# Patient Record
Sex: MALE | Race: WHITE | NOT HISPANIC OR LATINO | Employment: FULL TIME | URBAN - NONMETROPOLITAN AREA
[De-identification: names, ages, dates, MRNs, and addresses within clinical notes are randomized per-mention and may not be internally consistent; named-entity substitution may affect disease eponyms.]

---

## 2017-06-10 ENCOUNTER — HISTORY (OUTPATIENT)
Dept: EMERGENCY MEDICINE | Facility: OTHER | Age: 22
End: 2017-06-10

## 2017-06-24 ENCOUNTER — HISTORY (OUTPATIENT)
Dept: EMERGENCY MEDICINE | Facility: OTHER | Age: 22
End: 2017-06-24

## 2017-10-07 ENCOUNTER — OFFICE VISIT - GICH (OUTPATIENT)
Dept: FAMILY MEDICINE | Facility: OTHER | Age: 22
End: 2017-10-07

## 2017-10-07 ENCOUNTER — HISTORY (OUTPATIENT)
Dept: FAMILY MEDICINE | Facility: OTHER | Age: 22
End: 2017-10-07

## 2017-10-07 DIAGNOSIS — R31.9 HEMATURIA: ICD-10-CM

## 2017-10-07 DIAGNOSIS — R10.9 ABDOMINAL PAIN: ICD-10-CM

## 2017-10-07 LAB
BACTERIA URINE: ABNORMAL BACTERIA/HPF
BILIRUB UR QL: NEGATIVE
CLARITY, URINE: ABNORMAL CLARITY
COLOR UR: ABNORMAL COLOR
EPITHELIAL CELLS: ABNORMAL EPI/HPF
GLUCOSE URINE: NEGATIVE MG/DL
KETONES UR QL: NEGATIVE MG/DL
LEUKOCYTE ESTERASE URINE: NEGATIVE
NITRITE UR QL STRIP: NEGATIVE
OCCULT BLOOD,URINE - HISTORICAL: ABNORMAL
PH UR: 7.5 [PH]
PROTEIN QUALITATIVE,URINE - HISTORICAL: NEGATIVE MG/DL
RBC - HISTORICAL: ABNORMAL /HPF
SP GR UR STRIP: 1.02
UROBILINOGEN,QUALITATIVE - HISTORICAL: NORMAL EU/DL
WBC - HISTORICAL: ABNORMAL /HPF

## 2017-12-27 NOTE — PROGRESS NOTES
Patient Information     Patient Name MRN Sex Hi Davis [id number] Male 1995      Progress Notes by Gabriella Jacobo PA-C at 10/7/2017  4:45 PM     Author:  Gabriella Jacobo PA-C Service:  (none) Author Type:  PHYS- Physician Assistant     Filed:  10/7/2017  6:33 PM Encounter Date:  10/7/2017 Status:  Signed     :  Gabriella Jacobo PA-C (PHYS- Physician Assistant)            SUBJECTIVE:    Hi Kinney is a 22 y.o. male who presents for flank pain.    HPI Comments: Hi is here today for complaints of flank pain that started today.  It started on his right side, low back, then moved to the right side of the low abdomen and testicles ache intermittently.  The pain is colickly, throbs for a while, then resolved.  He has had many kidney stones in the past, at least 8-10, he states.  He has not seen a urologist but planned to in the future.  He has never sent a stone for composition.  He states this pain is not as bad as it has been in the past with prior kidney stones.    The pain is a 4-5 out of 10 now.   He took some ibuprofen and that helped. He has not had any noted hematuria or burning with urination. He is able to urinate normally.  He is not currently taking Flomax. This was prescribed to him in the past and he felt like it actually increased his pain so he would prefer not to take it.   He states he really has not been drinking much water in the last 2 weeks so he started drinking more when the pain came on today.    Patient Active Problem List    Diagnosis Code   (none) - all problems resolved or deleted    H/o recurrent renal lithiasis    Medications:  Ibuprofen    Allergies     Allergen  Reactions     Augmentin [Amoxicillin-Pot Clavulanate] *Unknown - Childhood Rxn     Cefzil [Cefprozil] *Unknown - Childhood Rxn       REVIEW OF SYSTEMS:  Review of Systems   Constitutional: Negative for chills, fever and malaise/fatigue.   Respiratory: Negative for cough and shortness of breath.   "  Cardiovascular: Negative for chest pain.   Gastrointestinal: Positive for diarrhea. Negative for constipation, nausea and vomiting.        One episode of loose stools this morning, resolved now.    Genitourinary: Positive for flank pain. Negative for dysuria, frequency, hematuria and urgency.   Musculoskeletal: Negative for myalgias.   Neurological: Negative for dizziness and headaches.     Social history:   Denies risk for STI (reviewed in detail).  Non smoker.     OBJECTIVE:  /84  Pulse 88  Temp 98.2  F (36.8  C) (Tympanic)  Ht 1.727 m (5' 8\")  Wt 107.2 kg (236 lb 6.4 oz)  BMI 35.94 kg/m2    EXAM:   Physical Exam   Constitutional: He is well-developed, well-nourished, and in no distress.   HENT:   Mouth/Throat: Oropharynx is clear and moist.   Neck: Neck supple.   Cardiovascular: Normal rate, regular rhythm and normal heart sounds.    Pulmonary/Chest: Effort normal and breath sounds normal. No respiratory distress.   Abdominal: Soft. Bowel sounds are normal. He exhibits no distension and no mass. There is no tenderness. There is no rebound and no guarding.   No CVA tenderness.   Genitourinary: Penis normal. No discharge found.   Genitourinary Comments: Testicles are descended and are smooth and nontender.    Lymphadenopathy:     He has no cervical adenopathy.   Skin: Skin is warm and dry.     Results for orders placed or performed in visit on 10/07/17      URINALYSIS W REFLEX MICROSCOPIC IF POSITIVE      Result  Value Ref Range    COLOR                     Becca (A) Yellow Color    CLARITY                   Slightly Cloudy (A) Clear Clarity    SPECIFIC GRAVITY,URINE    1.020 1.010, 1.015, 1.020, 1.025                    PH,URINE                  7.5 6.0, 7.0, 8.0, 5.5, 6.5, 7.5, 8.5                    UROBILINOGEN,QUALITATIVE  Normal Normal EU/dl    PROTEIN, URINE Negative Negative mg/dL    GLUCOSE, URINE Negative Negative mg/dL    KETONES,URINE             Negative Negative mg/dL    BILIRUBIN,URINE   "         Negative Negative                    OCCULT BLOOD,URINE        Large (A) Negative                    NITRITE                   Negative Negative                    LEUKOCYTE ESTERASE        Negative Negative                   URINALYSIS MICROSCOPIC      Result  Value Ref Range    RBC  (A) 0-2, None Seen /HPF    WBC None Seen 0-2, 3-5, None Seen /HPF    BACTERIA                  Few None Seen, Rare, Occasional, Few Bacteria/HPF    EPITHELIAL CELLS          None Seen None Seen, Few Epi/HPF     - Patient reports that he had a small stone pass into the urine cup with collection of this sample. He states it was about the size of a pin head and he felt immediate relief from his pain with this.   The lab was called to see if they could retrieve the stone but it was not found (possibly poured off with initial aliquot).        ASSESSMENT/PLAN:    ICD-10-CM    1. Flank pain R10.9 URINALYSIS W REFLEX MICROSCOPIC IF POSITIVE      URINALYSIS W REFLEX MICROSCOPIC IF POSITIVE      URINALYSIS MICROSCOPIC      URINALYSIS MICROSCOPIC   2. Hematuria, unspecified type R31.9         Plan: Flank pain and hematuria with probable kidney stone.  Patient was offered a referral to urology and he declines, citing a busy work schedule. If he has return of symptoms, he will make appointment with Dr. Peralta.  If he has severe symptoms, he will return for urgent re-evaluation.   He will be sure to increase oral fluids.    Gabriella Jacobo PA-C ....................  10/7/2017   6:29 PM

## 2017-12-29 NOTE — PATIENT INSTRUCTIONS
Patient Information     Patient Name MRN Hi Tovar [id number] Male 1995      Patient Instructions by Gabriella Jacobo PA-C at 10/7/2017  4:45 PM     Author:  Gabriella Jacobo PA-C Service:  (none) Author Type:  PHYS- Physician Assistant     Filed:  10/7/2017  6:13 PM Encounter Date:  10/7/2017 Status:  Signed     :  Gabriella Jacobo PA-C (PHYS- Physician Assistant)               Index Serbian Related topics   Kidney Stone   ________________________________________________________________________  KEY POINTS    A kidney stone is a solid piece of material that forms in your urinary system from substances in the urine. Stones may be small or large, and you may have 1 or many stones.    You may pass a kidney stone in your urine, or your healthcare provider will use shock waves to break the stones into pieces so they can pass, or you may have surgery to remove the stones.    Follow your healthcare provider's recommended treatment for any health problems that may be causing kidney stones.  ________________________________________________________________________  What is a kidney stone?  A kidney stone is a solid piece of material that forms in your urinary system. The stones are formed from substances in the urine. You can get a stone in any part of the urinary tract, including the kidneys, ureters, bladder, and urethra. Stones may be small or large. You may have just 1 stone or many stones.  The kidneys are inside your belly, on either side of your spine just above your waist. They make urine by taking waste products and extra salt and water from the blood. The ureters are tubes that carry urine from the kidneys to the bladder. The bladder holds your urine until you urinate, and the urethra is the tube that drains urine from the bladder.  Kidney stones are most common in young and middle-aged people. They are more common in men than in women. You may get them more than once.  What is the  cause?  There are different types of kidney stones.    Calcium stones: Most kidney stones are calcium stones. Many people have calcium stones from too much of a chemical called oxalate in their diet. Oxalate is in many foods, such as spinach, rhubarb, leafy vegetables, coffee, chocolate, and tomatoes. You may also get calcium stones when there is too much calcium in your urine. This may happen when your kidneys don't work properly or your stomach and intestines absorb too much calcium. The risk of having calcium stones is higher if you have certain medical conditions, such as an overactive parathyroid gland (a gland in the neck that controls calcium levels in the body) or inflammatory bowel disease, like Crohn s disease or ulcerative colitis.    Uric acid stones: Uric acid stones happen when you have too much uric acid in your urine. This might happen when your body does not have enough fluid--for example, when you are exercising on a hot day or during an illness and you don t drink enough fluids. Uric acid stones are common in people who have gout, a type of arthritis caused by too much uric acid in the blood.    Struvite stones: This type of stone is also called an infection stone because it forms in urine that is infected with bacteria.    Cystine stones. This type of kidney stone is rare. It happens if you have a genetic disease called cystinuria. This disease results from a birth defect that causes the kidney to let too much cystine into the urine. This type of stone is almost always diagnosed during childhood.  What are the symptoms?  Some kidney stones don t cause any symptoms. When they do, the symptoms may include:    Severe, crampy pain in your back or belly (the most common symptom)    Nausea and vomiting    Pink, red, or brown urine  Usually kidney stones cause pain off and on until the stone passes out of the body. The pain may move from the upper to the lower belly over a few hours. As the stone moves  lower, the pain may be felt in the genital area.  Sometimes kidney stones cause a blockage and a urinary tract infection. If you have an infection, you may have fever, chills, sweats, and pain when you urinate.   Some people don t have any symptoms until they pass the gravel-like stones in their urine. Others never have any symptoms, and their stones are found during testing for other problems.   How is it diagnosed?  Your healthcare provider will ask about your symptoms and medical history and examine you. You will have urine and blood tests. Other tests you may have are:    X-ray of your belly    Ultrasound scan, which passes sound waves and their echoes through your body from a small device that is held against your skin to create pictures of organs inside your belly or pelvis    CT scan, which is a series of X-rays taken from different angles and arranged by a computer to show thin cross sections of parts of the body  How is it treated?  Treatment depends on:    The size, type, and location of the stone(s)    If stones are blocking urine flow out of the kidney    If you have signs of infection  You may be able to treat yourself at home by drinking lots of liquids and taking pain medicine. Kidney stones that are 3/16 of an inch (0.5 centimeters) or less in diameter usually pass on their own. Your healthcare provider may ask you to strain all urine until the stone is passed. When the stone is caught, it can be tested in the lab to see what kind of stone it is. Your provider may prescribe medicine that will help you pass stones or help keep you from getting more stones.  A stone may take days or even weeks to pass. If you have pain from a stone and it has not passed after a month or so, your healthcare provider may need to remove it.  You may need to be in the hospital if:    Oral medicines are not relieving pain from the stones    You are vomiting too much to drink liquids    You have signs of infection in your  bloodstream, such as fever, chills, and a high white blood cell count    You need surgery to remove a stone that is not passing and is causing severe problems  Shock waves through the skin may be used to break up the stones into smaller pieces. The pieces of stone may then be able to pass out of your body when you urinate. This is a procedure called extra-corporeal (outside of the body) shock wave lithotripsy (ESWL).  If you have a stone in the bladder that needs to be removed with surgery, it may be broken up first with lithotripsy.  Two types of surgery may be done to treat kidney stones.    Ureteroscopy uses a thin, lighted flexible tube inserted into the urinary tract through the urethra. Tiny tools and laser fiber can be passed through the scope to break up and remove stones.    The approach through the skin requires a small cut in your back. A lighted tube, laser fiber, and tools used to break and remove the stones are inserted through the cut in your skin.  Sometimes a small tube called a stent may be placed in one of the ureters to help a stone pass. A stent may be used if:    A stone is large    There is a risk of blockage by stones    You have an infection caused by blockage  How can I take care of myself?  Follow the full course of treatment prescribed by your healthcare provider. In addition:    Make sure you drink enough liquids.    Take pain medicine as prescribed by your healthcare provider.  Ask your provider:    How and when you will get your test results    How long it will take to recover    If there are activities you should avoid and when you can return to your normal activities    How to take care of yourself at home    What symptoms or problems you should watch for and what to do if you have them  Make sure you know when you should come back for a checkup. Keep all appointments for provider visits or tests.  How can I help prevent kidney stones?    Follow your healthcare provider's recommended  treatment for any health problems that may be causing kidney stones.    Drink plenty of fluids every day.    Follow any changes in your diet recommended by your provider.    Take any medicines your provider may prescribe to help prevent more stones.  Developed by [employer].  Adult Advisor 2016.3 published by [employer].  Last modified: 2016-03-23  Last reviewed: 2015-08-10  This content is reviewed periodically and is subject to change as new health information becomes available. The information is intended to inform and educate and is not a replacement for medical evaluation, advice, diagnosis or treatment by a healthcare professional.  References   Adult Advisor 2016.3 Index    Copyright   2016 [employer], a division of [employer] All rights reserved.

## 2017-12-30 NOTE — NURSING NOTE
Patient Information     Patient Name MRN Hi Tovar [id number] Male 1995      Nursing Note by Franklin García at 10/7/2017  4:45 PM     Author:  Franklin García Service:  (none) Author Type:  (none)     Filed:  10/7/2017  5:30 PM Encounter Date:  10/7/2017 Status:  Signed     :  Franklin García            Patient presents with concerns of a bladder infection. He states that he first started having symptoms today around noon. Started with a pain in his right side and back and now the pain has moved to his bladder. Patient states that he has had kidney stones in the past, but denies any bladder infections. Patient states that he took 3 Advil around 4 pm today.     Franklin García ....................  10/7/2017   5:13 PM

## 2018-01-14 ENCOUNTER — OFFICE VISIT - GICH (OUTPATIENT)
Dept: FAMILY MEDICINE | Facility: OTHER | Age: 23
End: 2018-01-14

## 2018-01-14 ENCOUNTER — HISTORY (OUTPATIENT)
Dept: FAMILY MEDICINE | Facility: OTHER | Age: 23
End: 2018-01-14

## 2018-01-14 DIAGNOSIS — J01.80 OTHER ACUTE SINUSITIS: ICD-10-CM

## 2018-01-14 DIAGNOSIS — H65.01 ACUTE SEROUS OTITIS MEDIA OF RIGHT EAR: ICD-10-CM

## 2018-01-14 DIAGNOSIS — J40 BRONCHITIS: ICD-10-CM

## 2018-01-26 VITALS
HEART RATE: 88 BPM | DIASTOLIC BLOOD PRESSURE: 84 MMHG | WEIGHT: 236.4 LBS | SYSTOLIC BLOOD PRESSURE: 124 MMHG | BODY MASS INDEX: 35.83 KG/M2 | HEIGHT: 68 IN | TEMPERATURE: 98.2 F

## 2018-01-31 ENCOUNTER — DOCUMENTATION ONLY (OUTPATIENT)
Dept: FAMILY MEDICINE | Facility: OTHER | Age: 23
End: 2018-01-31

## 2018-02-09 VITALS
WEIGHT: 230.4 LBS | DIASTOLIC BLOOD PRESSURE: 84 MMHG | TEMPERATURE: 97.3 F | SYSTOLIC BLOOD PRESSURE: 150 MMHG | BODY MASS INDEX: 35.03 KG/M2

## 2018-02-12 NOTE — NURSING NOTE
Patient Information     Patient Name MRN Hi Tovar [id number] Male 1995      Nursing Note by Rosalinda Read at 2018  3:30 PM     Author:  Rosalinda Read Service:  (none) Author Type:  (none)     Filed:  2018  4:01 PM Encounter Date:  2018 Status:  Signed     :  Rosalinda Read            Patient states that he has had a cold for the last two weeks.  Ears have been hurting the last couple of days  Has taken Nyquil and Tylenol Cold and Flu  Rosalinda Read LPN 2018 3:42 PM

## 2018-02-12 NOTE — PROGRESS NOTES
Patient Information     Patient Name MRN Sex Hi Davis [id number] Male 1995      Progress Notes by Virgie Schwartz MD at 2018  3:30 PM     Author:  Virgie Schwartz MD Service:  (none) Author Type:  Physician     Filed:  2018  4:16 PM Encounter Date:  2018 Status:  Signed     :  Virgie Schwartz MD (Physician)            SUBJECTIVE:  Hi Kinney is a 22 y.o. male who complains of ear pain since this morning.  Has had congestion and URI for almost 3 weeks. Now can hardly taste or smell foods.   Had lots of otitis media in childhood and had tonsils out but none recently.  Cough worse at night. Has used some cold and flu over the counter medications and Nyquil as needed.            No current outpatient prescriptions on file.     No current facility-administered medications for this visit.      Medications have been reviewed by me and are current to the best of my knowledge and ability.    Allergies as of 2018 - Michael as Reviewed 2018      Allergen  Reaction Noted     Augmentin [amoxicillin-pot clavulanate] *Unknown - Childhood Rxn 2012     Cefzil [cefprozil] *Unknown - Childhood Rxn 2012       OBJECTIVE:  /84 (Cuff Site: Left Arm, Position: Sitting, Cuff Size: Adult Large)  Temp 97.3  F (36.3  C) (Tympanic)   Wt 104.5 kg (230 lb 6.4 oz)  BMI 35.03 kg/m2  General appearance: healthy, alert and cooperative.   Left Ear: normal; external ear canal and TM clear, nontender.  Right Ear: abnormal TM exam - serous fluid noted  Nose: clear rhinorrhea and mucosal edema and congestion  Oropharynx: normal pharynx and buccal mucosa. Dental hygeine adequate.  Neck: normal; supple with no masses or nodes.  Lungs:normal chest wall and respirations; clear to auscultation.      ASSESSMENT/PLAN:  1. Acute non-recurrent sinusitis of other sinus    2. Bronchitis    3. Right acute serous otitis media, recurrence not specified          Medications as per orders  and prescription for zithromax given due to allergy profile.   Symptomatic therapy suggested: use increased fluids and rest, acetaminophen, cough suppressant of choice and saline nasal spray OTC liberally prn.   Call or return to clinic prn if these symptoms worsen or fail to improve as anticipated.  Virgie Schwartz MD  4:16 PM 1/14/2018

## 2018-02-13 NOTE — PATIENT INSTRUCTIONS
Patient Information     Patient Name MRN Hi Tovar [id number] Male 1995      Patient Instructions by Virgie Schwartz MD at 2018  4:06 PM     Author:  Virgie Schwartz MD  Service:  (none) Author Type:  Physician     Filed:  2018  4:06 PM  Encounter Date:  2018 Status:  Addendum     :  Virgie Schwartz MD (Physician)        Related Notes: Original Note by Virgie Schwartz MD (Physician) filed at 2018  4:06 PM               Index Vietnamese Related topics   Sinusitis   ________________________________________________________________________  KEY POINTS    Sinusitis is swelling and irritation of the linings of the sinuses, the hollow spaces in the bones of your face and front of your skull.    You may need to take medicine for your stuffy nose, pain, infection, or swelling.    Use saline nasal sprays or rinses to help wash out nasal passages if you have a sinus infection. A humidifier can add moisture to the air also.  ________________________________________________________________________  What is sinusitis?  Sinusitis is swelling and irritation of the linings of the sinuses. The sinuses are hollow spaces in the bones of your face and front of your skull. They connect with the nose through small openings. Like the nose, they are lined with tissue (membranes) that make mucus. Mucus drains through the small openings to the nose.  What is the cause?  The passageways from the sinuses to the nose are very narrow. When drainage of mucus from the sinuses is blocked, the sinuses get swollen and irritated. They may also become infected with bacteria, a virus, or even fungus. Allergies or irritation from pollen, mold, dust, or smoke can also cause swelling of the sinuses. Sometimes a tooth infection spreads to the sinuses.  You may be more likely to get sinus congestion and infections if you have:    Severe or untreated seasonal or year-round allergies    Injured the bones  in your nose    A deformity of the nose that causes the sinuses not to drain properly    Small growths called polyps in the sinuses that partially block the sinus openings  What are the symptoms?  Symptoms may include:    Feeling of fullness or pressure in your face or head    A headache that is most painful when you first wake up in the morning or when you bend over and put your head down    Pain in your face    Aching in the upper jaw and teeth    Runny or stuffy nose    Cough, especially at night    Fluid draining down the back of your throat (postnasal drip)    Sore throat, especially in the morning or evening  How is it diagnosed?  Your healthcare provider will ask about your symptoms and medical history and examine you. Tests are often not needed but may include:    X-ray of your sinuses    CT scan, which uses X-rays and a computer to show detailed pictures of the sinuses  How is it treated?  Several kinds of medicine may help:    Nonprescription pain medicine, such as acetaminophen, ibuprofen, or naproxen. Read the label and take as directed. Unless recommended by your healthcare provider, you should not take these medicines for more than 10 days.    Nonsteroidal anti-inflammatory medicines (NSAIDs), such as ibuprofen, naproxen, and aspirin, may cause stomach bleeding and other problems. These risks increase with age.    Acetaminophen may cause liver damage or other problems. Unless recommended by your provider, don't take more than 3000 milligrams (mg) in 24 hours. To make sure you don t take too much, check other medicines you take to see if they also contain acetaminophen. Ask your provider if you need to avoid drinking alcohol while taking this medicine.    Decongestants pills or nasal sprays to reduce swelling in your nose and sinuses and lessen the amount of mucus. Use decongestants as directed. If you are using a nonprescription nasal-spray decongestant, generally you should not use it for more than 3  days. After 3 days it may make your symptoms worse. Ask your healthcare provider if it is OK for you to use a nasal spray decongestant longer than this.    Antihistamine tablets or a nasal spray to treat the allergies during your allergy season or, in some cases, year-round. Antihistamines block the effect of a chemical your body makes when you have an allergic reaction.    Antibiotics, if your provider thinks you might have a sinus infection    Steroid nasal spray if your provider thinks it may help clear your sinuses  If sinusitis is still a problem despite treatment, you may be referred to an allergy specialist or an ear, nose, and throat (ENT) specialist. The allergy specialist will check for and help treat your allergies to lessen the chance of having sinusitis. The ENT specialist will check for polyps or a deformed bone that may be blocking your sinuses. You may need surgery to create an extra or enlarged passageway to help your sinuses drain more easily.  Depending on what caused the sinusitis and how severe it is, it may last for days or weeks. For most cases of sinusitis, the symptoms get better gradually over 3 to 10 days.  How can I take care of myself?  Follow the full course of treatment prescribed by your healthcare provider. In addition:    If you are taking an antibiotic, take all of it as directed by your provider. If you stop taking the medicine when your symptoms are gone but before you have taken all of the medicine, symptoms may come back.    Don t smoke, and stay away from others who are smoking.    If you have allergies, try to avoid the things you are allergic to, like animal dander. Use medicine to keep your nose and sinuses open.    Use a humidifier to put more moisture in the air. This can help to open blocked sinuses and relieve pain. Avoid steam vaporizers because they can cause burns. Be sure to keep the humidifier clean, as recommended in the 's instructions. It's important  to keep bacteria and mold from growing in the water container.    Use saline nasal sprays or rinses to help wash out nasal passages if you have a sinus infection. You may use the sprays also to prevent infections.    Get plenty of rest.    Drink more fluids to keep the mucus as thin as possible so your sinuses can drain more easily.    Raise the head of your bed slightly or sleep on extra pillows to help your sinuses drain.    Put warm, moist cloths on painful areas.  Ask your healthcare provider:    How and when you will get your test results    How long it will take to recover    If there are activities you should avoid and when you can return to your normal activities    How to take care of yourself at home    What symptoms or problems you should watch for and what to do if you have them  Make sure you know when you should come back for a checkup. Keep all appointments for provider visits or tests.  How can I help prevent sinusitis?    Treat your colds and allergies promptly. Use decongestants as soon as you start having symptoms, and before you fly, travel to high altitudes, or swim in deep water.    If you smoke, try to quit. Talk to your healthcare provider about ways to quit smoking.  Developed by [employer].  Adult Advisor 2017.2 published by [employer].  Last modified: 2016-03-23  Last reviewed: 2015-08-27  This content is reviewed periodically and is subject to change as new health information becomes available. The information is intended to inform and educate and is not a replacement for medical evaluation, advice, diagnosis or treatment by a healthcare professional.  References   Adult Advisor 2017.2 Index    Copyright   2017 [employer], a division of [employer] All rights reserved.

## 2018-06-06 ENCOUNTER — OFFICE VISIT (OUTPATIENT)
Dept: FAMILY MEDICINE | Facility: OTHER | Age: 23
End: 2018-06-06
Attending: PHYSICIAN ASSISTANT
Payer: COMMERCIAL

## 2018-06-06 VITALS
SYSTOLIC BLOOD PRESSURE: 130 MMHG | HEART RATE: 80 BPM | WEIGHT: 236.2 LBS | DIASTOLIC BLOOD PRESSURE: 86 MMHG | BODY MASS INDEX: 35.8 KG/M2 | TEMPERATURE: 97.7 F | HEIGHT: 68 IN | OXYGEN SATURATION: 99 %

## 2018-06-06 DIAGNOSIS — R10.9 FLANK PAIN: Primary | ICD-10-CM

## 2018-06-06 DIAGNOSIS — R31.29 MICROSCOPIC HEMATURIA: ICD-10-CM

## 2018-06-06 LAB
ALBUMIN UR-MCNC: NEGATIVE MG/DL
APPEARANCE UR: CLEAR
BILIRUB UR QL STRIP: NEGATIVE
C TRACH DNA SPEC QL PROBE+SIG AMP: NOT DETECTED
COLOR UR AUTO: YELLOW
GLUCOSE UR STRIP-MCNC: NEGATIVE MG/DL
HGB UR QL STRIP: ABNORMAL
KETONES UR STRIP-MCNC: NEGATIVE MG/DL
LEUKOCYTE ESTERASE UR QL STRIP: NEGATIVE
N GONORRHOEA DNA SPEC QL PROBE+SIG AMP: NOT DETECTED
NITRATE UR QL: NEGATIVE
PH UR STRIP: 5.5 PH (ref 5–7)
RBC #/AREA URNS AUTO: NORMAL /HPF
SOURCE: ABNORMAL
SP GR UR STRIP: <1.005 (ref 1–1.03)
SPECIMEN SOURCE: NORMAL
UROBILINOGEN UR STRIP-ACNC: 0.2 EU/DL (ref 0.2–1)
WBC #/AREA URNS AUTO: NORMAL /HPF

## 2018-06-06 PROCEDURE — 87591 N.GONORRHOEAE DNA AMP PROB: CPT | Performed by: PHYSICIAN ASSISTANT

## 2018-06-06 PROCEDURE — 81001 URINALYSIS AUTO W/SCOPE: CPT | Performed by: PHYSICIAN ASSISTANT

## 2018-06-06 PROCEDURE — 87491 CHLMYD TRACH DNA AMP PROBE: CPT | Performed by: PHYSICIAN ASSISTANT

## 2018-06-06 PROCEDURE — 99213 OFFICE O/P EST LOW 20 MIN: CPT | Performed by: PHYSICIAN ASSISTANT

## 2018-06-06 RX ORDER — IBUPROFEN 800 MG/1
800 TABLET, FILM COATED ORAL EVERY 8 HOURS PRN
Qty: 30 TABLET | Refills: 0 | Status: SHIPPED | OUTPATIENT
Start: 2018-06-06 | End: 2019-11-02

## 2018-06-06 NOTE — PROGRESS NOTES
"SUBJECTIVE: Hi Kinney is a 22 year old male who complains of flank pain on right. Onset at bedtime last night. Quality: pressure. Severity: 3/10. Waxes and wanes. Course is improved from last night. Aggravated by: nothing. Alleviated by: ibuprofen 400 mg at 8 AM today.   Associated symptoms: no burning with urination, frequency, hematuria, normal stool. Urine is clear. No fever, abdominal pain, nausea and vomiting    History of frequent stones. Last stone was less than 1 year ago. No prior UTI. He in the past has had every 2 months. He alleviated this by drinking more fluids, over the past month he hasn't been drinking fluids as well recently and thinks maybe he has another stone.     Is not sexually active. Does not suspect STI    History reviewed. No pertinent past medical history.  No current outpatient prescriptions on file.     No current facility-administered medications for this visit.         Allergies   Allergen Reactions     Amoxicillin-Pot Clavulanate      Other reaction(s): *Unknown - Childhood Rxn     Cefprozil      Other reaction(s): *Unknown - Childhood Rxn         OBJECTIVE:   Vitals:    06/06/18 1046   BP: 130/86   Pulse: 80   Temp: 97.7  F (36.5  C)   SpO2: 99%   Weight: 236 lb 3.2 oz (107.1 kg)   Height: 5' 8\" (1.727 m)     General: alert and oriented, NAD  Cardiac: normal RR, no murmur  Respiratory: normal respiration, clear to ausculation  Abdomen: soft, non tender, normal bowel sounds, no CVAT    Results for orders placed or performed in visit on 06/06/18 (from the past 24 hour(s))   *UA reflex to Microscopic   Result Value Ref Range    Color Urine Yellow     Appearance Urine Clear     Glucose Urine Negative NEG^Negative mg/dL    Bilirubin Urine Negative NEG^Negative    Ketones Urine Negative NEG^Negative mg/dL    Specific Gravity Urine <1.005 1.003 - 1.035    Blood Urine Trace (A) NEG^Negative    pH Urine 5.5 5.0 - 7.0 pH    Protein Albumin Urine Negative NEG^Negative mg/dL    " Urobilinogen Urine 0.2 0.2 - 1.0 EU/dL    Nitrite Urine Negative NEG^Negative    Leukocyte Esterase Urine Negative NEG^Negative    Source Unspecified Urine    Urine Microscopic   Result Value Ref Range    WBC Urine 0 - 5 OTO5^0 - 5 /HPF    RBC Urine O - 2 OTO2^O - 2 /HPF         ASSESSMENT:     (R10.9) Flank pain  (R31.29) Microscopic hematuria  (primary encounter diagnosis)    Plan: UA reflex to Microscopic, GC/Chlamydia by PCR         - HI,GH, Urine Microscopicibuprofen (ADVIL/MOTRIN) 800 MG tablet    Microscopic hematuria with right flank pain  Urinalysis negative for infection - did show microscopic blood  Suspect stone has passed or will pass with history  Drink 2-3 liters of fluid per day  Ibuprofen 800 mg every 6-8 hours, max 2400 mg/day. Take with food.   Tylenol 1000 gm every 4-6 hours, max 4000 mg/day  Strain urine  Follow up with PCP as needed  Declines referral to urology, Zofran and Flomax today  Patient received verbal and written instruction including review of warning signs    NOAH Phelan on 6/6/2018 at 3:18 PM

## 2018-06-06 NOTE — NURSING NOTE
Patient present to clinic today with a possible bladder infection. Blood in urine, right lower back pain. History of kidney stones.   Cathy Bustillo CMA..............6/6/2018........10:45 AM

## 2018-06-06 NOTE — PATIENT INSTRUCTIONS
"Microscopic hematuria with right flank pain  Urinalysis negative for infection - did show microscopic blood  Suspect stone has passed or will pass with history  Drink 2-3 liters of fluid per day  Ibuprofen 800 mg every 6-8 hours, max 2400 mg/day. Take with food.   Tylenol 1000 gm every 4-6 hours, max 4000 mg/day  Strain urine  Follow up with PCP as needed  Declines referral to urology, Zofran and Flomax today  Seek immediate care    Pain that is not controlled by the medicine given    Repeated vomiting or unable to keep down fluids    Weakness, dizziness or fainting    Fever over 101  F (38.3  C)    Passage of solid red or brown urine (can't see through it) or urine with lots of blood clots    Unable to pass urine for 8 hours and increasing bladder pressure     * KIDNEY STONE (w/ Colic)    The sharp cramping pain and nausea/vomiting that you have is due to a small stone which has formed in the kidney and is now passing down a narrow tube (ureter) on its way to your bladder. Once it reaches your bladder, the pain will stop. The stone may pass in your urine stream in one piece. [The size may be 1/16\" to 1/4\" (1-6mm)]. Or, the stone may also break up into amandeep fragments which you may not even notice.  Once you have had a kidney stone there is a risk for recurrence in the future.  HOME CARE:      Drink lots of fluid (at least 8-10 glasses of water a day).    Most stones will pass on their own, but may take from a few hours to a few days.    Each time you urinate, do so in a jar. Pour the urine from the jar through the strainer and into the toilet. Continue doing this until 24 hours after your pain stops. By then, if there was a kidney stone, it should pass from your bladder. Some stones dissolve into sand-like particles and pass right through the strainer. In that case, you won't ever see a stone.    Save any stone that you find in the strainer and bring it to your doctor for analysis. It may be possible to prevent " certain types of stones from forming. Therefore, it is important to know what kind of stone you have.    Try to stay as active as possible since this will help the stone pass. Do not stay in bed unless your pain prevents you from getting up. You may notice a red, pink or brown color to your urine. This is normal while passing a kidney stone.  FOLLOW UP with your doctor or return to this facility if the pain lasts more than 48 hours.  GET PROMPT MEDICAL ATTENTION if any of the following occur:    Pain that is not controlled by the medicine given    Repeated vomiting or unable to keep down fluids    Weakness, dizziness or fainting    Fever over 101  F (38.3  C)    Passage of solid red or brown urine (can't see through it) or urine with lots of blood clots    Unable to pass urine for 8 hours and increasing bladder pressure    4429-1580 [employer]. [address] [place] [address], [place] [address]. All rights reserved. This information is not intended as a substitute for professional medical care. Always follow your healthcare professional's instructions.  This information has been modified by your health care provider with permission from the publisher.

## 2018-06-28 ENCOUNTER — OFFICE VISIT (OUTPATIENT)
Dept: FAMILY MEDICINE | Facility: OTHER | Age: 23
End: 2018-06-28
Attending: NURSE PRACTITIONER
Payer: COMMERCIAL

## 2018-06-28 VITALS
WEIGHT: 234.13 LBS | DIASTOLIC BLOOD PRESSURE: 84 MMHG | HEART RATE: 68 BPM | TEMPERATURE: 98.4 F | BODY MASS INDEX: 35.48 KG/M2 | HEIGHT: 68 IN | SYSTOLIC BLOOD PRESSURE: 140 MMHG

## 2018-06-28 DIAGNOSIS — B35.3 TINEA PEDIS OF BOTH FEET: Primary | ICD-10-CM

## 2018-06-28 PROCEDURE — 99213 OFFICE O/P EST LOW 20 MIN: CPT | Performed by: NURSE PRACTITIONER

## 2018-06-28 RX ORDER — NYSTATIN AND TRIAMCINOLONE ACETONIDE 100000; 1 [USP'U]/G; MG/G
CREAM TOPICAL 2 TIMES DAILY
Qty: 60 G | Refills: 0 | Status: SHIPPED | OUTPATIENT
Start: 2018-06-28 | End: 2018-07-02

## 2018-06-28 NOTE — PATIENT INSTRUCTIONS
Athlete s Foot  Athlete s foot (tinea pedis) is caused by a fungal infection in the skin. It affects the skin between the toes, causing cracks in the skin called fissures. It can also affect the bottom of the foot where it causes dry white scales and peeling of the skin. This infection is more likely to occur when the foot is in hot, sweaty socks and shoes for long periods of time. You may feel itching and burning between your toes. This infection is treated with skin creams or medicine taken by mouth.  Home care  The following are general care guidelines:    It is important to keep the feet dry. Use absorbent cotton socks and change them if they become sweaty. Or wear an open-toe shoe or sandal. Wash the feet at least once a day with soap and water.    Apply the antifungal cream as prescribed. Some antifungal creams are available without a prescription.    It may take a week before the rash starts to improve. It can take about 3 to 4 weeks to completely clear. Continue the medicine until the rash is all gone.    Use over-the-counter antifungal powders or sprays on your feet after exposure to high-risk environments, such as public showers, gyms, and locker rooms. This can help prevent future infections. Wearing appropriate shoes in these situations can help.  Prevention  The following tips may help prevent athlete s foot:    Don't share shoes or socks with someone who has athlete's foot.    Don't walk barefoot in places where a fungal infection can spread quickly such as locker rooms, showers, and swimming pools.    Change socks regularly.    Alternate shoes to assist in drying.  Follow-up care  Follow up with your healthcare provider as recommended if the rash does not improve after 10 days of treatment, or if the rash continues to spread.  When to seek medical care  Get medical attention right away if any of the following occur:    Fever of 100.4 F (38 C) or higher, or as directed    Increasing redness or  swelling of the foot    Infection comes back soon after treatment    Pus draining from cracks in the skin

## 2018-06-28 NOTE — PROGRESS NOTES
"Nursing Notes:   Soraya Chakraborty CMA  6/28/2018  6:28 PM  Signed  Patient presents to the clinic for foot check. Patient states he has athlete's foot on both feet that started a couple weeks ago and has tried gold bond ointment and an otc liquid athlete's foot medication with some relief.   Soraya ALEXIS CMA.......6/28/2018..6:02 PM      SUBJECTIVE:   Hi Kinney is a 23 year old male who presents to clinic today for the following health issues:    Rash      Duration: 2-3 weeks    Description  Location: Bottom of feet  Itching: mild    Intensity:  Moderate, can be burning at times.     Accompanying signs and symptoms: Burning, pain, irritation.     History (similar episodes/previous evaluation): None    Precipitating or alleviating factors:  New exposures:  None  Recent travel: no      Therapies tried and outcome: OTC athlete's foot creams, lotions, sprays.   The rash gets better only to come back.       Problem list and histories reviewed & adjusted, as indicated.  Additional history: as documented    Current Outpatient Prescriptions   Medication Sig Dispense Refill     ibuprofen (ADVIL/MOTRIN) 800 MG tablet Take 1 tablet (800 mg) by mouth every 8 hours as needed for moderate pain 30 tablet 0     nystatin-triamcinolone (MYCOLOG II) cream Apply topically 2 times daily for 14 days 60 g 0     Allergies   Allergen Reactions     Amoxicillin-Pot Clavulanate      Other reaction(s): *Unknown - Childhood Rxn     Cefprozil      Other reaction(s): *Unknown - Childhood Rxn         ROS:  Notable findings in the HPI.       OBJECTIVE:     /84 (BP Location: Left arm, Patient Position: Sitting, Cuff Size: Adult Large)  Pulse 68  Temp 98.4  F (36.9  C) (Tympanic)  Ht 5' 8\" (1.727 m)  Wt 234 lb 2 oz (106.2 kg)  BMI 35.6 kg/m2  Body mass index is 35.6 kg/(m^2).  GENERAL: healthy, alert and no distress  EYES: Eyes grossly normal to inspection  SKIN: Examination of the rash reveals: redness to the bottom of the feet, on the " ball of the feet there are white patches of skin, mild fissures/craks noted.     Diagnostic Test Results:  none     ASSESSMENT/PLAN:     1. Tinea pedis of both feet  - nystatin-triamcinolone (MYCOLOG II) cream; Apply topically 2 times daily for 14 days  Dispense: 60 g; Refill: 0    Medical Decision Making:    Differential Diagnosis:  Rash: Benign, reassuring rash  Dermatitis  Non-specific rash  Tinea pedis    Serious Comorbid Conditions:  Adult:  None    PLAN:    Rash:  moisturizer  Reassurance was given to the patient  Rx: Mycolog     Followup:    If not improving or if condition worsens, follow up with your Primary Care Provider    Disclaimer:  This note consists of words and symbols derived from keyboarding, dictation, or using voice recognition software. As a result, there may be errors in the script that have gone undetected. Please consider this when interpreting information found in this note.      Rosibel Hernandez NP, 6/28/2018 7:28 PM

## 2018-06-28 NOTE — NURSING NOTE
Patient presents to the clinic for foot check. Patient states he has athlete's foot on both feet that started a couple weeks ago and has tried gold bond ointment and an otc liquid athlete's foot medication with some relief.   Soraya ALEXIS CMA.......6/28/2018..6:02 PM

## 2018-06-28 NOTE — MR AVS SNAPSHOT
After Visit Summary   6/28/2018    Hi Kinney    MRN: [id number]           Patient Information     Date Of Birth          1995        Visit Information        Provider Department      6/28/2018 6:00 PM Rosibel Hernandez NP [facility]        Today's Diagnoses     Tinea pedis of both feet    -  1      Care Instructions      Athlete s Foot  Athlete s foot (tinea pedis) is caused by a fungal infection in the skin. It affects the skin between the toes, causing cracks in the skin called fissures. It can also affect the bottom of the foot where it causes dry white scales and peeling of the skin. This infection is more likely to occur when the foot is in hot, sweaty socks and shoes for long periods of time. You may feel itching and burning between your toes. This infection is treated with skin creams or medicine taken by mouth.  Home care  The following are general care guidelines:    It is important to keep the feet dry. Use absorbent cotton socks and change them if they become sweaty. Or wear an open-toe shoe or sandal. Wash the feet at least once a day with soap and water.    Apply the antifungal cream as prescribed. Some antifungal creams are available without a prescription.    It may take a week before the rash starts to improve. It can take about 3 to 4 weeks to completely clear. Continue the medicine until the rash is all gone.    Use over-the-counter antifungal powders or sprays on your feet after exposure to high-risk environments, such as public showers, gyms, and locker rooms. This can help prevent future infections. Wearing appropriate shoes in these situations can help.  Prevention  The following tips may help prevent athlete s foot:    Don't share shoes or socks with someone who has athlete's foot.    Don't walk barefoot in places where a fungal infection can spread quickly such as locker rooms, showers, and swimming pools.    Change socks regularly.    Alternate  "shoes to assist in drying.  Follow-up care  Follow up with your healthcare provider as recommended if the rash does not improve after 10 days of treatment, or if the rash continues to spread.  When to seek medical care  Get medical attention right away if any of the following occur:    Fever of 100.4 F (38 C) or higher, or as directed    Increasing redness or swelling of the foot    Infection comes back soon after treatment    Pus draining from cracks in the skin                  Follow-ups after your visit        Who to contact     If you have questions or need follow up information about today's clinic visit or your schedule please contact [facility] directly at [phone].  Normal or non-critical lab and imaging results will be communicated to you by MyChart, letter or phone within 4 business days after the clinic has received the results. If you do not hear from us within 7 days, please contact the clinic through MyChart or phone. If you have a critical or abnormal lab result, we will notify you by phone as soon as possible.  Submit refill requests through [employer] or call your pharmacy and they will forward the refill request to us. Please allow 3 business days for your refill to be completed.          Additional Information About Your Visit        Care EveryWhere ID     This is your Care EveryWhere ID. This could be used by other organizations to access your [place] medical records  [id number]        Your Vitals Were     Pulse Temperature Height BMI (Body Mass Index)          68 98.4  F (36.9  C) (Tympanic) 5' 8\" (1.727 m) 35.6 kg/m2         Blood Pressure from Last 3 Encounters:   06/28/18 140/84   06/06/18 130/86   01/14/18 150/84    Weight from Last 3 Encounters:   06/28/18 234 lb 2 oz (106.2 kg)   06/06/18 236 lb 3.2 oz (107.1 kg)   01/14/18 230 lb 6.4 oz (104.5 kg)              Today, you had the following     No orders found for display         Today's Medication Changes        "   These changes are accurate as of 6/28/18  6:29 PM.  If you have any questions, ask your nurse or doctor.               Start taking these medicines.        Dose/Directions    nystatin-triamcinolone cream   Commonly known as:  MYCOLOG II   Used for:  Tinea pedis of both feet   Started by:  Rosibel Hernandez NP        Apply topically 2 times daily for 14 days   Quantity:  60 g   Refills:  0            Where to get your medicines      These medications were sent to [id number] - [place], [place] - [address]  [address], [facility] [address]     Phone:  [phone]     nystatin-triamcinolone cream                Primary Care Provider Office Phone # Fax #    Giuseppe Harvey [phone]       [address]  [facility] [address]        Equal Access to Services     [facility]: Had[employer] bg merchant hadasho Soomaali, waaxda luqadaha, qaybta kaalmada adeegyada, keely villanueva . So [facility] [phone].    ATENCIÓN: Si habla español, tiene a encarnacion disposición servicios gratuitos de asistencia lingüística. Llame al [phone].    We comply with applicable federal civil rights laws and [place] laws. We do not discriminate on the basis of race, color, national origin, age, disability, sex, sexual orientation, or gender identity.            Thank you!     Thank you for choosing [facility]  for your care. Our goal is always to provide you with excellent care. Hearing back from our patients is one way we can continue to improve our services. Please take a few minutes to complete the written survey that you may receive in the mail after your visit with us. Thank you!             Your Updated Medication List - Protect others around you: Learn how to safely use, store and throw away your medicines at www.disposemymeds.org.          This list is accurate as of 6/28/18  6:29 PM.  Always use your most recent med list.                    Brand Name Dispense Instructions for use Diagnosis    ibuprofen 800 MG tablet    ADVIL/MOTRIN    30 tablet    Take 1 tablet (800 mg) by mouth every 8 hours as needed for moderate pain    Microscopic hematuria, Flank pain       nystatin-triamcinolone cream    MYCOLOG II    60 g    Apply topically 2 times daily for 14 days    Tinea pedis of both feet

## 2018-07-01 ENCOUNTER — TELEPHONE (OUTPATIENT)
Dept: FAMILY MEDICINE | Facility: OTHER | Age: 23
End: 2018-07-01

## 2018-07-01 NOTE — TELEPHONE ENCOUNTER
Patient called stating pharmacy told him he needs a prior authorization for cream provider prescribed 06/28. Please advise.    Shu Solis on 7/1/2018 at 11:33 AM

## 2018-07-02 DIAGNOSIS — B35.3 TINEA PEDIS OF BOTH FEET: ICD-10-CM

## 2018-07-02 RX ORDER — NYSTATIN 100000 U/G
CREAM TOPICAL 3 TIMES DAILY
Qty: 60 G | Refills: 0 | Status: SHIPPED | OUTPATIENT
Start: 2018-07-02 | End: 2018-07-16

## 2018-07-02 RX ORDER — TRIAMCINOLONE ACETONIDE 1 MG/G
OINTMENT TOPICAL
Qty: 30 G | Refills: 0 | Status: SHIPPED | OUTPATIENT
Start: 2018-07-02 | End: 2019-03-10

## 2018-07-02 NOTE — TELEPHONE ENCOUNTER
Called and spoke with patient after proper verification. Informed patient of below message. Patient stated understanding and no further questions at this time.   Christine Clemons LPN............. July 2, 2018 11:05 AM

## 2018-10-24 ENCOUNTER — HOSPITAL ENCOUNTER (EMERGENCY)
Facility: OTHER | Age: 23
Discharge: HOME | End: 2018-10-24
Attending: STUDENT IN AN ORGANIZED HEALTH CARE EDUCATION/TRAINING PROGRAM | Admitting: STUDENT IN AN ORGANIZED HEALTH CARE EDUCATION/TRAINING PROGRAM
Payer: COMMERCIAL

## 2018-10-24 VITALS
HEIGHT: 68 IN | DIASTOLIC BLOOD PRESSURE: 80 MMHG | TEMPERATURE: 99.2 F | RESPIRATION RATE: 16 BRPM | WEIGHT: 214 LBS | OXYGEN SATURATION: 98 % | BODY MASS INDEX: 32.43 KG/M2 | SYSTOLIC BLOOD PRESSURE: 151 MMHG

## 2018-10-24 DIAGNOSIS — Z87.442 HISTORY OF KIDNEY STONES: ICD-10-CM

## 2018-10-24 DIAGNOSIS — Z87.898 H/O LEFT FLANK PAIN: ICD-10-CM

## 2018-10-24 DIAGNOSIS — R31.21 ASYMPTOMATIC MICROSCOPIC HEMATURIA: Primary | ICD-10-CM

## 2018-10-24 LAB
ALBUMIN UR-MCNC: NEGATIVE MG/DL
APPEARANCE UR: CLEAR
BACTERIA #/AREA URNS HPF: ABNORMAL /HPF
BILIRUB UR QL STRIP: NEGATIVE
COLOR UR AUTO: YELLOW
GLUCOSE UR STRIP-MCNC: NEGATIVE MG/DL
HGB UR QL STRIP: ABNORMAL
KETONES UR STRIP-MCNC: NEGATIVE MG/DL
LEUKOCYTE ESTERASE UR QL STRIP: ABNORMAL
NITRATE UR QL: NEGATIVE
NON-SQ EPI CELLS #/AREA URNS LPF: ABNORMAL /LPF
PH UR STRIP: 6.5 PH (ref 5–9)
RBC #/AREA URNS AUTO: ABNORMAL /HPF
SOURCE: ABNORMAL
SP GR UR STRIP: 1.01 (ref 1–1.03)
UROBILINOGEN UR STRIP-ACNC: 0.2 EU/DL (ref 0.2–1)
WBC #/AREA URNS AUTO: ABNORMAL /HPF

## 2018-10-24 PROCEDURE — 99283 EMERGENCY DEPT VISIT LOW MDM: CPT | Performed by: STUDENT IN AN ORGANIZED HEALTH CARE EDUCATION/TRAINING PROGRAM

## 2018-10-24 PROCEDURE — 99283 EMERGENCY DEPT VISIT LOW MDM: CPT | Mod: Z6 | Performed by: STUDENT IN AN ORGANIZED HEALTH CARE EDUCATION/TRAINING PROGRAM

## 2018-10-24 PROCEDURE — 81001 URINALYSIS AUTO W/SCOPE: CPT | Performed by: EMERGENCY MEDICINE

## 2018-10-24 ASSESSMENT — ENCOUNTER SYMPTOMS
FREQUENCY: 0
HEMATURIA: 0
DYSURIA: 0
FEVER: 0
FLANK PAIN: 0
CHILLS: 0
VOMITING: 0
ABDOMINAL PAIN: 0
NAUSEA: 0

## 2018-10-24 NOTE — ED AVS SNAPSHOT
[facility]    [address]    [place] [address]    Phone:  [phone]    Fax:  [phone]                                       Hi Kinney   MRN: [id number]    Department:  [facility]   Date of Visit:  10/24/2018           After Visit Summary Signature Page     I have received my discharge instructions, and my questions have been answered. I have discussed any challenges I see with this plan with the nurse or doctor.    ..........................................................................................................................................  Patient/Patient Representative Signature      ..........................................................................................................................................  Patient Representative Print Name and Relationship to Patient    ..................................................               ................................................  Date                                   Time    ..........................................................................................................................................  Reviewed by Signature/Title    ...................................................              ..............................................  Date                                               Time          22EPIC Rev 08/18

## 2018-10-24 NOTE — LETTER
October 24, 2018      To Whom It May Concern:      Hi Kinney was seen in our Emergency Department today, 10/24/18.  I expect his condition to improve over the next 1-2 days.  He may return to work when improved.    Sincerely,        Yaw Gardner MD

## 2018-10-24 NOTE — ED AVS SNAPSHOT
[facility]    [address]    [place] [address]    Phone:  [phone]    Fax:  [phone]                                       Hi Kinney   MRN: [id number]    Department:  [facility]   Date of Visit:  10/24/2018           Patient Information     Date Of Birth          1995        Your diagnoses for this visit were:     H/O left flank pain     Asymptomatic microscopic hematuria     History of kidney stones        You were seen by Yaw Gardner MD.      Follow-up Information     Follow up with Giuseppe Harvey MD. Call in 1 week.    Specialty:  Family Practice    Contact information:    [address] [address] [phone]          Follow up with [facility].    Specialty:  EMERGENCY MEDICINE    Why:  If symptoms worsen    Contact information:    [address]  [facility] [address] [phone]        Discharge Instructions         Preventing Kidney Stones  If you ve had a kidney stone, you may worry that you ll have another. Removing or passing your stone doesn t prevent future stones. But with your healthcare provider s help, you can reduce your risk of forming new stones. Follow up with your healthcare provider to help find new stones. You may need follow-up every 3 months to a year for a lifetime.    Drink lots of water  Staying well-hydrated is the best way to reduce your risk of future stones. Drink 8 12-ounce glasses of water daily. Have 2 with each meal and 2 between meals. Try keeping a pitcher of water nearby during the day and at night.  Take medicines if needed  Medicines, including vitamins and minerals, may be prescribed for certain types of stones. You may want to write your doses and medicine times on a calendar. Some medicines decrease stone-forming chemicals in your blood. Others help prevent those chemicals from crystallizing in urine. Still others help keep  a normal acid balance in your urine.  Follow your prescribed diet  Your healthcare provider will tell you which foods contain the chemicals you should avoid. Your healthcare provider may also suggest talking to a dietitian. He or she can help you plan meals you ll enjoy. These meals won t put you at risk for future stones. You may be told to limit certain foods, depending on which type of stones you ve had. You should limit the amount of salt in your food to about 2 grams a day. This will help prevent most types of kidney stones. Make sure you get an adequate amount of calcium in your diet.  For calcium oxalate stones: Limit animal protein, such as meat, eggs, and fish. Limit grapefruit juice and alcohol. Limit high-oxalate foods (such as cola, tea, chocolate, spinach, rhubarb, wheat bran, and peanuts).  For uric acid stones: Limit high-purine foods, such as mushrooms, peas, beans, anchovies, meat, poultry, shellfish, and organ meats. These foods increase uric acid production.  For cystine stones: Limit high-methionine foods (fish is the most common, but eggs and meats, also). These foods increase production of cystine.  Date Last Reviewed: 2/1/2017 2000-2017 [employer]. [address] [place] [address], [place] [address]. All rights reserved. This information is not intended as a substitute for professional medical care. Always follow your healthcare professional's instructions.          Hematuria: Possible Causes     Many things can lead to blood in the urine (hematuria). The blood may be seen with the eye (macroscopic or gross hematuria). Or it may only be seen when the urine is looked at under a microscope (microscopic hematuria). Some of the most common causes of blood in the urine are listed below. Often, no cause for the blood can be found. This is called idiopathic hematuria.    Kidney or bladder stones are collections of crystals. They form in the urine. Stones may be found anywhere in the urinary  tract. But they form most often in the kidneys or bladder. In addition to blood in the urine, they can cause severe pain.    BPH stands for benign prostatic hyperplasia. It is enlargement of the prostate gland. It happens as men age. BPH often causes problems with urination. It sometimes causes blood in the urine.    A urinary tract infection (UTI) is due to bacteria growing in the urinary tract. It can cause blood in the urine. Other symptoms include burning or pain with urination. You may need to urinate often or urgently. You may also have a fever.    Damage to the urinary tract may cause blood in the urine. This damage may be due to a blow or accident. It may also result from the use of a urinary catheter. Very hard exercise may sometimes irritate the urinary tract and cause bleeding.    Cancer may occur anywhere in the urinary tract. A tumor may sometimes cause no symptoms other than bleeding.     Other possible causes of bleeding include:    Prostatitis (infection of the prostate gland)    Taking anticoagulants    Blockage in the urinary tract    Disease or inflammation of the kidney    Cystic diseases of the kidneys    Sickle cell anemia    Vigorous exercise    Endometriosis  Date Last Reviewed: 12/1/2016 2000-2017 [employer]. [address] [place] [address]. All rights reserved. This information is not intended as a substitute for professional medical care. Always follow your healthcare professional's instructions.          24 Hour Appointment Hotline     To schedule an appointment at [facility], please call [phone]. If you don't have a family doctor or clinic, we will help you find one. [facility] are conveniently located to serve the needs of you and your family.           Review of your medicines      Our records show that you are taking the medicines listed below. If these are incorrect, please call your family doctor or clinic.        Dose / Directions Last dose  "taken    ibuprofen 800 MG tablet   Commonly known as:  ADVIL/MOTRIN   Dose:  800 mg   Quantity:  30 tablet        Take 1 tablet (800 mg) by mouth every 8 hours as needed for moderate pain   Refills:  0        triamcinolone 0.1 % ointment   Commonly known as:  KENALOG   Quantity:  30 g        Apply sparingly to affected area three times daily for 14 days. Mix with nystatin cream, enough to cover   Refills:  0                Procedures and tests performed during your visit     *UA reflex to Microscopic    Urine Microscopic      Orders Needing Specimen Collection     None      Pending Results     No orders found from 10/22/2018 to 10/25/2018.            Pending Culture Results     No orders found from 10/22/2018 to 10/25/2018.            Pending Results Instructions     If you had any lab results that were not finalized at the time of your Discharge, you can call the ED Lab Result RN at [phone]. You will be contacted by this team for any positive Lab results or changes in treatment. The nurses are available 7 days a week from 10A to 6:30P.  You can leave a message 24 hours per day and they will return your call.        Thank you for choosing [facility]       Thank you for choosing [facility] for your care. Our goal is always to provide you with excellent care. Hearing back from our patients is one way we can continue to improve our services. Please take a few minutes to complete the written survey that you may receive in the mail after you visit with us. Thank you!        [employer] Information     [employer] lets you send messages to your doctor, view your test results, renew your prescriptions, schedule appointments and more. To sign up, go to [email or web address] . Click on \"Log in\" on the left side of the screen, which will take you to the Welcome page. Then click on \"Sign up Now\" on the right side of the page.     You will be asked to enter the access code listed below, as well as some personal information. Please " follow the directions to create your username and password.     Your access code is: [email or web address]  Expires: 2019 10:37 PM     Your access code will  in 90 days. If you need help or a new code, please call your [place] clinic or [phone].        Care EveryWhere ID     This is your Care EveryWhere ID. This could be used by other organizations to access your [place] medical records  [id number]        Equal Access to Services     [facility] : [employer] hadasho Soomaali, waaxda luqadaha, qaybta kaalmada adeegyaradha, keely villanueva . So [facility] [phone].    ATENCIÓN: Si habla español, tiene a encarnacion disposición servicios gratuitos de asistencia lingüística. Llame al [phone].    We comply with applicable federal civil rights laws and [place] laws. We do not discriminate on the basis of race, color, national origin, age, disability, sex, sexual orientation, or gender identity.            After Visit Summary       This is your record. Keep this with you and show to your community pharmacist(s) and doctor(s) at your next visit.

## 2018-10-25 NOTE — ED PROVIDER NOTES
"  History     Chief Complaint   Patient presents with     Flank Pain     HPI  Hi Kinney is a 23 year old male with a history of recurrent kidney stones presents to the emergency room for evaluation of left flank pain which has resolved.  He reports that he usually gets kidney stones 1-2 times per year and yesterday he had left flank pain which resolved after he took a couple of Advil's.  He denies flank pain at this time.  Denies dysuria or increased urinary frequency.  Denies bloody urine.  Denies nausea or vomiting.  Denies fever or chills.  Reports a family history of kidney stones in his maternal side.    Problem List:    There are no active problems to display for this patient.       Past Medical History:    History reviewed. No pertinent past medical history.    Past Surgical History:    Past Surgical History:   Procedure Laterality Date     OTHER SURGICAL HISTORY      [id number],SKIN GRAFT,Right,calf     TONSILLECTOMY      No Comments Provided       Family History:    No family history on file.    Social History:  Marital Status:  Single [1]  Social History   Substance Use Topics     Smoking status: Passive Smoke Exposure - Never Smoker     Smokeless tobacco: Never Used      Comment: Quit smoking: family members smoke     Alcohol use Yes      Comment: rare        Medications:      ibuprofen (ADVIL/MOTRIN) 800 MG tablet   triamcinolone (KENALOG) 0.1 % ointment         Review of Systems   Constitutional: Negative for chills and fever.   Gastrointestinal: Negative for abdominal pain, nausea and vomiting.   Genitourinary: Negative for dysuria, flank pain, frequency and hematuria.       Physical Exam   BP: 151/80  Heart Rate: 114  Temp: 99.2  F (37.3  C)  Resp: 16  Height: 172.7 cm (5' 8\")  Weight: 97.1 kg (214 lb)  SpO2: 98 %      Physical Exam   Constitutional: He is oriented to person, place, and time. He appears well-developed and well-nourished. No distress.   HENT:   Head: Normocephalic and atraumatic. "   Eyes: Pupils are equal, round, and reactive to light.   Neck: Normal range of motion.   Cardiovascular: Normal rate, regular rhythm and normal heart sounds.    Abdominal: Soft.   Genitourinary:   Genitourinary Comments: Negative bilateral CVA tenderness.   Musculoskeletal: Normal range of motion.   Neurological: He is alert and oriented to person, place, and time.       ED Course     ED Course     Procedures  Patient denies flank pain at this time.  Negative bilateral CVA tenderness on examination.  Urinalysis with evidence of trace hematuria.  I suspect that patient had a kidney stone yesterday which passed however trace hematuria is as a result of mild scarring of the ureter from the stone.  White blood cell count the urine negative for active bladder or urinary infection.      Critical Care time:  none  Results for orders placed or performed during the hospital encounter of 10/24/18 (from the past 24 hour(s))   *UA reflex to Microscopic   Result Value Ref Range    Color Urine Yellow     Appearance Urine Clear     Glucose Urine Negative NEG^Negative mg/dL    Bilirubin Urine Negative NEG^Negative    Ketones Urine Negative NEG^Negative mg/dL    Specific Gravity Urine 1.015 1.000 - 1.030    Blood Urine Trace (A) NEG^Negative    pH Urine 6.5 5.0 - 9.0 pH    Protein Albumin Urine Negative NEG^Negative mg/dL    Urobilinogen Urine 0.2 0.2 - 1.0 EU/dL    Nitrite Urine Negative NEG^Negative    Leukocyte Esterase Urine Trace (A) NEG^Negative    Source Unspecified Urine    Urine Microscopic   Result Value Ref Range    WBC Urine 0 - 5 OTO5^0 - 5 /HPF    RBC Urine O - 2 OTO2^O - 2 /HPF    Squamous Epithelial /LPF Urine Few FEW^Few /LPF    Bacteria Urine Few (A) NEG^Negative /HPF       Medications - No data to display    Assessments & Plan (with Medical Decision Making)   Patient is a 23-year-old male with a history of recurrent kidney stones presenting with complaints of flank pain yesterday which has completely resolved.   He has negative bilateral CVA tenderness.  Urinalysis suggestive of for microscopic hematuria which could be as a result of a kidney stone which has passed.  I recommended the patient continues to increase his fluid intake on a daily basis.  I also recommended the patient should follow-up with his primary care provider for testing on particular kind of stone so prophylaxis can be implemented.  Patient requesting for excuse duty for 1 day so that he can properly rehydrate himself before going back to work.    I have reviewed the nursing notes.    I have reviewed the findings, diagnosis, plan and need for follow up with the patient.    New Prescriptions    No medications on file       Final diagnoses:   H/O left flank pain   Asymptomatic microscopic hematuria   History of kidney stones     Yaw Gardner MD  Emergency Medicine Physician  10/24/2018  10:35 PM      10/24/2018   [facility] [place]     Yaw Gardner MD  10/24/18 [id number]

## 2018-10-25 NOTE — DISCHARGE INSTRUCTIONS
Preventing Kidney Stones  If you ve had a kidney stone, you may worry that you ll have another. Removing or passing your stone doesn t prevent future stones. But with your healthcare provider s help, you can reduce your risk of forming new stones. Follow up with your healthcare provider to help find new stones. You may need follow-up every 3 months to a year for a lifetime.    Drink lots of water  Staying well-hydrated is the best way to reduce your risk of future stones. Drink 8 12-ounce glasses of water daily. Have 2 with each meal and 2 between meals. Try keeping a pitcher of water nearby during the day and at night.  Take medicines if needed  Medicines, including vitamins and minerals, may be prescribed for certain types of stones. You may want to write your doses and medicine times on a calendar. Some medicines decrease stone-forming chemicals in your blood. Others help prevent those chemicals from crystallizing in urine. Still others help keep a normal acid balance in your urine.  Follow your prescribed diet  Your healthcare provider will tell you which foods contain the chemicals you should avoid. Your healthcare provider may also suggest talking to a dietitian. He or she can help you plan meals you ll enjoy. These meals won t put you at risk for future stones. You may be told to limit certain foods, depending on which type of stones you ve had. You should limit the amount of salt in your food to about 2 grams a day. This will help prevent most types of kidney stones. Make sure you get an adequate amount of calcium in your diet.  For calcium oxalate stones: Limit animal protein, such as meat, eggs, and fish. Limit grapefruit juice and alcohol. Limit high-oxalate foods (such as cola, tea, chocolate, spinach, rhubarb, wheat bran, and peanuts).  For uric acid stones: Limit high-purine foods, such as mushrooms, peas, beans, anchovies, meat, poultry, shellfish, and organ meats. These foods increase uric acid  production.  For cystine stones: Limit high-methionine foods (fish is the most common, but eggs and meats, also). These foods increase production of cystine.  Date Last Reviewed: 2/1/2017 2000-2017 [employer]. [address] [place] [address], [place] [address]. All rights reserved. This information is not intended as a substitute for professional medical care. Always follow your healthcare professional's instructions.          Hematuria: Possible Causes     Many things can lead to blood in the urine (hematuria). The blood may be seen with the eye (macroscopic or gross hematuria). Or it may only be seen when the urine is looked at under a microscope (microscopic hematuria). Some of the most common causes of blood in the urine are listed below. Often, no cause for the blood can be found. This is called idiopathic hematuria.    Kidney or bladder stones are collections of crystals. They form in the urine. Stones may be found anywhere in the urinary tract. But they form most often in the kidneys or bladder. In addition to blood in the urine, they can cause severe pain.    BPH stands for benign prostatic hyperplasia. It is enlargement of the prostate gland. It happens as men age. BPH often causes problems with urination. It sometimes causes blood in the urine.    A urinary tract infection (UTI) is due to bacteria growing in the urinary tract. It can cause blood in the urine. Other symptoms include burning or pain with urination. You may need to urinate often or urgently. You may also have a fever.    Damage to the urinary tract may cause blood in the urine. This damage may be due to a blow or accident. It may also result from the use of a urinary catheter. Very hard exercise may sometimes irritate the urinary tract and cause bleeding.    Cancer may occur anywhere in the urinary tract. A tumor may sometimes cause no symptoms other than bleeding.     Other possible causes of bleeding include:    Prostatitis  (infection of the prostate gland)    Taking anticoagulants    Blockage in the urinary tract    Disease or inflammation of the kidney    Cystic diseases of the kidneys    Sickle cell anemia    Vigorous exercise    Endometriosis  Date Last Reviewed: 12/1/2016 2000-2017 [employer]. [address] [place] [address], [place] [address]. All rights reserved. This information is not intended as a substitute for professional medical care. Always follow your healthcare professional's instructions.

## 2018-10-25 NOTE — ED TRIAGE NOTES
Pt arrives to ED via private car.  Pt states that he feels he has kidney stones.  He has had them often.  Pt states that he is having left flank pain that throbs, pt denies N/V.  Pt states this has been going on for the past 3 days.  Pt states he does not have any issues with urination, but in the past he has had trouble with urination.

## 2018-12-04 ENCOUNTER — APPOINTMENT (OUTPATIENT)
Dept: FAMILY MEDICINE | Facility: OTHER | Age: 23
End: 2018-12-04
Attending: CHIROPRACTOR
Payer: COMMERCIAL

## 2018-12-04 PROCEDURE — 85025 COMPLETE CBC W/AUTO DIFF WBC: CPT | Performed by: CHIROPRACTOR

## 2018-12-04 PROCEDURE — 92552 PURE TONE AUDIOMETRY AIR: CPT | Performed by: CHIROPRACTOR

## 2018-12-04 PROCEDURE — 99199 UNLISTED SPECIAL SVC PX/RPRT: CPT | Performed by: CHIROPRACTOR

## 2018-12-04 PROCEDURE — 93005 ELECTROCARDIOGRAM TRACING: CPT | Performed by: CHIROPRACTOR

## 2018-12-04 PROCEDURE — 80053 COMPREHEN METABOLIC PANEL: CPT | Performed by: CHIROPRACTOR

## 2018-12-04 PROCEDURE — 81001 URINALYSIS AUTO W/SCOPE: CPT | Performed by: CHIROPRACTOR

## 2018-12-04 PROCEDURE — 80061 LIPID PANEL: CPT | Performed by: CHIROPRACTOR

## 2018-12-04 PROCEDURE — 36415 COLL VENOUS BLD VENIPUNCTURE: CPT | Performed by: CHIROPRACTOR

## 2019-01-02 ENCOUNTER — OFFICE VISIT (OUTPATIENT)
Dept: FAMILY MEDICINE | Facility: OTHER | Age: 24
End: 2019-01-02
Attending: NURSE PRACTITIONER
Payer: COMMERCIAL

## 2019-01-02 VITALS
HEART RATE: 100 BPM | OXYGEN SATURATION: 98 % | BODY MASS INDEX: 34.05 KG/M2 | DIASTOLIC BLOOD PRESSURE: 72 MMHG | SYSTOLIC BLOOD PRESSURE: 112 MMHG | TEMPERATURE: 97.1 F | HEIGHT: 69 IN | WEIGHT: 229.9 LBS

## 2019-01-02 DIAGNOSIS — J06.9 VIRAL UPPER RESPIRATORY TRACT INFECTION: Primary | ICD-10-CM

## 2019-01-02 PROCEDURE — 99214 OFFICE O/P EST MOD 30 MIN: CPT | Performed by: NURSE PRACTITIONER

## 2019-01-02 RX ORDER — PREDNISONE 20 MG/1
20 TABLET ORAL DAILY
Qty: 5 TABLET | Refills: 0 | Status: SHIPPED | OUTPATIENT
Start: 2019-01-02 | End: 2019-03-10

## 2019-01-02 ASSESSMENT — MIFFLIN-ST. JEOR: SCORE: 2020.32

## 2019-01-02 ASSESSMENT — PAIN SCALES - GENERAL: PAINLEVEL: NO PAIN (0)

## 2019-01-02 NOTE — PROGRESS NOTES
"Nursing Notes:   Shanice Viveros LPN  1/2/2019 12:42 PM  Sign at exiting of workspace  Chief Complaint   Patient presents with     Ear Problem       Medication Reconciliation: complete   Patient presents with ear feels stuffed for 2 days. Patient has had a cold which is getting better. Shanice Viveros LPN .............1/2/2019  12:39 PM      SUBJECTIVE:   Hi Kinney is a 23 year old male who presents to clinic today for the following health issues:    RESPIRATORY SYMPTOMS      Duration: Head cold for 3 days ear concerns for 2 days.     Description  nasal congestion, rhinorrhea, sore throat, facial pain/pressure, ear pain bilateral, fatigue/malaise and myalgias, feels dizzy at times.     Severity: severe    Accompanying signs and symptoms: None    History (predisposing factors):  none    Precipitating or alleviating factors: None    Therapies tried and outcome:  rest and fluids Nyquil, Tylenol cold and flu.       Problem list and histories reviewed & adjusted, as indicated.  Additional history: as documented    Current Outpatient Medications   Medication Sig Dispense Refill     ibuprofen (ADVIL/MOTRIN) 800 MG tablet Take 1 tablet (800 mg) by mouth every 8 hours as needed for moderate pain (Patient not taking: Reported on 1/2/2019) 30 tablet 0     triamcinolone (KENALOG) 0.1 % ointment Apply sparingly to affected area three times daily for 14 days. Mix with nystatin cream, enough to cover (Patient not taking: Reported on 1/2/2019) 30 g 0     Allergies   Allergen Reactions     Amoxicillin-Pot Clavulanate      Other reaction(s): *Unknown - Childhood Rxn     Cefprozil      Other reaction(s): *Unknown - Childhood Rxn         ROS:  Notable findings in the HPI.       OBJECTIVE:     /72 (BP Location: Left arm, Patient Position: Sitting, Cuff Size: Adult Regular)   Pulse 100   Temp 97.1  F (36.2  C) (Tympanic)   Ht 1.74 m (5' 8.5\")   Wt 104.3 kg (229 lb 14.4 oz)   SpO2 98%   BMI 34.44 kg/m    Body " mass index is 34.44 kg/m .  GENERAL: healthy, alert and no distress  EYES: Eyes grossly normal to inspection  HENT: normal cephalic/atraumatic, right ear: normal: no effusions, no erythema, normal landmarks, left ear: normal: no effusions, no erythema, normal landmarks, nose and mouth without ulcers or lesions, nasal mucosa edematous , rhinorrhea clear, oropharynx clear, oral mucous membranes moist and sinuses: not tender  NECK: no adenopathy  RESP: lungs clear to auscultation - no rales, rhonchi or wheezes  CV: regular rates and rhythm, normal S1 S2, no S3 or S4, no murmur, click or rub and no peripheral edema  SKIN: no suspicious lesions or rashes  PSYCH: mentation appears normal, affect normal/bright    Diagnostic Test Results:  none     ASSESSMENT/PLAN:     1. Viral upper respiratory tract infection  - predniSONE (DELTASONE) 20 MG tablet; Take 20 mg by mouth daily for 5 days.  Dispense: 5 tablet; Refill: 0    Discussed signs and symptoms. Discussed viral illness.  Discussed home cares, over-the-counter medicines that can be used.  Because he is having some mild dizziness with the ear congestion we will try some prednisone.  Follow-up in the clinic if he gets worse or fails to improve.  Did discuss that viral illnesses last often times 14 days.    PLAN:    URI Adult:  Tylenol, Ibuprofen, Fluids, Rest, OTC cough suppressant/expectorant, OTC decongestant/antihistamine, Saline gargles, Saline nasal spray and Vaporizer    Followup:    If not improving or if condition worsens, follow up with your Primary Care Provider    I explained my diagnostic considerations and recommendations to the patient, who voiced understanding and agreement with the treatment plan. All questions were answered. We discussed potential side effects of any prescribed or recommended therapies, as well as expectations for response to treatments. He was advised to contact our office if there is no improvement or worsening of conditions or  symptoms.  If s/s worsen or persist, patient will either come back or follow up with PCP.    Disclaimer:  This note consists of words and symbols derived from keyboarding, dictation, or using voice recognition software. As a result, there may be errors in the script that have gone undetected. Please consider this when interpreting information found in this note.      Rosibel Hernandez NP, 1/2/2019 12:44 PM

## 2019-01-02 NOTE — NURSING NOTE
Chief Complaint   Patient presents with     Ear Problem       Medication Reconciliation: complete   Patient presents with ear feels stuffed for 2 days. Patient has had a cold which is getting better. Shanice Viveros LPN .............1/2/2019  12:39 PM

## 2019-01-27 ENCOUNTER — TELEPHONE (OUTPATIENT)
Dept: FAMILY MEDICINE | Facility: OTHER | Age: 24
End: 2019-01-27

## 2019-01-27 ENCOUNTER — OFFICE VISIT (OUTPATIENT)
Dept: FAMILY MEDICINE | Facility: OTHER | Age: 24
End: 2019-01-27
Attending: NURSE PRACTITIONER
Payer: COMMERCIAL

## 2019-01-27 VITALS
BODY MASS INDEX: 33.44 KG/M2 | SYSTOLIC BLOOD PRESSURE: 134 MMHG | TEMPERATURE: 97.4 F | OXYGEN SATURATION: 100 % | WEIGHT: 223.2 LBS | HEART RATE: 85 BPM | DIASTOLIC BLOOD PRESSURE: 80 MMHG

## 2019-01-27 DIAGNOSIS — R31.29 MICROSCOPIC HEMATURIA: Primary | ICD-10-CM

## 2019-01-27 DIAGNOSIS — Z11.3 SCREEN FOR STD (SEXUALLY TRANSMITTED DISEASE): ICD-10-CM

## 2019-01-27 DIAGNOSIS — R39.89 URINARY PROBLEM: ICD-10-CM

## 2019-01-27 LAB
ALBUMIN UR-MCNC: NEGATIVE MG/DL
APPEARANCE UR: CLEAR
BILIRUB UR QL STRIP: NEGATIVE
C TRACH DNA SPEC QL PROBE+SIG AMP: NOT DETECTED
COLOR UR AUTO: YELLOW
GLUCOSE UR STRIP-MCNC: NEGATIVE MG/DL
HGB UR QL STRIP: ABNORMAL
KETONES UR STRIP-MCNC: NEGATIVE MG/DL
LEUKOCYTE ESTERASE UR QL STRIP: NEGATIVE
N GONORRHOEA DNA SPEC QL PROBE+SIG AMP: NOT DETECTED
NITRATE UR QL: NEGATIVE
PH UR STRIP: 6.5 PH (ref 5–9)
RBC #/AREA URNS AUTO: ABNORMAL /HPF
SOURCE: ABNORMAL
SP GR UR STRIP: 1.01 (ref 1–1.03)
SPECIMEN SOURCE: NORMAL
UROBILINOGEN UR STRIP-ACNC: 0.2 EU/DL (ref 0.2–1)
WBC #/AREA URNS AUTO: ABNORMAL /HPF

## 2019-01-27 PROCEDURE — 99213 OFFICE O/P EST LOW 20 MIN: CPT | Performed by: NURSE PRACTITIONER

## 2019-01-27 PROCEDURE — 87491 CHLMYD TRACH DNA AMP PROBE: CPT | Performed by: NURSE PRACTITIONER

## 2019-01-27 PROCEDURE — 81001 URINALYSIS AUTO W/SCOPE: CPT | Performed by: NURSE PRACTITIONER

## 2019-01-27 PROCEDURE — 87591 N.GONORRHOEAE DNA AMP PROB: CPT | Performed by: NURSE PRACTITIONER

## 2019-01-27 NOTE — NURSING NOTE
Chief Complaint   Patient presents with     Urinary Problem     Medication Reconciliation: complete     Patient is here today for a possible UTI. He is having burning with urination, frequency, that started yesterday.    Cathy Pharmmisa, CMA

## 2019-01-28 NOTE — TELEPHONE ENCOUNTER
Patient was called and notified of negative g/c lab results -per Jill Guidry.  Soraya NOVAK CMA...1/27/2019 6:54 PM

## 2019-03-10 ENCOUNTER — HOSPITAL ENCOUNTER (EMERGENCY)
Facility: OTHER | Age: 24
Discharge: HOME | End: 2019-03-10
Attending: EMERGENCY MEDICINE | Admitting: EMERGENCY MEDICINE
Payer: COMMERCIAL

## 2019-03-10 VITALS
RESPIRATION RATE: 20 BRPM | HEART RATE: 122 BPM | OXYGEN SATURATION: 96 % | TEMPERATURE: 97 F | WEIGHT: 223 LBS | SYSTOLIC BLOOD PRESSURE: 144 MMHG | DIASTOLIC BLOOD PRESSURE: 98 MMHG | BODY MASS INDEX: 33.8 KG/M2 | HEIGHT: 68 IN

## 2019-03-10 DIAGNOSIS — R30.0 DYSURIA: ICD-10-CM

## 2019-03-10 LAB
ALBUMIN UR-MCNC: NEGATIVE MG/DL
APPEARANCE UR: CLEAR
BACTERIA #/AREA URNS HPF: ABNORMAL /HPF
BILIRUB UR QL STRIP: NEGATIVE
COLOR UR AUTO: YELLOW
GLUCOSE UR STRIP-MCNC: NEGATIVE MG/DL
HGB UR QL STRIP: ABNORMAL
KETONES UR STRIP-MCNC: NEGATIVE MG/DL
LEUKOCYTE ESTERASE UR QL STRIP: NEGATIVE
NITRATE UR QL: NEGATIVE
PH UR STRIP: 6.5 PH (ref 5–9)
RBC #/AREA URNS AUTO: ABNORMAL /HPF
SOURCE: ABNORMAL
SP GR UR STRIP: 1.01 (ref 1–1.03)
UROBILINOGEN UR STRIP-ACNC: 0.2 EU/DL (ref 0.2–1)
WBC #/AREA URNS AUTO: ABNORMAL /HPF

## 2019-03-10 PROCEDURE — 99282 EMERGENCY DEPT VISIT SF MDM: CPT | Mod: Z6 | Performed by: EMERGENCY MEDICINE

## 2019-03-10 PROCEDURE — 81001 URINALYSIS AUTO W/SCOPE: CPT | Performed by: EMERGENCY MEDICINE

## 2019-03-10 PROCEDURE — 99283 EMERGENCY DEPT VISIT LOW MDM: CPT | Mod: 25 | Performed by: EMERGENCY MEDICINE

## 2019-03-10 PROCEDURE — 51798 US URINE CAPACITY MEASURE: CPT | Performed by: EMERGENCY MEDICINE

## 2019-03-10 ASSESSMENT — MIFFLIN-ST. JEOR: SCORE: 1981.02

## 2019-03-10 ASSESSMENT — ENCOUNTER SYMPTOMS
CHILLS: 0
NAUSEA: 0
ARTHRALGIAS: 0
VOMITING: 0
AGITATION: 0
DYSURIA: 0
SHORTNESS OF BREATH: 0
FEVER: 0

## 2019-03-10 NOTE — DISCHARGE INSTRUCTIONS
I do not know for sure if your prostate is actually enlarged, however I have attached some information about the symptoms of an enlarged prostate for you to review.  If your symptoms do not improve over the next 2 days you may want to consider going to clinic for a recheck.

## 2019-03-10 NOTE — ED TRIAGE NOTES
Patient presents to ED with intermittent L) lower flank pain and trouble urinating.  Patient states pain has been presents for 3 days and he has a hx of kidney stones.  Patient denies fever/chills.

## 2019-03-10 NOTE — ED PROVIDER NOTES
History     Chief Complaint   Patient presents with     Flank Pain     HPI  Hi Kinney is a 23 year old male who is here stating that for the last few days he has some trouble urinating.  He states that there is really not much pain at all but it feels like the urine is coming out more slowly.  He does get occasional flank pain.  He states that he has had previous kidney stones.  The fact that the urine is not coming out as quickly makes him worried that there is a stone blocking something and he just wanted to get that checked out.  Not feeling ill otherwise.    Allergies:  Allergies   Allergen Reactions     Amoxicillin-Pot Clavulanate      Other reaction(s): *Unknown - Childhood Rxn     Cefprozil      Other reaction(s): *Unknown - Childhood Rxn       Problem List:    There are no active problems to display for this patient.       Past Medical History:    History reviewed. No pertinent past medical history.    Past Surgical History:    Past Surgical History:   Procedure Laterality Date     OTHER SURGICAL HISTORY      [id number],SKIN GRAFT,Right,calf     TONSILLECTOMY      No Comments Provided       Family History:    History reviewed. No pertinent family history.    Social History:  Marital Status:  Single [1]  Social History     Tobacco Use     Smoking status: Passive Smoke Exposure - Never Smoker     Smokeless tobacco: Never Used     Tobacco comment: Quit smoking: family members smoke   Substance Use Topics     Alcohol use: Yes     Comment: rare     Drug use: No        Medications:      ibuprofen (ADVIL/MOTRIN) 800 MG tablet         Review of Systems   Constitutional: Negative for chills and fever.   HENT: Negative for congestion.    Eyes: Negative for visual disturbance.   Respiratory: Negative for shortness of breath.    Cardiovascular: Negative for chest pain.   Gastrointestinal: Negative for nausea and vomiting.   Genitourinary: Negative for dysuria.   Musculoskeletal: Negative for arthralgias.   Skin:  "Negative for rash.   Psychiatric/Behavioral: Negative for agitation.       Physical Exam   BP: (!) 144/98  Pulse: 122  Heart Rate: 122  Temp: 97  F (36.1  C)  Resp: 20  Height: 172.7 cm (5' 8\")  Weight: 101.2 kg (223 lb)  SpO2: 96 %      Physical Exam   Constitutional: He is oriented to person, place, and time. He appears well-developed and well-nourished. No distress.   HENT:   Head: Normocephalic and atraumatic.   Eyes: Conjunctivae are normal.   Neck: Neck supple.   Cardiovascular: Normal rate, regular rhythm and normal heart sounds.   Pulmonary/Chest: Effort normal and breath sounds normal.   Abdominal: Soft. Bowel sounds are normal. He exhibits no distension. There is no tenderness. There is no CVA tenderness.   Neurological: He is alert and oriented to person, place, and time.   Skin: Skin is warm and dry. He is not diaphoretic.   Nursing note and vitals reviewed.      ED Course        Procedures                 Results for orders placed or performed during the hospital encounter of 03/10/19 (from the past 24 hour(s))   *UA reflex to Microscopic   Result Value Ref Range    Color Urine Yellow     Appearance Urine Clear     Glucose Urine Negative NEG^Negative mg/dL    Bilirubin Urine Negative NEG^Negative    Ketones Urine Negative NEG^Negative mg/dL    Specific Gravity Urine 1.015 1.000 - 1.030    Blood Urine Small (A) NEG^Negative    pH Urine 6.5 5.0 - 9.0 pH    Protein Albumin Urine Negative NEG^Negative mg/dL    Urobilinogen Urine 0.2 0.2 - 1.0 EU/dL    Nitrite Urine Negative NEG^Negative    Leukocyte Esterase Urine Negative NEG^Negative    Source Midstream Urine    Urine Microscopic   Result Value Ref Range    WBC Urine 0 - 5 OTO5^0 - 5 /HPF    RBC Urine O - 2 OTO2^O - 2 /HPF    Bacteria Urine Few (A) NEG^Negative /HPF       Medications - No data to display    Assessments & Plan (with Medical Decision Making)     I have reviewed the nursing notes.    I have reviewed the findings, diagnosis, plan and need for " follow up with the patient.  Bladder scan shows less than 100 mL of urine.  Urinalysis is normal.  No sign of UTI.  His symptoms are that of a decreased stream which came on fairly abruptly.  He was concerned about a stone that might be causing this.  I told him that decreased to  stream would be due to something blocking the urethra and a stone from his kidney would not do this.  The most likely thing that could be decreasing his stream would be an enlarged prostate.  He is quite young for this and the symptoms came on quite abruptly.  He is not having any pain in the do not believe he has a prostatitis.  At this point I think it would be reasonable to just observe this longer.  If his symptoms do not resolve he could follow-up in clinic.       Medication List      There are no discharge medications for this visit.         Final diagnoses:   Dysuria       3/10/2019   [facility]     Alfonso Lang MD  03/10/19 0712

## 2019-03-10 NOTE — ED AVS SNAPSHOT
[facility]  [address]  [place] [address]  Phone:  [phone]  Fax:  [phone]                                    Hi Kinney   MRN: [id number]    Department:  [facility]   Date of Visit:  3/10/2019           After Visit Summary Signature Page    I have received my discharge instructions, and my questions have been answered. I have discussed any challenges I see with this plan with the nurse or doctor.    ..........................................................................................................................................  Patient/Patient Representative Signature      ..........................................................................................................................................  Patient Representative Print Name and Relationship to Patient    ..................................................               ................................................  Date                                   Time    ..........................................................................................................................................  Reviewed by Signature/Title    ...................................................              ..............................................  Date                                               Time          22EPIC Rev 08/18

## 2019-03-14 DIAGNOSIS — N20.0 NEPHROLITHIASIS: Primary | ICD-10-CM

## 2019-03-17 ENCOUNTER — ANESTHESIA EVENT (OUTPATIENT)
Dept: SURGERY | Facility: OTHER | Age: 24
End: 2019-03-17
Payer: COMMERCIAL

## 2019-03-17 ENCOUNTER — APPOINTMENT (OUTPATIENT)
Dept: CT IMAGING | Facility: OTHER | Age: 24
End: 2019-03-17
Attending: PHYSICIAN ASSISTANT
Payer: COMMERCIAL

## 2019-03-17 ENCOUNTER — ANESTHESIA (OUTPATIENT)
Dept: SURGERY | Facility: OTHER | Age: 24
End: 2019-03-17
Payer: COMMERCIAL

## 2019-03-17 ENCOUNTER — HOSPITAL ENCOUNTER (EMERGENCY)
Facility: OTHER | Age: 24
Discharge: HOME | End: 2019-03-17
Attending: PHYSICIAN ASSISTANT | Admitting: PHYSICIAN ASSISTANT
Payer: COMMERCIAL

## 2019-03-17 ENCOUNTER — HOSPITAL ENCOUNTER (EMERGENCY)
Facility: OTHER | Age: 24
Discharge: HOME | End: 2019-03-17
Attending: PHYSICIAN ASSISTANT | Admitting: UROLOGY
Payer: COMMERCIAL

## 2019-03-17 ENCOUNTER — APPOINTMENT (OUTPATIENT)
Dept: GENERAL RADIOLOGY | Facility: OTHER | Age: 24
End: 2019-03-17
Attending: UROLOGY
Payer: COMMERCIAL

## 2019-03-17 VITALS
RESPIRATION RATE: 16 BRPM | HEART RATE: 98 BPM | OXYGEN SATURATION: 98 % | HEIGHT: 68 IN | TEMPERATURE: 97.6 F | SYSTOLIC BLOOD PRESSURE: 125 MMHG | BODY MASS INDEX: 34.1 KG/M2 | WEIGHT: 225 LBS | DIASTOLIC BLOOD PRESSURE: 87 MMHG

## 2019-03-17 VITALS
DIASTOLIC BLOOD PRESSURE: 69 MMHG | OXYGEN SATURATION: 95 % | HEART RATE: 99 BPM | TEMPERATURE: 98.6 F | RESPIRATION RATE: 16 BRPM | SYSTOLIC BLOOD PRESSURE: 123 MMHG

## 2019-03-17 DIAGNOSIS — N48.89 PENILE PAIN: ICD-10-CM

## 2019-03-17 DIAGNOSIS — R30.0 DYSURIA: ICD-10-CM

## 2019-03-17 DIAGNOSIS — T19.4XXA FOREIGN BODY IN PENIS, INITIAL ENCOUNTER: ICD-10-CM

## 2019-03-17 DIAGNOSIS — N20.0 KIDNEY STONE: ICD-10-CM

## 2019-03-17 DIAGNOSIS — T19.4XXD: ICD-10-CM

## 2019-03-17 PROCEDURE — 74176 CT ABD & PELVIS W/O CONTRAST: CPT

## 2019-03-17 PROCEDURE — 99140 ANES COMP EMERGENCY COND: CPT | Performed by: NURSE ANESTHETIST, CERTIFIED REGISTERED

## 2019-03-17 PROCEDURE — 99285 EMERGENCY DEPT VISIT HI MDM: CPT | Mod: 25 | Performed by: PHYSICIAN ASSISTANT

## 2019-03-17 PROCEDURE — 25000125 ZZHC RX 250: Performed by: PHYSICIAN ASSISTANT

## 2019-03-17 PROCEDURE — 99204 OFFICE O/P NEW MOD 45 MIN: CPT | Mod: 57 | Performed by: UROLOGY

## 2019-03-17 PROCEDURE — 99285 EMERGENCY DEPT VISIT HI MDM: CPT | Mod: Z6 | Performed by: PHYSICIAN ASSISTANT

## 2019-03-17 PROCEDURE — 51798 US URINE CAPACITY MEASURE: CPT | Performed by: PHYSICIAN ASSISTANT

## 2019-03-17 PROCEDURE — 25500064 ZZH RX 255 OP 636: Performed by: UROLOGY

## 2019-03-17 PROCEDURE — 52356 CYSTO/URETERO W/LITHOTRIPSY: CPT | Performed by: NURSE ANESTHETIST, CERTIFIED REGISTERED

## 2019-03-17 PROCEDURE — 74420 UROGRAPHY RTRGR +-KUB: CPT

## 2019-03-17 PROCEDURE — C2617 STENT, NON-COR, TEM W/O DEL: HCPCS | Performed by: UROLOGY

## 2019-03-17 PROCEDURE — 82365 CALCULUS SPECTROSCOPY: CPT | Performed by: UROLOGY

## 2019-03-17 PROCEDURE — C1758 CATHETER, URETERAL: HCPCS | Performed by: UROLOGY

## 2019-03-17 PROCEDURE — 76499 UNLISTED DX RADIOGRAPHIC PX: CPT

## 2019-03-17 PROCEDURE — 74420 UROGRAPHY RTRGR +-KUB: CPT | Mod: 26 | Performed by: UROLOGY

## 2019-03-17 PROCEDURE — 27210794 ZZH OR GENERAL SUPPLY STERILE: Performed by: UROLOGY

## 2019-03-17 PROCEDURE — 25800030 ZZH RX IP 258 OP 636: Performed by: NURSE ANESTHETIST, CERTIFIED REGISTERED

## 2019-03-17 PROCEDURE — C1769 GUIDE WIRE: HCPCS | Performed by: UROLOGY

## 2019-03-17 PROCEDURE — 36000060 ZZH SURGERY LEVEL 3 W FLUORO 1ST 30 MIN: Performed by: UROLOGY

## 2019-03-17 PROCEDURE — 99282 EMERGENCY DEPT VISIT SF MDM: CPT | Performed by: PHYSICIAN ASSISTANT

## 2019-03-17 PROCEDURE — 25000128 H RX IP 250 OP 636

## 2019-03-17 PROCEDURE — 25800030 ZZH RX IP 258 OP 636: Performed by: PHYSICIAN ASSISTANT

## 2019-03-17 PROCEDURE — 36000058 ZZH SURGERY LEVEL 3 EA 15 ADDTL MIN: Performed by: UROLOGY

## 2019-03-17 PROCEDURE — 37000008 ZZH ANESTHESIA TECHNICAL FEE, 1ST 30 MIN: Performed by: UROLOGY

## 2019-03-17 PROCEDURE — 37000009 ZZH ANESTHESIA TECHNICAL FEE, EACH ADDTL 15 MIN: Performed by: UROLOGY

## 2019-03-17 PROCEDURE — 25000125 ZZHC RX 250: Performed by: NURSE ANESTHETIST, CERTIFIED REGISTERED

## 2019-03-17 PROCEDURE — 25800025 ZZH RX 258: Performed by: UROLOGY

## 2019-03-17 PROCEDURE — 25000128 H RX IP 250 OP 636: Performed by: NURSE ANESTHETIST, CERTIFIED REGISTERED

## 2019-03-17 PROCEDURE — 99282 EMERGENCY DEPT VISIT SF MDM: CPT | Mod: 25,27 | Performed by: PHYSICIAN ASSISTANT

## 2019-03-17 PROCEDURE — 52356 CYSTO/URETERO W/LITHOTRIPSY: CPT | Mod: 22 | Performed by: UROLOGY

## 2019-03-17 PROCEDURE — 71000014 ZZH RECOVERY PHASE 1 LEVEL 2 FIRST HR: Performed by: UROLOGY

## 2019-03-17 PROCEDURE — 99282 EMERGENCY DEPT VISIT SF MDM: CPT | Mod: Z6 | Performed by: PHYSICIAN ASSISTANT

## 2019-03-17 PROCEDURE — 25000125 ZZHC RX 250: Performed by: UROLOGY

## 2019-03-17 DEVICE — STENT URETERAL CONTOUR SOFT PERCUFLEX 6FRX26CM: Type: IMPLANTABLE DEVICE | Site: KIDNEY | Status: FUNCTIONAL

## 2019-03-17 RX ORDER — LIDOCAINE 40 MG/G
CREAM TOPICAL
Status: DISCONTINUED | OUTPATIENT
Start: 2019-03-17 | End: 2019-03-17 | Stop reason: HOSPADM

## 2019-03-17 RX ORDER — FENTANYL CITRATE 50 UG/ML
25-50 INJECTION, SOLUTION INTRAMUSCULAR; INTRAVENOUS
Status: DISCONTINUED | OUTPATIENT
Start: 2019-03-17 | End: 2019-03-17 | Stop reason: HOSPADM

## 2019-03-17 RX ORDER — ONDANSETRON 2 MG/ML
INJECTION INTRAMUSCULAR; INTRAVENOUS PRN
Status: DISCONTINUED | OUTPATIENT
Start: 2019-03-17 | End: 2019-03-17

## 2019-03-17 RX ORDER — HALOPERIDOL 5 MG/ML
0.5 INJECTION INTRAMUSCULAR
Status: DISCONTINUED | OUTPATIENT
Start: 2019-03-17 | End: 2019-03-18 | Stop reason: HOSPADM

## 2019-03-17 RX ORDER — SODIUM CHLORIDE 9 MG/ML
INJECTION, SOLUTION INTRAVENOUS CONTINUOUS
Status: DISCONTINUED | OUTPATIENT
Start: 2019-03-17 | End: 2019-03-18 | Stop reason: HOSPADM

## 2019-03-17 RX ORDER — KETOROLAC TROMETHAMINE 30 MG/ML
INJECTION, SOLUTION INTRAMUSCULAR; INTRAVENOUS PRN
Status: DISCONTINUED | OUTPATIENT
Start: 2019-03-17 | End: 2019-03-17

## 2019-03-17 RX ORDER — SODIUM CHLORIDE, SODIUM LACTATE, POTASSIUM CHLORIDE, CALCIUM CHLORIDE 600; 310; 30; 20 MG/100ML; MG/100ML; MG/100ML; MG/100ML
INJECTION, SOLUTION INTRAVENOUS CONTINUOUS
Status: DISCONTINUED | OUTPATIENT
Start: 2019-03-17 | End: 2019-03-17 | Stop reason: HOSPADM

## 2019-03-17 RX ORDER — PROPOFOL 10 MG/ML
INJECTION, EMULSION INTRAVENOUS CONTINUOUS PRN
Status: DISCONTINUED | OUTPATIENT
Start: 2019-03-17 | End: 2019-03-17

## 2019-03-17 RX ORDER — FENTANYL CITRATE 50 UG/ML
INJECTION, SOLUTION INTRAMUSCULAR; INTRAVENOUS PRN
Status: DISCONTINUED | OUTPATIENT
Start: 2019-03-17 | End: 2019-03-17

## 2019-03-17 RX ORDER — SCOLOPAMINE TRANSDERMAL SYSTEM 1 MG/1
1 PATCH, EXTENDED RELEASE TRANSDERMAL ONCE
Status: COMPLETED | OUTPATIENT
Start: 2019-03-17 | End: 2019-03-17

## 2019-03-17 RX ORDER — NAPROXEN SODIUM 550 MG/1
550 TABLET ORAL 2 TIMES DAILY WITH MEALS
Qty: 60 TABLET | Refills: 0 | Status: SHIPPED | OUTPATIENT
Start: 2019-03-17 | End: 2019-03-25

## 2019-03-17 RX ORDER — LIDOCAINE HYDROCHLORIDE 20 MG/ML
INJECTION, SOLUTION INFILTRATION; PERINEURAL PRN
Status: DISCONTINUED | OUTPATIENT
Start: 2019-03-17 | End: 2019-03-17

## 2019-03-17 RX ORDER — NALOXONE HYDROCHLORIDE 0.4 MG/ML
.1-.4 INJECTION, SOLUTION INTRAMUSCULAR; INTRAVENOUS; SUBCUTANEOUS
Status: DISCONTINUED | OUTPATIENT
Start: 2019-03-17 | End: 2019-03-17 | Stop reason: HOSPADM

## 2019-03-17 RX ORDER — BACITRACIN ZINC 500 [USP'U]/G
OINTMENT TOPICAL PRN
Status: DISCONTINUED | OUTPATIENT
Start: 2019-03-17 | End: 2019-03-17 | Stop reason: HOSPADM

## 2019-03-17 RX ORDER — HYDROMORPHONE HYDROCHLORIDE 1 MG/ML
.3-.5 INJECTION, SOLUTION INTRAMUSCULAR; INTRAVENOUS; SUBCUTANEOUS EVERY 10 MIN PRN
Status: DISCONTINUED | OUTPATIENT
Start: 2019-03-17 | End: 2019-03-17 | Stop reason: HOSPADM

## 2019-03-17 RX ORDER — DEXAMETHASONE SODIUM PHOSPHATE 4 MG/ML
INJECTION, SOLUTION INTRA-ARTICULAR; INTRALESIONAL; INTRAMUSCULAR; INTRAVENOUS; SOFT TISSUE PRN
Status: DISCONTINUED | OUTPATIENT
Start: 2019-03-17 | End: 2019-03-17

## 2019-03-17 RX ORDER — ONDANSETRON 2 MG/ML
4 INJECTION INTRAMUSCULAR; INTRAVENOUS EVERY 30 MIN PRN
Status: DISCONTINUED | OUTPATIENT
Start: 2019-03-17 | End: 2019-03-17 | Stop reason: HOSPADM

## 2019-03-17 RX ORDER — PROPOFOL 10 MG/ML
INJECTION, EMULSION INTRAVENOUS PRN
Status: DISCONTINUED | OUTPATIENT
Start: 2019-03-17 | End: 2019-03-17

## 2019-03-17 RX ORDER — MEPERIDINE HYDROCHLORIDE 50 MG/ML
12.5 INJECTION INTRAMUSCULAR; INTRAVENOUS; SUBCUTANEOUS
Status: DISCONTINUED | OUTPATIENT
Start: 2019-03-17 | End: 2019-03-17 | Stop reason: HOSPADM

## 2019-03-17 RX ORDER — HYDROCODONE BITARTRATE AND ACETAMINOPHEN 5; 325 MG/1; MG/1
1-2 TABLET ORAL EVERY 4 HOURS PRN
Status: DISCONTINUED | OUTPATIENT
Start: 2019-03-17 | End: 2019-03-18 | Stop reason: HOSPADM

## 2019-03-17 RX ORDER — KETAMINE HYDROCHLORIDE 10 MG/ML
INJECTION INTRAMUSCULAR; INTRAVENOUS PRN
Status: DISCONTINUED | OUTPATIENT
Start: 2019-03-17 | End: 2019-03-17

## 2019-03-17 RX ORDER — ONDANSETRON 4 MG/1
4 TABLET, ORALLY DISINTEGRATING ORAL EVERY 30 MIN PRN
Status: DISCONTINUED | OUTPATIENT
Start: 2019-03-17 | End: 2019-03-17 | Stop reason: HOSPADM

## 2019-03-17 RX ORDER — HYDROCODONE BITARTRATE AND ACETAMINOPHEN 5; 325 MG/1; MG/1
TABLET ORAL
Qty: 18 TABLET | Refills: 0 | Status: SHIPPED | OUTPATIENT
Start: 2019-03-17 | End: 2019-04-26

## 2019-03-17 RX ADMIN — FENTANYL CITRATE 50 MCG: 50 INJECTION, SOLUTION INTRAMUSCULAR; INTRAVENOUS at 16:37

## 2019-03-17 RX ADMIN — FENTANYL CITRATE 100 MCG: 50 INJECTION, SOLUTION INTRAMUSCULAR; INTRAVENOUS at 16:42

## 2019-03-17 RX ADMIN — Medication 3 ML: at 10:40

## 2019-03-17 RX ADMIN — SCOPALAMINE 1 PATCH: 1 PATCH, EXTENDED RELEASE TRANSDERMAL at 16:13

## 2019-03-17 RX ADMIN — FENTANYL CITRATE 50 MCG: 50 INJECTION, SOLUTION INTRAMUSCULAR; INTRAVENOUS at 16:52

## 2019-03-17 RX ADMIN — DEXAMETHASONE SODIUM PHOSPHATE 4 MG: 4 INJECTION, SOLUTION INTRA-ARTICULAR; INTRALESIONAL; INTRAMUSCULAR; INTRAVENOUS; SOFT TISSUE at 16:34

## 2019-03-17 RX ADMIN — SODIUM CHLORIDE, POTASSIUM CHLORIDE, SODIUM LACTATE AND CALCIUM CHLORIDE: 600; 310; 30; 20 INJECTION, SOLUTION INTRAVENOUS at 16:25

## 2019-03-17 RX ADMIN — FENTANYL CITRATE 100 MCG: 50 INJECTION, SOLUTION INTRAMUSCULAR; INTRAVENOUS at 17:01

## 2019-03-17 RX ADMIN — FENTANYL CITRATE 50 MCG: 50 INJECTION, SOLUTION INTRAMUSCULAR; INTRAVENOUS at 16:53

## 2019-03-17 RX ADMIN — FENTANYL CITRATE 50 MCG: 50 INJECTION, SOLUTION INTRAMUSCULAR; INTRAVENOUS at 16:34

## 2019-03-17 RX ADMIN — PROPOFOL 200 MG: 10 INJECTION, EMULSION INTRAVENOUS at 16:30

## 2019-03-17 RX ADMIN — LIDOCAINE HYDROCHLORIDE 80 MG: 20 INJECTION, SOLUTION INFILTRATION; PERINEURAL at 16:30

## 2019-03-17 RX ADMIN — PROPOFOL 250 MCG/KG/MIN: 10 INJECTION, EMULSION INTRAVENOUS at 16:31

## 2019-03-17 RX ADMIN — KETOROLAC TROMETHAMINE 30 MG: 30 INJECTION, SOLUTION INTRAMUSCULAR at 16:38

## 2019-03-17 RX ADMIN — SODIUM CHLORIDE: 9 INJECTION, SOLUTION INTRAVENOUS at 15:13

## 2019-03-17 RX ADMIN — Medication 25 MG: at 16:28

## 2019-03-17 RX ADMIN — ONDANSETRON 4 MG: 2 INJECTION INTRAMUSCULAR; INTRAVENOUS at 16:30

## 2019-03-17 RX ADMIN — FENTANYL CITRATE 100 MCG: 50 INJECTION, SOLUTION INTRAMUSCULAR; INTRAVENOUS at 17:18

## 2019-03-17 RX ADMIN — SODIUM CHLORIDE, SODIUM LACTATE, POTASSIUM CHLORIDE, AND CALCIUM CHLORIDE: 600; 310; 30; 20 INJECTION, SOLUTION INTRAVENOUS at 18:12

## 2019-03-17 RX ADMIN — MIDAZOLAM 2 MG: 1 INJECTION INTRAMUSCULAR; INTRAVENOUS at 16:28

## 2019-03-17 RX ADMIN — HALOPERIDOL LACTATE 0.5 MG: 5 INJECTION, SOLUTION INTRAMUSCULAR at 18:00

## 2019-03-17 ASSESSMENT — ENCOUNTER SYMPTOMS
CHEST TIGHTNESS: 0
DIFFICULTY URINATING: 1
SHORTNESS OF BREATH: 0
FEVER: 0
BRUISES/BLEEDS EASILY: 0
DIFFICULTY URINATING: 1
FLANK PAIN: 0
ABDOMINAL PAIN: 0
FREQUENCY: 1
ADENOPATHY: 0
FREQUENCY: 1
DYSURIA: 1
HEMATURIA: 0
CHILLS: 0
WOUND: 0
DYSURIA: 1
CONFUSION: 0
BACK PAIN: 0

## 2019-03-17 ASSESSMENT — MIFFLIN-ST. JEOR: SCORE: 1990.09

## 2019-03-17 NOTE — ANESTHESIA PREPROCEDURE EVALUATION
Anesthesia Pre-Procedure Evaluation    Patient: Hi Kinney   MRN: [id number] : 1995          Preoperative Diagnosis: kidney/ ureteral stone    Procedure(s):  COMBINED CYSTOSCOPY, URETEROSCOPY, LASER HOLMIUM LITHOTRIPSY URETER(S)    History reviewed. No pertinent past medical history.  Past Surgical History:   Procedure Laterality Date     OTHER SURGICAL HISTORY      [id number],SKIN GRAFT,Right,calf     TONSILLECTOMY      No Comments Provided       Anesthesia Evaluation     . Pt has had prior anesthetic.     History of anesthetic complications   - PONV        ROS/MED HX    ENT/Pulmonary:  - neg pulmonary ROS     Neurologic:  - neg neurologic ROS     Cardiovascular:  - neg cardiovascular ROS       METS/Exercise Tolerance:  >4 METS   Hematologic:  - neg hematologic  ROS       Musculoskeletal:  - neg musculoskeletal ROS       GI/Hepatic: Comment: Rare, 1-2 times/year    (+) GERD       Renal/Genitourinary:     (+) Nephrolithiasis ,       Endo:  - neg endo ROS       Psychiatric:  - neg psychiatric ROS       Infectious Disease:  - neg infectious disease ROS       Malignancy:      - no malignancy   Other:    - neg other ROS                      Physical Exam  Normal systems: cardiovascular, pulmonary and dental    Airway   Mallampati: II  TM distance: >3 FB  Neck ROM: full    Dental     Cardiovascular   Rhythm and rate: regular and normal      Pulmonary             Lab Results   Component Value Date    HGB 16.7 02/10/2015    HCT 48.3 02/10/2015     02/10/2015     02/10/2015    POTASSIUM 3.6 02/10/2015    CHLORIDE 103 02/10/2015    CO2 26 02/10/2015    BUN 9 02/10/2015    CR 0.96 02/10/2015     (H) 02/10/2015    ALICE 9.2 02/10/2015       Preop Vitals  BP Readings from Last 3 Encounters:   19 130/72   19 125/87   03/10/19 (!) 144/98    Pulse Readings from Last 3 Encounters:   19 114   19 98   03/10/19 122      Resp Readings from Last 3 Encounters:   19 16   19 16  "  03/10/19 20    SpO2 Readings from Last 3 Encounters:   03/17/19 97%   03/17/19 98%   03/10/19 96%      Temp Readings from Last 1 Encounters:   03/17/19 97.6  F (36.4  C) (Tympanic)    Ht Readings from Last 1 Encounters:   03/17/19 1.727 m (5' 8\")      Wt Readings from Last 1 Encounters:   03/17/19 102.1 kg (225 lb)    Estimated body mass index is 34.21 kg/m  as calculated from the following:    Height as of an earlier encounter on 3/17/19: 1.727 m (5' 8\").    Weight as of an earlier encounter on 3/17/19: 102.1 kg (225 lb).       Anesthesia Plan      History & Physical Review      ASA Status:  2 emergent.    NPO Status:  > 6 hours    Plan for General and LMA with Propofol induction. Maintenance will be TIVA.    PONV prophylaxis:  Ondansetron (or other 5HT-3) and Dexamethasone or Solumedrol       Postoperative Care      Consents  Anesthetic plan, risks, benefits and alternatives discussed with:  Patient..                 GAURAV MC, APRN CRNA  "

## 2019-03-17 NOTE — CONSULTS
I was asked to see this patient by Michael Iniguez PA-C and provide my opinion about the following:  Ureteral stone    Type of Visit  Consult    Chief Complaint  Ureteral stone  Urethral stone    HPI  Mr. Kinney is a 23 year old male who presents with left  ureteral stone and obstructing urethral stone.  The patient initially presented to the ED today and tried to go home but returned a few hours later with more pain and difficulty voiding.  At that time the patient underwent a CT scan which revealed a 7 mm obstructing left ureteral stone as well as urethral stones.  The patient currently denies fevers or chills.  The patient currently denies nausea or vomiting.  The patient has not undergone surgery in the past for stones.      Past Medical History  None    Past Surgical History  He  has a past surgical history that includes Tonsillectomy and other surgical history.    Medications    Current Facility-Administered Medications:      sodium chloride 0.9% infusion, , Intravenous, Continuous, Michael Iniguez PA-C, Last Rate: 75 mL/hr at 03/17/19 1894    Current Outpatient Medications:      ibuprofen (ADVIL/MOTRIN) 800 MG tablet, Take 1 tablet (800 mg) by mouth every 8 hours as needed for moderate pain, Disp: 30 tablet, Rfl: 0    Allergies  Allergies   Allergen Reactions     Amoxicillin-Pot Clavulanate      Other reaction(s): *Unknown - Childhood Rxn     Cefprozil      Other reaction(s): *Unknown - Childhood Rxn     Social History  He  reports that he is a non-smoker but has been exposed to tobacco smoke. he has never used smokeless tobacco. He reports that he drinks alcohol. He reports that he does not use drugs.  No drug abuse.    Family History  History reviewed. No pertinent family history.    Review of Systems  I personally reviewed the ROS with the patient.    Unintentional weight loss:  No  ecent fever/chills: No   Night sweats: No   Current skin rash: No   Recent hair loss: No   Heat intolerance: No   Cold  intolerance: No   Chest pain: No   Palpitations: No   Shortness of breath: No  Wheezing: No   Constipation: No   Diarrhea: No   Nausea: Yes    Vomiting: No   Kidney/side pain: Yes   Back pain: No  Frequent headaches: No  Dizziness: No   Leg swelling: No   Calf pain: No    Physical Exam  Vitals:    03/17/19 1525   BP: 130/72   Pulse: 114   Resp: 16   Temp: 97.6  F (36.4  C)   TempSrc: Tympanic   SpO2: 97%   Constitutional: No acute distress.  Alert and cooperative   Head: NCAT  Eyes: Conjunctivae normal  Cardiovascular: Regular rate.  Pulmonary/Chest: Respirations are even and non-labored bilaterally, no audible wheezing  Abdominal: Soft. No distension, tenderness, masses or guarding.   Neurological: A + O x 3.  Cranial Nerves II-XII grossly intact.  Extremities: ADELA x 4, Warm. No clubbing.  No cyanosis.    Skin: Pink, warm and dry.  No visible rashes noted.  Psychiatric:  Normal mood and affect  Back:  + left CVA tenderness.  - right CVA tenderness.  Genitourinary: nonpalpable bladder    Labs  Results for TAMMY WALKER (MRN [id number]) as of 3/17/2019 15:57   3/10/2019 06:20   Color Urine Yellow   Appearance Urine Clear   Glucose Urine Negative   Bilirubin Urine Negative   Ketones Urine Negative   Specific Gravity Urine 1.015   pH Urine 6.5   Protein Albumin Urine Negative   Urobilinogen Urine 0.2   Nitrite Urine Negative   Blood Urine Small (A)   Leukocyte Esterase Urine Negative   Source Midstream Urine   WBC Urine 0 - 5   RBC Urine O - 2   Bacteria Urine Few (A)       Results for orders placed or performed during the hospital encounter of 03/17/19   CT Abdomen Pelvis w/o Contrast    Narrative    PROCEDURE:  CT ABDOMEN PELVIS W/O CONTRAST    HISTORY:  Flank pain, recurrent stone disease suspected; Flank pain,  stone disease suspected; Urinary tract stone, known, symptomatic,  complications or risk factors    TECHNIQUE:  Helical CT of the abdomen and pelvis was performed without  intravenous  contrast.    COMPARISON:  2/10/2015    FINDINGS:      Evaluation of the solid organs is somewhat limited due to the lack of  intravenous contrast.    2 adjacent stones are present within the distal penile urethra  measuring 10 and 9 mm, respectively. Adjacent calculi in the mid left  ureter measure up to 7 mm each, without upstream hydronephrosis. There  is slight right ureteral fullness. No right sided calculus is  identified.    The noncontrast appearance of the liver, gallbladder, spleen, pancreas  and adrenal glands is unremarkable. The bowel is normal in caliber.  The appendix is normal.    No free fluid, free air or adenopathy is present.  No suspicious  osseous lesions are identified.      Impression    IMPRESSION:      2 adjacent stones within the distal penile urethra, measuring up to 10  mm. 2 adjacent stones in the mid left ureter without upstream left  hydronephrosis, measuring up to 7 mm.    JOAN RONDON MD       Imaging  CT stone study   3/17/2019  I personally reviewed and interpreted the images and report.  IMPRESSION:    2 adjacent stones within the distal penile urethra, measuring up to 10  mm. 2 adjacent stones in the mid left ureter without upstream left  hydronephrosis, measuring up to 7 mm.    Assessment  Mr. Kinney is a 23 year old male who presents with symptomatic left  ureteral stones and urethral stones.    Discussed the treatment options for the left stone including ureteroscopy with laser lithotripsy.    After explaining the risks, benefits, and alternatives a decision was made to proceed with ureteroscopy/laser lithotripsy.    The patient was explained the specific risks of bleeding, pain, infection, and ureteral injury.    In addition, the patient was told a stent may need to be placed which could result in urinary frequency, urgency, dysuria, and flank pain with voiding.    It would require an office based procedure to remove it at a later date.    Finally, the patient was told  "if the stone was very impacted, that we would place a stent and return at a later date to treat the stone.      Plan  The patient was scheduled and consented for \"left ureteroscopy and urethroscopy with holmium laser lithotripsy and stent placement\" in the OR  "

## 2019-03-17 NOTE — OR NURSING
PACU Transfer Note    Hi Kinney was transferred to Med/Surg via cart.  Equipment used for transport:  none.  Accompanied by:  RN  Prescriptions were: none present    PACU Respiratory Event Documentation     1) Episodes of Apnea greater than or equal to 10 seconds: no    2) Bradypnea - less than 8 breaths per minute: 16    3) Pain score on 0 to 10 scale: 4    4) Pain-sedation mismatch (yes or no): no    5) Repeated 02 desaturation less than 90% (yes or no): no    Anesthesia notified? (yes or no): yes    Any of the above events occuring repeatedly in separate 30 minute intervals may be considered recurrent PACU respiratory events.    Patient stable and meets phase 1 discharge criteria for transport from PACU.  Report given to Rosalee prior to transport. Agnieszka Beckwith RN on 3/17/2019 at 6:30 PM

## 2019-03-17 NOTE — ED PROVIDER NOTES
History     Chief Complaint   Patient presents with     Kidney Problem     This is a 23-year-old male who was seen earlier today due to a lodged renal calculi at the tip of his penis.  This was attempted to be extracted however the stone appears to be large enough where it may need surgical intervention.  I did review this with Dr. Peralta who is not on-call today but generously  answered his phone in order to answer my questions.  Dr. Peralta discussed seeing the patient tomorrow for surgical intervention since he was able to urinate.  Otherwise if the patient has increased difficulty urination he should return to the ER.  The patient has returned because he has increased burning with urination as well as difficulty urinating.  He is still able to urinate but it is painful and only minimal drops come out.  He denies any other issues.  His mother is here with him as well and she recently had a stent placed due to renal stones.  The patient did have a CT stone protocol ordered by Dr. Peralta and the patient's mother would like the patient to still have this performed today.          Matth    Allergies:  Allergies   Allergen Reactions     Amoxicillin-Pot Clavulanate      Other reaction(s): *Unknown - Childhood Rxn     Cefprozil      Other reaction(s): *Unknown - Childhood Rxn       Problem List:    There are no active problems to display for this patient.       Past Medical History:    History reviewed. No pertinent past medical history.    Past Surgical History:    Past Surgical History:   Procedure Laterality Date     OTHER SURGICAL HISTORY      [id number],SKIN GRAFT,Right,calf     TONSILLECTOMY      No Comments Provided       Family History:    History reviewed. No pertinent family history.    Social History:  Marital Status:  Single [1]  Social History     Tobacco Use     Smoking status: Passive Smoke Exposure - Never Smoker     Smokeless tobacco: Never Used     Tobacco comment: Quit smoking: family members smoke    Substance Use Topics     Alcohol use: Yes     Comment: rare     Drug use: No        Medications:      ibuprofen (ADVIL/MOTRIN) 800 MG tablet         Review of Systems   Constitutional: Negative for chills and fever.   HENT: Negative for congestion.    Eyes: Negative for visual disturbance.   Respiratory: Negative for chest tightness and shortness of breath.    Cardiovascular: Negative for chest pain.   Gastrointestinal: Negative for abdominal pain.   Genitourinary: Positive for decreased urine volume, difficulty urinating, dysuria, frequency, penile pain and urgency. Negative for hematuria, scrotal swelling and testicular pain.   Musculoskeletal: Negative for back pain.   Skin: Negative for rash and wound.   Neurological: Negative for syncope.   Hematological: Negative for adenopathy. Does not bruise/bleed easily.   Psychiatric/Behavioral: Negative for confusion.       Physical Exam          Physical Exam   Constitutional: He is oriented to person, place, and time. He appears well-developed and well-nourished. No distress.   HENT:   Head: Normocephalic and atraumatic.   Eyes: Conjunctivae are normal. No scleral icterus.   Neck: Neck supple.   Cardiovascular: Normal rate and regular rhythm.   Pulmonary/Chest: Effort normal.   Abdominal: Soft. There is no tenderness.   No CVA tenderness.   Genitourinary:   Genitourinary Comments: Foreign body near the opening of his glans penis.  This appears to be a renal stone.  Bladder scan initially shows 50 mL's.  Post voiding he had approximately 28 mL's.   Musculoskeletal: Normal range of motion. He exhibits no deformity.   Lymphadenopathy:     He has no cervical adenopathy.   Neurological: He is alert and oriented to person, place, and time.   Skin: Skin is warm and dry. Capillary refill takes less than 2 seconds. No rash noted. He is not diaphoretic.   Psychiatric: He has a normal mood and affect.       ED Course        Procedures               CT abdomen and pelvis without  contrast shows a 10 mm stone in his distal urethra.  Furthermore he has 2 other renal calculi approximately 7 mm which showed no hydronephrosis.  Awaiting actual radiological read.    No results found for this or any previous visit (from the past 24 hour(s)).    Medications   sodium chloride 0.9% infusion (not administered)       Assessments & Plan (with Medical Decision Making)     I have reviewed the nursing notes.    I have reviewed the findings, diagnosis, plan and need for follow up with the patient.         Medication List      There are no discharge medications for this visit.         Final diagnoses:   Foreign body in penis, subsequent encounter   Dysuria   Penile pain     Afebrile.  Vital signs stable.  Increasing dysuria with noted renal calculi blocking his urethra.  CT shows 10 with near stone as stated above.  I discussed the case with Dr. Peralta who will be coming in to see this patient in anticipation of surgical removal.  IV was established at this time with IV fluids for access during surgery.  The patient has been n.p.o. all day.  3/17/2019   [facility]     Michael Iniguez PA-C  03/17/19 1522

## 2019-03-17 NOTE — ANESTHESIA CARE TRANSFER NOTE
Patient: Hi Kinney    Procedure(s):  COMBINED CYSTOSCOPY, URETEROSCOPY, LASER HOLMIUM LITHOTRIPSY URETER(S)and stent placement    Diagnosis: kidney/ ureteral stone  Diagnosis Additional Information: No value filed.    Anesthesia Type:   General, LMA     Note:  Airway :Face Mask  Patient transferred to:PACU  Handoff Report: Identifed the Patient, Identified the Reponsible Provider, Reviewed the pertinent medical history, Discussed the surgical course, Reviewed Intra-OP anesthesia mangement and issues during anesthesia, Set expectations for post-procedure period and Allowed opportunity for questions and acknowledgement of understanding      Vitals: (Last set prior to Anesthesia Care Transfer)    CRNA VITALS  3/17/2019 1719 - 3/17/2019 1750      3/17/2019             Resp Rate (set):  10                Electronically Signed By: JASMINE SANTANA CRNA  March 17, 2019  5:50 PM

## 2019-03-17 NOTE — ED AVS SNAPSHOT
[facility]  [address]  [place] [address]  Phone:  [phone]  Fax:  [phone]                                    Hi Kinney   MRN: [id number]    Department:  [facility]   Date of Visit:  3/17/2019           After Visit Summary Signature Page    I have received my discharge instructions, and my questions have been answered. I have discussed any challenges I see with this plan with the nurse or doctor.    ..........................................................................................................................................  Patient/Patient Representative Signature      ..........................................................................................................................................  Patient Representative Print Name and Relationship to Patient    ..................................................               ................................................  Date                                   Time    ..........................................................................................................................................  Reviewed by Signature/Title    ...................................................              ..............................................  Date                                               Time          22EPIC Rev 08/18

## 2019-03-17 NOTE — ED NOTES
Provider attempted to extract stone with LET gel and adson. Unsuccessful attempt. Patient tolerated well.

## 2019-03-17 NOTE — ANESTHESIA POSTPROCEDURE EVALUATION
Patient: Hi Kinney    Procedure(s):  COMBINED CYSTOSCOPY, URETEROSCOPY, LASER HOLMIUM LITHOTRIPSY URETER(S)and stent placement    Diagnosis:kidney/ ureteral stone  Diagnosis Additional Information: No value filed.    Anesthesia Type:  General, LMA    Note:  Anesthesia Post Evaluation    Patient location during evaluation: PACU  Patient participation: Able to fully participate in evaluation  Level of consciousness: awake and alert  Pain management: adequate  Airway patency: patent  Cardiovascular status: acceptable  Respiratory status: acceptable  Hydration status: acceptable  PONV: controlled     Anesthetic complications: None          Last vitals:  Vitals:    03/17/19 1800 03/17/19 1805 03/17/19 1810   BP: (!) 122/101 134/88 136/79   Pulse: 109 111 91   Resp:  16    Temp:  97.7  F (36.5  C)    SpO2: 97% 99%          Electronically Signed By: JASMINE SANTANA CRNA  March 17, 2019  6:29 PM

## 2019-03-17 NOTE — ED TRIAGE NOTES
Patient presents to the ER with complaints of a kidney stone stuck at the end of the penis.  Patient states that he can see it.  No pain at this time

## 2019-03-17 NOTE — ED PROVIDER NOTES
"  History     Chief Complaint   Patient presents with     Flank Pain     can see stone at the end of penis     This is a 23-year-old male who was actually seen in the ER on 3/10/2019 for dysuria.  He was scheduled to see Dr. Peralta and Dr. Peralta has ordered a CT abdomen and pelvis without contrast but this is not been performed as yet.  There was concern of possible renal calculi.  However at this time the patient states that the the stone is currently at the tip of his penis and he can see this.  He has been able to urinate around the stone but it is somewhat difficult.  Denies any other issues.  He does report that it is uncomfortable with a sensation that he constantly has to urinate.              Allergies:  Allergies   Allergen Reactions     Amoxicillin-Pot Clavulanate      Other reaction(s): *Unknown - Childhood Rxn     Cefprozil      Other reaction(s): *Unknown - Childhood Rxn       Problem List:    There are no active problems to display for this patient.       Past Medical History:    No past medical history on file.    Past Surgical History:    Past Surgical History:   Procedure Laterality Date     OTHER SURGICAL HISTORY      [id number],SKIN GRAFT,Right,calf     TONSILLECTOMY      No Comments Provided       Family History:    No family history on file.    Social History:  Marital Status:  Single [1]  Social History     Tobacco Use     Smoking status: Passive Smoke Exposure - Never Smoker     Smokeless tobacco: Never Used     Tobacco comment: Quit smoking: family members smoke   Substance Use Topics     Alcohol use: Yes     Comment: rare     Drug use: No        Medications:      ibuprofen (ADVIL/MOTRIN) 800 MG tablet         Review of Systems   Genitourinary: Positive for difficulty urinating, dysuria, frequency and penile pain. Negative for flank pain and genital sores.       Physical Exam   BP: (!) 139/95  Pulse: 123  Temp: 97.6  F (36.4  C)  Resp: 20  Height: 172.7 cm (5' 8\")  Weight: 102.1 kg (225 lb)  SpO2: " 99 %      Physical Exam   Constitutional: He appears well-developed and well-nourished. No distress.   HENT:   Head: Normocephalic and atraumatic.   Eyes: Conjunctivae are normal. No scleral icterus.   Neck: Neck supple.   Cardiovascular: Normal rate and regular rhythm.   Pulmonary/Chest: Effort normal.   Abdominal: Soft. There is no tenderness.   Genitourinary: Penile tenderness present.   Genitourinary Comments: Examining the patient glans of his penis right at the opening he does have a foreign body that appears to be renal calculi.  Let gel was applied and removal was attempted with an absence.  However on further evaluation the stone appears to be much larger than the glans opening.  He is able to urinate with some difficulty and this was observed.   Musculoskeletal: He exhibits no deformity.   Lymphadenopathy:     He has no cervical adenopathy.   Neurological: He is alert.   Skin: Skin is warm and dry. No rash noted. He is not diaphoretic.   Psychiatric: He has a normal mood and affect.       ED Course        Procedures             No results found for this or any previous visit (from the past 24 hour(s)).    Medications   lidocaine 2 % (URO-JET) jelly 10 mL (10 mLs Urethral Not Given 3/17/19 1035)   lidocaine/EPINEPHrine/tetracaine (LET) solution KIT 3 mL (3 mLs Topical Given 3/17/19 1040)       Assessments & Plan (with Medical Decision Making)     I have reviewed the nursing notes.    I have reviewed the findings, diagnosis, plan and need for follow up with the patient.         Medication List      There are no discharge medications for this visit.         Final diagnoses:   Foreign body in penis, initial encounter - lodged renal calculi     Afebrile.  Vital signs stable.  Rather large what appears to be renal calculi at the tip of his penis.  He is able to urinate around this but it is painful and has some dysuria.  This was attempted to be removed after some let gel was applied with an absence but was  unsuccessful.  I did call Dr. Peralta who is not on-call today but he did respond and feels that the patient may best benefit by lasering the stone.  However since the patient can urinate but this can be arranged tomorrow in his clinic.  I discussed this with the patient he is to remain n.p.o. today in case he is unable to urinate in which case it will become more urgent to remove the stone sooner.  3/17/2019   [facility] PA[facility]  03/17/19 4713

## 2019-03-17 NOTE — OP NOTE
Preoperative diagnosis  Left ureteral stones  Urethral stone    Postoperative diagnosis  Left ureteral stones  Urethral stone    Procedure performed  Left ureteroscopy with holmium-YAG laser lithotripsy and basket extraction of stone fragments  Urethroscopy with holmium-YAG laser lithotripsy and basket extraction of stone fragments  Cystoscopy with left retrograde pyelogram and ureteral stent placement  Interpretation of retrograde    Surgeon  Veto Peralta MD    Surgeon(s)/Proceduralist(s) and Assistants (if any)  Surgeon(s):  Veto Peralta MD  Circulator: Danny Castelan RN  : Alissa Robertson  Scrub Person: Eva Wiseman  X-Ray Technologist: Su Painter    Specimen(s)  Yes  Stone fragments for biochemical analysis    (EBL) Estimated blood loss (ml)  5    Anesthesia  General    Complications  None    Findings  Cystoscopy revealed no tumors or other mucosal abnormalities.  There was a large, 1cm, fossa navicularis stone as seen on physical exam and CT imaging  Retrograde pyelogram revealed a  moderately dilated system with identification of the stone seen on imaging.    Indications  23 year old male agreed to undergo the above named procedure after discussion of the alternatives, risks and benefits.    Informed consent was obtained.      Procedure  The patient was taken to the operating room and placed supine on the operating table.  Pre-operative antibiotics were administered.  Bilateral lower extremity SCDs were placed.  After induction of general anesthesia the patient was positioned in dorsal lithotomy, prepped and draped in a sterile fashion.  A time-out was performed.      I passed a semirigid ureteroscope into the urethra and lasered the large 1cm stone in the fossa navicularis.  The stone broke up nicely and I basket extracted all the fragments.    I passed a 14 Syriac flexible cystoscope carefully via urethra into the bladder.  The left ureteral orifice was identified and a Sensor  wire was passed retrograde to the level of the kidney and confirmed by fluoroscopy.  The flexible scope was off-loaded and the bladder emptied with a straight catheter.  An 8-10 coaxial dilator was passed without difficulty and then removed.  The MR6A semirigid ureteroscope was passed carefully along the Sensor wire through the urethra and into the distal ureter.  The stone was encountered and fragmented with a 200-micron holmium YAG laser fiber at laser settings of 0.8 joules and a frequency of 8 Hz.  The fragments were basket extracted.  At the completion of the procedure, all clinically significant fragments were removed from the ureter and only dust-like fragments remained.  The ureteroscope was withdrawn.  A 5-English open-ended was passed over the wire and the wire removed.  A retrograde pyelogram was performed by slowly injecting 5 mL of 50% Omnipaque contrast via the 5 English catheter with findings described above.  An Amplatz super-stiff was placed to the level of the renal pelvis confirmed by fluoroscopy.  A 6 x 26 English stent was positioned with the upper end in the upper pole and the lower in the bladder confirmed by fluoroscopy. The bladder was emptied and the procedure was complete. The patient tolerated the procedure well and was stable throughout.    Plan  Follow up in clinic for stent pull: Friday, 3/22 or Monday, 3/25  Litholink x 2 planned          This case took 70 minutes because of the two distinct anatomic locations of a large stone burden.  He had a large stone x 2 located in the mid ureter as well as a 1cm stone located in the fossa navicularis.  This case typically takes 30-40 minutes and the 70 minutes this case took represents an increase in time by 50% due to the multiple stones and multiple anatomic locations.

## 2019-03-17 NOTE — ED TRIAGE NOTES
Patient reporting it is getting hard to pee and very painful due to stone blocking tip on meatus of penis. Patient resting comfortably in room with mom. Denies pain when not urinating.

## 2019-03-18 NOTE — PROGRESS NOTES
Discharge Note    Data:  Hi Kinney has been Discharged to home at 2215 via wheel chair accompanied by Dad.      Action:  Written discharge/follow-up instructions were provided to mother, father and patient. Prescriptions: were written and sent with patient  were sent to patient's pharmacy  none. Belongings sent with patient.Equipment None.     Response:  mother and father and patient verbalized understanding of discharge instructions, reason for discharge, and necessary follow-up appointments.  Rosalee Harvey RN.............................3/18/2019 3:56 AM

## 2019-03-18 NOTE — PLAN OF CARE
/69   Pulse 99   Temp 98.6  F (37  C) (Tympanic)   Resp 16   SpO2 95%     Pt remained VS throughout stay.  Pt stated he wanted to leave when penis incision had minimal bleeding.  Pt given discharge papers as well and ensured he had prescriptions.  Rosalee Harvey RN.............................3/18/2019 4:03 AM

## 2019-03-18 NOTE — PROGRESS NOTES
Admission Note    Data:  Hi Kinney admitted to 357 from surgery at 1845.      Action:  Dr. Herman has been notified of admission. Pt oriented to unit, call light in reach.     Response:  Patient tolerated transfer well.  A&O x4, ambulated to bed with stand by assist.  Rosalee Harvey RN.............................3/17/2019 8:09 PM

## 2019-03-18 NOTE — PROGRESS NOTES
/69   Pulse 99   Temp 98.6  F (37  C) (Tympanic)   Resp 16   SpO2 95%     Pt vitally stable.  LS clear, reg HR, no complaints of nausea or pain.  Draining blood from penis, decreases each time pt ambulates to bathroom, pt denies any light headedness upon ambulation.

## 2019-03-19 ENCOUNTER — TELEPHONE (OUTPATIENT)
Dept: UROLOGY | Facility: OTHER | Age: 24
End: 2019-03-19

## 2019-03-19 DIAGNOSIS — N39.0 URINARY TRACT INFECTION WITHOUT HEMATURIA, SITE UNSPECIFIED: Primary | ICD-10-CM

## 2019-03-19 RX ORDER — SULFAMETHOXAZOLE/TRIMETHOPRIM 800-160 MG
1 TABLET ORAL 2 TIMES DAILY
Qty: 10 TABLET | Refills: 0 | Status: SHIPPED | OUTPATIENT
Start: 2019-03-19 | End: 2019-03-25

## 2019-03-19 NOTE — TELEPHONE ENCOUNTER
----- Message from Lou Seth sent at 3/19/2019  8:37 AM CDT -----  Contact: [phone]  Patient would like a call re: where the 10mm stone was lodged is much more tender and is concern about infection.

## 2019-03-19 NOTE — TELEPHONE ENCOUNTER
Patient states that after surgery around his urethra opening was not red but starting yesterday it has turned red.  Denies fever, chills, tenderness.  Has been putting vaseline on it.  Cheeks got lani starting yesterday as well.    Fax received from [employer] stating naproxen 550mg tablet is not covered.  Directions state to take it BID for 3 days.  Patient since surgery has been taking ibuprofen 2 capsules (400mg) every 6 hours.  To Veto Peralta MD to address what to do with naproxen script and redness around urethra.  Yesi Martínez RN......March 19, 2019...9:27 AM

## 2019-03-20 NOTE — TELEPHONE ENCOUNTER
After name and date of birth verified, patient notified that antibiotic sent over to Avril Read LPN.......3/20/2019 9:46 AM

## 2019-03-20 NOTE — TELEPHONE ENCOUNTER
Should naproxen script be cancelled?  If not please advise as a PA is required for the medication.  Yesi Martínez RN......March 20, 2019...3:31 PM

## 2019-03-21 LAB
APPEARANCE STONE: NORMAL
COMPN STONE: NORMAL
NUMBER STONE: 4
SIZE STONE: NORMAL MM
WT STONE: 234 MG

## 2019-03-21 NOTE — TELEPHONE ENCOUNTER
Naproxen script to be cancelled.  [facility] and patient notified of this.  Yesi Martínez RN......March 21, 2019...2:45 PM

## 2019-03-25 ENCOUNTER — OFFICE VISIT (OUTPATIENT)
Dept: UROLOGY | Facility: OTHER | Age: 24
End: 2019-03-25
Attending: UROLOGY
Payer: COMMERCIAL

## 2019-03-25 VITALS — WEIGHT: 220.8 LBS | HEART RATE: 96 BPM | RESPIRATION RATE: 16 BRPM | BODY MASS INDEX: 33.57 KG/M2

## 2019-03-25 DIAGNOSIS — N20.0 KIDNEY STONES: Primary | ICD-10-CM

## 2019-03-25 LAB
ANION GAP SERPL CALCULATED.3IONS-SCNC: 9 MMOL/L (ref 3–14)
BUN SERPL-MCNC: 17 MG/DL (ref 7–25)
CALCIUM SERPL-MCNC: 9.7 MG/DL (ref 8.6–10.3)
CHLORIDE SERPL-SCNC: 103 MMOL/L (ref 98–107)
CO2 SERPL-SCNC: 26 MMOL/L (ref 21–31)
CREAT SERPL-MCNC: 1.25 MG/DL (ref 0.7–1.3)
GFR SERPL CREATININE-BSD FRML MDRD: 72 ML/MIN/{1.73_M2}
GLUCOSE SERPL-MCNC: 103 MG/DL (ref 70–105)
MAGNESIUM SERPL-MCNC: 1.9 MG/DL (ref 1.9–2.7)
PHOSPHATE SERPL-MCNC: 1.8 MG/DL (ref 2.5–5)
POTASSIUM SERPL-SCNC: 3.8 MMOL/L (ref 3.5–5.1)
PTH-INTACT SERPL-MCNC: 16 PG/ML (ref 12–88)
SODIUM SERPL-SCNC: 138 MMOL/L (ref 134–144)

## 2019-03-25 PROCEDURE — 84100 ASSAY OF PHOSPHORUS: CPT | Performed by: UROLOGY

## 2019-03-25 PROCEDURE — 83735 ASSAY OF MAGNESIUM: CPT | Performed by: UROLOGY

## 2019-03-25 PROCEDURE — 83970 ASSAY OF PARATHORMONE: CPT | Performed by: UROLOGY

## 2019-03-25 PROCEDURE — 80048 BASIC METABOLIC PNL TOTAL CA: CPT | Performed by: UROLOGY

## 2019-03-25 PROCEDURE — 52310 CYSTOSCOPY AND TREATMENT: CPT | Performed by: UROLOGY

## 2019-03-25 PROCEDURE — 99212 OFFICE O/P EST SF 10 MIN: CPT | Mod: 25 | Performed by: UROLOGY

## 2019-03-25 PROCEDURE — 36415 COLL VENOUS BLD VENIPUNCTURE: CPT | Performed by: UROLOGY

## 2019-03-25 ASSESSMENT — PAIN SCALES - GENERAL: PAINLEVEL: NO PAIN (0)

## 2019-03-25 NOTE — PATIENT INSTRUCTIONS
Home Care after Cystoscopy  Follow these guidelines for your care after your procedure.    Activity  No limitations    Bathing or showering  No limitations    Symptoms  You may notice some burning with urination but this usually resolves after 1-2 days.  You may also notice small amounts of blood in your urine.  Please increase water intake for the next few days to help with these symptoms.    Contacts  General Questions: [phone]  Appointments:  [phone]  Emergencies:  [phone]    When to call the clinic  If you develop any of the following symptoms please call the clinic immediately.  If the clinic is closed please be seen at an urgent care clinic or the Emergency Department.  - Burning with urination that worsens after 2 days  - Unable to urinate causing severe pelvic pain  - Fevers of greater than 101 degrees F  - Flank pain that is not responding to pain medication    Follow up  Please follow up as discussed at the appointment.      Please follow the below generic recommendations to help prevent stones.    If you are interested in undergoing a work up (including blood and urine tests) to determine your patient specific factors for why you form stones and ways to specifically prevent stones in the future, ask Dr Peralta if he hasn't already discussed this with you.      Fluids (Increase)  Please increase your fluid intake   Recommend about ten 10-ounce glasses of fluid per day (avoid dark breanna).  Please try and keep your urine clear or pale yellow.    If it is dark yellow or cloudy it is concentrated and you need to drink more fluid.  Try drinking a tall glass of water prior to every snack/meal.    Citrate (Increase)  Citrate prevents stone formation and is naturally found in urine.    It can be increased by adding concentrated lemon juice (4 ounces daily) and/or drinking diluted orange juice (50/50 with water).    Both MinuteMaid Light and Crystal Light also contain citrate and can be helpful for stone  prevention.    If you plan to drink sodas, I would recommend Diet/Sunkist and/or Diet/7UP as they contain the most citrate (which is good) compared to the breanna such as Coke/Pepsi.      Salt (Decrease)  Try to limit salt intake.  Total daily sodium intake should be less than 1500mg.  If you use salt with cooking don't add any additional salt at the table.    Protein  Limit protein intake to small to moderate portions.  For instance, a steak should be no larger than about the size of a deck of cards.  High protein intake leads to stone formation.      Component Value Flag Ref Range Units Status Collected Lab   Stone Composition SEE NOTE     Final 03/17/2019  5:56 PM [facility]   Comment:   (Note)   Calculi composed primarily of:   20% calcium oxalate (monohydrate and dihydrate), and   80% calcium phosphate (hydroxy- and carbonate- apatite).

## 2019-03-25 NOTE — PROGRESS NOTES
Preoperative diagnosis  Nephrolithiasis    Postoperative diagnosis  Nephrolithiasis    Procedure  Flexible cystourethroscopy with stent removal    Surgeon  Veto Peralta MD    Anesthesia  2% lidocaine jelly intraurethrally    Complications  None    Indications  23 year old male who is status post ureteroscopy with holmium laser lithotripsy who presents for stent removal.    Procedure  The patient was given one dose of antibiotics. The patient was placed in supine position and was prepped and draped in sterile fashion.  2% lidocaine jelly was bluntly injected per urethra without difficulty. I passed the 14 [place] flexible cystoscope through the urethra and into the bladder.  With the aid of a stent grasper I grasped and removed the stent in its entirety.  The patient tolerated the procedure well.    Plan  Discussed generic dietary approach to stone prevention- provided hand out  [facility] and f/u after        I spent 10 minutes on this patient's visit (exclusive of separately billed services/procedures) and over half of this time was spent in face-to-face counseling regarding stone prevention:  Prevention strategies with fluids and diet, rationale for a metabolic work up, prognosis and importance of compliance.

## 2019-03-25 NOTE — NURSING NOTE
Patient positioned in supine position, perineum area prepped with chlorhexidene Gluconate and patient draped per sterile technique. Per verbal order read back by Veto Peralta MD, Urojet 10mL 2% lidocaine jelly to be instilled into urethra.  Urojet- 10ml 2% Lidocaine jelly instilled into the urethra.    Urojet 2%  Lot#: [id number]  Expiration date: 9/20  : [employer]  NDC: [address]-6    [place] Protocol    A. Pre-procedure verification complete Yes  1-relevant information / documentation available, reviewed and properly matched to the patient; 2-consent accurate and complete, 3-equipment and supplies available    B. Site marking complete N/A  Site marked if not in continuous attendance with patient    C. TIME OUT completed Yes  Time Out was conducted just prior to starting procedure to verify the eight required elements: 1-patient identity, 2-consent accurate and complete, 3-position, 4-correct side/site marked (if applicable), 5-procedure, 6-relevant images / results properly labeled and displayed (if applicable), 7-antibiotics / irrigation fluids (if applicable), 8-safety precautions.    After procedure perineum area rinsed. Discharge instructions reviewed with patient. Patient verbalized understanding of discharge instructions and discharged ambulatory.  Yesi Martínez..................3/25/2019  8:36 AM

## 2019-04-22 ENCOUNTER — TELEPHONE (OUTPATIENT)
Dept: UROLOGY | Facility: OTHER | Age: 24
End: 2019-04-22

## 2019-04-22 NOTE — TELEPHONE ENCOUNTER
----- Message from Lou Seth sent at 4/22/2019 10:33 AM CDT -----  Regarding: questions about devan  Contact: [phone]  Please call patient at above # re: deavn questions.

## 2019-04-22 NOTE — TELEPHONE ENCOUNTER
Had questions about Litholink procedure, advised that he would need to contact [facility] directly for exact directions  Rosalinda Read LPN.......4/22/2019 11:09 AM

## 2019-04-26 ENCOUNTER — OFFICE VISIT (OUTPATIENT)
Dept: FAMILY MEDICINE | Facility: OTHER | Age: 24
End: 2019-04-26
Attending: NURSE PRACTITIONER
Payer: COMMERCIAL

## 2019-04-26 ENCOUNTER — HOSPITAL ENCOUNTER (OUTPATIENT)
Dept: CT IMAGING | Facility: OTHER | Age: 24
Discharge: HOME | End: 2019-04-26
Attending: NURSE PRACTITIONER | Admitting: NURSE PRACTITIONER
Payer: COMMERCIAL

## 2019-04-26 VITALS
TEMPERATURE: 97 F | WEIGHT: 227.5 LBS | DIASTOLIC BLOOD PRESSURE: 70 MMHG | RESPIRATION RATE: 20 BRPM | HEIGHT: 68 IN | HEART RATE: 72 BPM | SYSTOLIC BLOOD PRESSURE: 130 MMHG | BODY MASS INDEX: 34.48 KG/M2

## 2019-04-26 DIAGNOSIS — R10.9 FLANK PAIN: ICD-10-CM

## 2019-04-26 DIAGNOSIS — N20.0 CALCULUS OF KIDNEY: Primary | ICD-10-CM

## 2019-04-26 LAB
ALBUMIN SERPL-MCNC: 4.7 G/DL (ref 3.5–5.7)
ALBUMIN UR-MCNC: NEGATIVE MG/DL
ALP SERPL-CCNC: 98 U/L (ref 34–104)
ALT SERPL W P-5'-P-CCNC: 51 U/L (ref 7–52)
ANION GAP SERPL CALCULATED.3IONS-SCNC: 10 MMOL/L (ref 3–14)
APPEARANCE UR: CLEAR
AST SERPL W P-5'-P-CCNC: 20 U/L (ref 13–39)
BASOPHILS # BLD AUTO: 0.1 10E9/L (ref 0–0.2)
BASOPHILS NFR BLD AUTO: 0.5 %
BILIRUB SERPL-MCNC: 0.4 MG/DL (ref 0.3–1)
BILIRUB UR QL STRIP: NEGATIVE
BUN SERPL-MCNC: 14 MG/DL (ref 7–25)
CALCIUM SERPL-MCNC: 9.5 MG/DL (ref 8.6–10.3)
CHLORIDE SERPL-SCNC: 100 MMOL/L (ref 98–107)
CO2 SERPL-SCNC: 27 MMOL/L (ref 21–31)
COLOR UR AUTO: YELLOW
CREAT SERPL-MCNC: 1 MG/DL (ref 0.7–1.3)
DIFFERENTIAL METHOD BLD: ABNORMAL
EOSINOPHIL # BLD AUTO: 0.2 10E9/L (ref 0–0.7)
EOSINOPHIL NFR BLD AUTO: 1.3 %
ERYTHROCYTE [DISTWIDTH] IN BLOOD BY AUTOMATED COUNT: 13.2 % (ref 10–15)
GFR SERPL CREATININE-BSD FRML MDRD: >90 ML/MIN/{1.73_M2}
GLUCOSE SERPL-MCNC: 140 MG/DL (ref 70–105)
GLUCOSE UR STRIP-MCNC: NEGATIVE MG/DL
HCT VFR BLD AUTO: 48.7 % (ref 40–53)
HGB BLD-MCNC: 16.5 G/DL (ref 13.3–17.7)
HGB UR QL STRIP: NEGATIVE
IMM GRANULOCYTES # BLD: 0.2 10E9/L (ref 0–0.4)
IMM GRANULOCYTES NFR BLD: 2 %
KETONES UR STRIP-MCNC: NEGATIVE MG/DL
LEUKOCYTE ESTERASE UR QL STRIP: NEGATIVE
LYMPHOCYTES # BLD AUTO: 3.3 10E9/L (ref 0.8–5.3)
LYMPHOCYTES NFR BLD AUTO: 29.4 %
MCH RBC QN AUTO: 29.2 PG (ref 26.5–33)
MCHC RBC AUTO-ENTMCNC: 33.9 G/DL (ref 31.5–36.5)
MCV RBC AUTO: 86 FL (ref 78–100)
MONOCYTES # BLD AUTO: 0.7 10E9/L (ref 0–1.3)
MONOCYTES NFR BLD AUTO: 6.5 %
NEUTROPHILS # BLD AUTO: 6.7 10E9/L (ref 1.6–8.3)
NEUTROPHILS NFR BLD AUTO: 60.3 %
NITRATE UR QL: NEGATIVE
PH UR STRIP: 6.5 PH (ref 5–9)
PLATELET # BLD AUTO: 243 10E9/L (ref 150–450)
POTASSIUM SERPL-SCNC: 3.5 MMOL/L (ref 3.5–5.1)
PROT SERPL-MCNC: 7.2 G/DL (ref 6.4–8.9)
RBC # BLD AUTO: 5.66 10E12/L (ref 4.4–5.9)
SODIUM SERPL-SCNC: 137 MMOL/L (ref 134–144)
SOURCE: NORMAL
SP GR UR STRIP: 1.01 (ref 1–1.03)
UROBILINOGEN UR STRIP-ACNC: 0.2 EU/DL (ref 0.2–1)
WBC # BLD AUTO: 11.2 10E9/L (ref 4–11)

## 2019-04-26 PROCEDURE — 36415 COLL VENOUS BLD VENIPUNCTURE: CPT | Mod: ZL | Performed by: NURSE PRACTITIONER

## 2019-04-26 PROCEDURE — 80053 COMPREHEN METABOLIC PANEL: CPT | Mod: ZL | Performed by: NURSE PRACTITIONER

## 2019-04-26 PROCEDURE — 81003 URINALYSIS AUTO W/O SCOPE: CPT | Mod: ZL | Performed by: NURSE PRACTITIONER

## 2019-04-26 PROCEDURE — 74176 CT ABD & PELVIS W/O CONTRAST: CPT | Mod: TC

## 2019-04-26 PROCEDURE — 85025 COMPLETE CBC W/AUTO DIFF WBC: CPT | Mod: ZL | Performed by: NURSE PRACTITIONER

## 2019-04-26 PROCEDURE — 99214 OFFICE O/P EST MOD 30 MIN: CPT | Performed by: NURSE PRACTITIONER

## 2019-04-26 ASSESSMENT — MIFFLIN-ST. JEOR: SCORE: 2001.43

## 2019-04-26 ASSESSMENT — PAIN SCALES - GENERAL: PAINLEVEL: MODERATE PAIN (5)

## 2019-04-26 NOTE — PROGRESS NOTES
SUBJECTIVE:   Hi Kinney is a 23 year old male who presents to clinic today for the following health issues:    HPI  Presents with left flank pain for the past 2 days. Has a hisory of kidney stones. Had a stent removed after surgery 4 weeks ago. No urinary symptoms, no fever. Is drinking a lot so urinating more than usual.   Isn't sure if it's another stone or infection.     Patient Active Problem List    Diagnosis Date Noted     Calculus of kidney 04/27/2019     Priority: Medium     History reviewed. No pertinent past medical history.   Past Surgical History:   Procedure Laterality Date     COMBINED CYSTOSCOPY, URETEROSCOPY, LASER HOLMIUM LITHOTRIPSY URETER(S) Left 3/17/2019    Procedure: COMBINED CYSTOSCOPY, URETEROSCOPY, LASER HOLMIUM LITHOTRIPSY URETER(S)and stent placement;  Surgeon: Veto Peralta MD;  Location:  OR     OTHER SURGICAL HISTORY      TCG873,SKIN GRAFT,Right,calf     TONSILLECTOMY      No Comments Provided     History reviewed. No pertinent family history.  Social History     Tobacco Use     Smoking status: Passive Smoke Exposure - Never Smoker     Smokeless tobacco: Never Used     Tobacco comment: Quit smoking: family members smoke   Substance Use Topics     Alcohol use: Yes     Comment: rare     Social History     Social History Narrative    Lives with both parents, has a younger sister.    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     Current Outpatient Medications   Medication Sig Dispense Refill     ibuprofen (ADVIL/MOTRIN) 800 MG tablet Take 1 tablet (800 mg) by mouth every 8 hours as needed for moderate pain 30 tablet 0     Allergies   Allergen Reactions     Cefprozil      Other reaction(s): *Unknown - Childhood Rxn     Augmentin Rash     Childhood reaction       Review of Systems   Constitutional: Negative.    HENT: Negative.    Respiratory: Negative.    Cardiovascular: Negative.    Gastrointestinal: Negative for abdominal distention,  "constipation, diarrhea, heartburn, nausea and vomiting.        LEFT Flank pain   Genitourinary: Negative.    Musculoskeletal: Positive for back pain.   Skin: Negative.    Neurological: Negative.         OBJECTIVE:     /70 (BP Location: Left arm, Patient Position: Sitting, Cuff Size: Adult Large)   Pulse 72   Temp 97  F (36.1  C) (Tympanic)   Resp 20   Ht 1.727 m (5' 8\")   Wt 103.2 kg (227 lb 8 oz)   BMI 34.59 kg/m    Body mass index is 34.59 kg/m .  Physical Exam   Constitutional: He is oriented to person, place, and time. He appears well-developed and well-nourished. No distress.   HENT:   Head: Normocephalic.   Right Ear: External ear normal.   Left Ear: External ear normal.   Nose: Nose normal.   Mouth/Throat: Oropharynx is clear and moist. No oropharyngeal exudate.   Eyes: Conjunctivae are normal. No scleral icterus.   Neck: Normal range of motion. Neck supple.   Cardiovascular: Normal rate, regular rhythm and normal heart sounds.   Pulmonary/Chest: Effort normal and breath sounds normal.   Abdominal: Soft. Normal appearance and bowel sounds are normal. There is CVA tenderness. There is no rigidity and no guarding.   Musculoskeletal: Normal range of motion.   Neurological: He is alert and oriented to person, place, and time.   Skin: Skin is warm and dry.   Psychiatric: He has a normal mood and affect. His behavior is normal.   Nursing note and vitals reviewed.      Diagnostic Test Results:  Results for orders placed or performed in visit on 04/26/19 (from the past 24 hour(s))   UA reflex to Microscopic and Culture   Result Value Ref Range    Color Urine Yellow     Appearance Urine Clear     Glucose Urine Negative NEG^Negative mg/dL    Bilirubin Urine Negative NEG^Negative    Ketones Urine Negative NEG^Negative mg/dL    Specific Gravity Urine 1.010 1.000 - 1.030    Blood Urine Negative NEG^Negative    pH Urine 6.5 5.0 - 9.0 pH    Protein Albumin Urine Negative NEG^Negative mg/dL    Urobilinogen Urine " 0.2 0.2 - 1.0 EU/dL    Nitrite Urine Negative NEG^Negative    Leukocyte Esterase Urine Negative NEG^Negative    Source Midstream Urine    Comprehensive metabolic panel   Result Value Ref Range    Sodium 137 134 - 144 mmol/L    Potassium 3.5 3.5 - 5.1 mmol/L    Chloride 100 98 - 107 mmol/L    Carbon Dioxide 27 21 - 31 mmol/L    Anion Gap 10 3 - 14 mmol/L    Glucose 140 (H) 70 - 105 mg/dL    Urea Nitrogen 14 7 - 25 mg/dL    Creatinine 1.00 0.70 - 1.30 mg/dL    GFR Estimate >90 >60 mL/min/[1.73_m2]    GFR Estimate If Black >90 >60 mL/min/[1.73_m2]    Calcium 9.5 8.6 - 10.3 mg/dL    Bilirubin Total 0.4 0.3 - 1.0 mg/dL    Albumin 4.7 3.5 - 5.7 g/dL    Protein Total 7.2 6.4 - 8.9 g/dL    Alkaline Phosphatase 98 34 - 104 U/L    ALT 51 7 - 52 U/L    AST 20 13 - 39 U/L   CBC with platelets differential   Result Value Ref Range    WBC 11.2 (H) 4.0 - 11.0 10e9/L    RBC Count 5.66 4.4 - 5.9 10e12/L    Hemoglobin 16.5 13.3 - 17.7 g/dL    Hematocrit 48.7 40.0 - 53.0 %    MCV 86 78 - 100 fl    MCH 29.2 26.5 - 33.0 pg    MCHC 33.9 31.5 - 36.5 g/dL    RDW 13.2 10.0 - 15.0 %    Platelet Count 243 150 - 450 10e9/L    Diff Method Automated Method     % Neutrophils 60.3 %    % Lymphocytes 29.4 %    % Monocytes 6.5 %    % Eosinophils 1.3 %    % Basophils 0.5 %    % Immature Granulocytes 2.0 %    Absolute Neutrophil 6.7 1.6 - 8.3 10e9/L    Absolute Lymphocytes 3.3 0.8 - 5.3 10e9/L    Absolute Monocytes 0.7 0.0 - 1.3 10e9/L    Absolute Eosinophils 0.2 0.0 - 0.7 10e9/L    Absolute Basophils 0.1 0.0 - 0.2 10e9/L    Abs Immature Granulocytes 0.2 0 - 0.4 10e9/L   CT Abdomen Pelvis w/o Contrast    Narrative    EXAM:    CT Abdomen and Pelvis Without Contrast     EXAM DATE/TIME:    4/26/2019 7:04 PM     CLINICAL HISTORY:    23 years old, male; Unspecified abdominal pain; Left lower quadrant; Patient   HX: Patient has a history of kidney stones. Patients pain is more on the   lateral side; Additional info: Abd pain, unspecified     TECHNIQUE:     Imaging protocol: Axial computed tomography images of the abdomen and pelvis   without contrast. Coronal and sagittal reformatted images were created and   reviewed.    Radiation optimization: All CT scans at this facility use at least one of   these dose optimization techniques: automated exposure control; mA and/or kV   adjustment per patient size (includes targeted exams where dose is matched to   clinical indication); or iterative reconstruction.     COMPARISON:    CT ABDOMEN PELVIS W/O CONTRAST 3/17/2019 12:53 PM     FINDINGS:     ABDOMEN:    Liver: Mild diffuse hepatic steatosis.    Gallbladder and bile ducts: Normal. No calcified stones. No ductal dilation.    Pancreas: Normal. No ductal dilation.    Spleen: Normal. No splenomegaly.    Adrenals: Normal. No mass.    Kidneys and ureters: Stable mild fullness of the renal collecting system. 1 mm   nonobstructing left lower pole renal calculus.    Stomach and bowel: Normal. No obstruction. No mucosal thickening.    Appendix: A normal appendix.     PELVIS:    Bladder: Unremarkable as visualized.    Reproductive: Unremarkable as visualized.     ABDOMEN and PELVIS:    Intraperitoneal space: Normal. No free air. No significant fluid collection.    Bones/joints: No acute fracture. No dislocation.    Soft tissues: Unremarkable.    Vasculature: Normal. No abdominal aortic aneurysm.    Lymph nodes: Normal. No enlarged lymph nodes.       Impression    IMPRESSION:   1. No acute osseous pelvic CT findings.   2. Stable mild fullness of the right renal collecting system. Is there chronic   in nature.   3. 1 mm nonobstructing left renal calculus.     THIS DOCUMENT HAS BEEN ELECTRONICALLY SIGNED BY ERLIN JAMISON MD       ASSESSMENT/PLAN:       ICD-10-CM    1. Calculus of kidney N20.0    2. Flank pain R10.9 CT Abdomen Pelvis w/o Contrast     Comprehensive metabolic panel     CBC with platelets differential     UA reflex to Microscopic and Culture       PLAN:  Pt is afebrile, has  lower left back pain on exam, is otherwise benign.  Patient presents with reported back pain that he has often gotten with his kidney stones.  Lab work is essentially benign.   His CT shows 1 mm non-obstructing stone.   No evidence of infectious process, his urine is negative.   Reviewed results with patient.   Reassurance given.   Advised to f/u with Dr. Peralta as needed.  Return to ED of sx worsen or concerns develop.    I explained my diagnostic considerations and recommendations to patient who voiced understanding and agreement with the treatment plan. All questions were answered. We discussed potential side effects of any prescribed or recommended therapies, as well as expectations for response to treatments.    Disclaimer:  This note consists of words and symbols derived from keyboarding, dictation, or using voice recognition software. As a result, there may be errors in the script that have gone undetected. Please consider this when interpreting information found in this note.      JASMINE Gauthier, NP-C  4/26/2019 at 6:18 PM  [facility]

## 2019-04-26 NOTE — NURSING NOTE
Patient presents in the clinic with concerns of left sided flank pain that began over the last few days, patient reports 6 weeks ago he had multiple kidney stones removed from the left kidney. denies any pain with urination, or urgency or frequency.  Valencia Bland LPN 4/26/2019 6:13 PM    Chief Complaint   Patient presents with     Flank Pain       Medication Reconciliation: complete    Lisandra Bland LPN

## 2019-04-27 PROBLEM — N20.0 CALCULUS OF KIDNEY: Status: ACTIVE | Noted: 2019-04-27

## 2019-04-27 ASSESSMENT — ENCOUNTER SYMPTOMS
ROS GI COMMENTS: LEFT FLANK PAIN
DIARRHEA: 0
HEARTBURN: 0
RESPIRATORY NEGATIVE: 1
CARDIOVASCULAR NEGATIVE: 1
CONSTIPATION: 0
NEUROLOGICAL NEGATIVE: 1
ABDOMINAL DISTENTION: 0
BACK PAIN: 1
NAUSEA: 0
CONSTITUTIONAL NEGATIVE: 1
VOMITING: 0

## 2019-04-27 NOTE — PATIENT INSTRUCTIONS
IMPRESSION:   1. No acute osseous pelvic CT findings.   2. Stable mild fullness of the right renal collecting system. Is there chronic   in nature.   3. 1 mm nonobstructing left renal calculus.     Continue to push fluids and follow up with Urology as scheduled.

## 2019-04-29 ENCOUNTER — OFFICE VISIT (OUTPATIENT)
Dept: UROLOGY | Facility: OTHER | Age: 24
End: 2019-04-29
Attending: UROLOGY
Payer: COMMERCIAL

## 2019-04-29 VITALS
HEART RATE: 80 BPM | SYSTOLIC BLOOD PRESSURE: 120 MMHG | RESPIRATION RATE: 16 BRPM | DIASTOLIC BLOOD PRESSURE: 68 MMHG | BODY MASS INDEX: 34.21 KG/M2 | WEIGHT: 225 LBS

## 2019-04-29 DIAGNOSIS — N20.0 KIDNEY STONES: Primary | ICD-10-CM

## 2019-04-29 PROCEDURE — 99213 OFFICE O/P EST LOW 20 MIN: CPT | Performed by: UROLOGY

## 2019-04-29 ASSESSMENT — PAIN SCALES - GENERAL: PAINLEVEL: NO PAIN (1)

## 2019-04-29 NOTE — PROGRESS NOTES
Type of Visit  Established    Chief Complaint  History of kidney stones  Left flank pain    HPI  Mr. Kinney is a 23 year old male with history of kidney stones.  The patient recently underwent Left URS on 3/2019 for a large upper ureteral stone burden as well as a 1 cm stone stuck at his meatus.  The patient recovered and the stent was removed.  A few days ago he started developing left flank pain and was concerned he had a recurrent stone.  He was seen in a CT scan was obtained.  The report mentioned a 1 mm left kidney stone and he follows up today as he is concerned he formed new stone.  I recommended a Litholink work-up at the stent pole and he states he just submitted the urine testing.  He plans to follow-up on the results have returned.  He denies dysuria and fevers.  The pain is worse with leaning over or bending at the waist.    Current Prevention Strategies  Medical management for stone prevention includes the following: None  Dietary management for stone prevention includes the following: None      Review of Systems  I reviewed the ROS with the patient.    Nursing Notes:   Rosalinda Reda LPN  4/29/2019  1:10 PM  Signed  Pt presents to clinic for follow up on new kidney stone    Review of Systems:    Weight loss:    No     Recent fever/chills:  No   Night sweats:   No  Current skin rash:  No   Recent hair loss:  No  Heat intolerance:  No   Cold intolerance:  No  Chest pain:   No   Palpitations:   No  Shortness of breath:  No   Wheezing:   No  Constipation:    No   Diarrhea:   No   Nausea:   No   Vomiting:   No   Kidney/side pain:  Yes   Back pain:   No  Frequent headaches:  No   Dizziness:     No  Leg swelling:   No   Calf pain:    No          Family History  History reviewed. No pertinent family history.    Physical Exam  /68 (BP Location: Left arm, Patient Position: Sitting, Cuff Size: Adult Large)   Pulse 80   Resp 16   Wt 102.1 kg (225 lb)   BMI 34.21 kg/m    Constitutional: NAD,  WDWN.  Cardiovascular: Regular rate.  Pulmonary/Chest: Respirations are even and non-labored bilaterally.  Abdominal: Soft. No distension, tenderness, masses or guarding.  Back: + left CVA tenderness,  - right CVA tenderness.   Extremities: ADELA x 4, Warm. No clubbing.  No cyanosis.    Skin: Pink, warm and dry.  No rashes noted.    Labs  Results for orders placed or performed in visit on 04/26/19   CT Abdomen Pelvis w/o Contrast    Narrative    EXAM:    CT Abdomen and Pelvis Without Contrast     EXAM DATE/TIME:    4/26/2019 7:04 PM     CLINICAL HISTORY:    23 years old, male; Unspecified abdominal pain; Left lower quadrant; Patient   HX: Patient has a history of kidney stones. Patients pain is more on the   lateral side; Additional info: Abd pain, unspecified     TECHNIQUE:    Imaging protocol: Axial computed tomography images of the abdomen and pelvis   without contrast. Coronal and sagittal reformatted images were created and   reviewed.    Radiation optimization: All CT scans at this facility use at least one of   these dose optimization techniques: automated exposure control; mA and/or kV   adjustment per patient size (includes targeted exams where dose is matched to   clinical indication); or iterative reconstruction.     COMPARISON:    CT ABDOMEN PELVIS W/O CONTRAST 3/17/2019 12:53 PM     FINDINGS:     ABDOMEN:    Liver: Mild diffuse hepatic steatosis.    Gallbladder and bile ducts: Normal. No calcified stones. No ductal dilation.    Pancreas: Normal. No ductal dilation.    Spleen: Normal. No splenomegaly.    Adrenals: Normal. No mass.    Kidneys and ureters: Stable mild fullness of the renal collecting system. 1 mm   nonobstructing left lower pole renal calculus.    Stomach and bowel: Normal. No obstruction. No mucosal thickening.    Appendix: A normal appendix.     PELVIS:    Bladder: Unremarkable as visualized.    Reproductive: Unremarkable as visualized.     ABDOMEN and PELVIS:    Intraperitoneal space:  Normal. No free air. No significant fluid collection.    Bones/joints: No acute fracture. No dislocation.    Soft tissues: Unremarkable.    Vasculature: Normal. No abdominal aortic aneurysm.    Lymph nodes: Normal. No enlarged lymph nodes.       Impression    IMPRESSION:   1. No acute osseous pelvic CT findings.   2. Stable mild fullness of the right renal collecting system. Is there chronic   in nature.   3. 1 mm nonobstructing left renal calculus.     THIS DOCUMENT HAS BEEN ELECTRONICALLY SIGNED BY ERLIN JAMISON MD   Comprehensive metabolic panel   Result Value Ref Range    Sodium 137 134 - 144 mmol/L    Potassium 3.5 3.5 - 5.1 mmol/L    Chloride 100 98 - 107 mmol/L    Carbon Dioxide 27 21 - 31 mmol/L    Anion Gap 10 3 - 14 mmol/L    Glucose 140 (H) 70 - 105 mg/dL    Urea Nitrogen 14 7 - 25 mg/dL    Creatinine 1.00 0.70 - 1.30 mg/dL    GFR Estimate >90 >60 mL/min/[1.73_m2]    GFR Estimate If Black >90 >60 mL/min/[1.73_m2]    Calcium 9.5 8.6 - 10.3 mg/dL    Bilirubin Total 0.4 0.3 - 1.0 mg/dL    Albumin 4.7 3.5 - 5.7 g/dL    Protein Total 7.2 6.4 - 8.9 g/dL    Alkaline Phosphatase 98 34 - 104 U/L    ALT 51 7 - 52 U/L    AST 20 13 - 39 U/L   CBC with platelets differential   Result Value Ref Range    WBC 11.2 (H) 4.0 - 11.0 10e9/L    RBC Count 5.66 4.4 - 5.9 10e12/L    Hemoglobin 16.5 13.3 - 17.7 g/dL    Hematocrit 48.7 40.0 - 53.0 %    MCV 86 78 - 100 fl    MCH 29.2 26.5 - 33.0 pg    MCHC 33.9 31.5 - 36.5 g/dL    RDW 13.2 10.0 - 15.0 %    Platelet Count 243 150 - 450 10e9/L    Diff Method Automated Method     % Neutrophils 60.3 %    % Lymphocytes 29.4 %    % Monocytes 6.5 %    % Eosinophils 1.3 %    % Basophils 0.5 %    % Immature Granulocytes 2.0 %    Absolute Neutrophil 6.7 1.6 - 8.3 10e9/L    Absolute Lymphocytes 3.3 0.8 - 5.3 10e9/L    Absolute Monocytes 0.7 0.0 - 1.3 10e9/L    Absolute Eosinophils 0.2 0.0 - 0.7 10e9/L    Absolute Basophils 0.1 0.0 - 0.2 10e9/L    Abs Immature Granulocytes 0.2 0 - 0.4 10e9/L    UA reflex to Microscopic and Culture   Result Value Ref Range    Color Urine Yellow     Appearance Urine Clear     Glucose Urine Negative NEG^Negative mg/dL    Bilirubin Urine Negative NEG^Negative    Ketones Urine Negative NEG^Negative mg/dL    Specific Gravity Urine 1.010 1.000 - 1.030    Blood Urine Negative NEG^Negative    pH Urine 6.5 5.0 - 9.0 pH    Protein Albumin Urine Negative NEG^Negative mg/dL    Urobilinogen Urine 0.2 0.2 - 1.0 EU/dL    Nitrite Urine Negative NEG^Negative    Leukocyte Esterase Urine Negative NEG^Negative    Source Midstream Urine      Results for TAMMY WALKER (MRN [id number]) as of 4/29/2019 13:55   3/25/2019 08:46   Sodium 138   Potassium 3.8   Chloride 103   Carbon Dioxide 26   Urea Nitrogen 17   Creatinine 1.25   GFR Estimate 72   GFR Estimate If Black 87   Calcium 9.7   Anion Gap 9   Magnesium 1.9   Phosphorus 1.8 (L)   Glucose 103   Parathyroid Hormone Intact 16     Stone Analysis  3/19/2019  20% calcium oxalate (monohydrate and dihydrate) and   80% calcium phosphate (hydroxy- and carbonate- apatite)    Radiographic Studies  I personally reviewed and interpreted the images and report.  CT Stone  4/26/2019  IMPRESSION:   1. No acute osseous pelvic CT findings.   2. Stable mild fullness of the right renal collecting system. Is there chronic in nature.   3. 1 mm nonobstructing left renal calculus.     Assessment & Plan  Mr. Walker is a 23 year old male with history of kidney stones.  Reviewed the recent labs and imaging.  His imaging looks excellent and the only stone material on the left side is a small 1 mm collection of dust.  I explained that this is not a cause for pain.  I explained that given the pain is related to position it may be of back/musculoskeletal origin.  Discussed dietary and fluid management approach to decrease risk of future stone events- hand out provided  We will plan to review the Litholink when he follows up at the next visit

## 2019-04-29 NOTE — NURSING NOTE
Pt presents to clinic for follow up on new kidney stone    Review of Systems:    Weight loss:    No     Recent fever/chills:  No   Night sweats:   No  Current skin rash:  No   Recent hair loss:  No  Heat intolerance:  No   Cold intolerance:  No  Chest pain:   No   Palpitations:   No  Shortness of breath:  No   Wheezing:   No  Constipation:    No   Diarrhea:   No   Nausea:   No   Vomiting:   No   Kidney/side pain:  Yes   Back pain:   No  Frequent headaches:  No   Dizziness:     No  Leg swelling:   No   Calf pain:    No

## 2019-05-15 ENCOUNTER — OFFICE VISIT (OUTPATIENT)
Dept: UROLOGY | Facility: OTHER | Age: 24
End: 2019-05-15
Attending: UROLOGY
Payer: COMMERCIAL

## 2019-05-15 VITALS
SYSTOLIC BLOOD PRESSURE: 128 MMHG | DIASTOLIC BLOOD PRESSURE: 80 MMHG | WEIGHT: 218.4 LBS | BODY MASS INDEX: 33.21 KG/M2 | RESPIRATION RATE: 16 BRPM | HEART RATE: 76 BPM

## 2019-05-15 DIAGNOSIS — N20.0 KIDNEY STONES: ICD-10-CM

## 2019-05-15 DIAGNOSIS — R82.991 HYPOCITRATURIA: Primary | ICD-10-CM

## 2019-05-15 PROCEDURE — 99214 OFFICE O/P EST MOD 30 MIN: CPT | Performed by: UROLOGY

## 2019-05-15 RX ORDER — POTASSIUM CITRATE 10 MEQ/1
20 TABLET, EXTENDED RELEASE ORAL 2 TIMES DAILY WITH MEALS
Qty: 360 TABLET | Refills: 3 | Status: SHIPPED | OUTPATIENT
Start: 2019-05-15 | End: 2019-06-20

## 2019-05-15 ASSESSMENT — PAIN SCALES - GENERAL: PAINLEVEL: NO PAIN (0)

## 2019-05-15 NOTE — PROGRESS NOTES
Type of Visit  Established    Chief Complaint  History of kidney stones    HPI  Mr. Kinney is a 23 year old male with history of kidney stones.  Patient has been experiencing stone issue for over 15 years.  In the past he has spontaneously passed symptomatic stones.  Recently he required surgery however.  The patient underwent Left URS on 3/2019.  In addition he had a large stone lodged at his meatus which was also treated.  Given the stone burden and his family history of kidney stones I had recommended a work-up as I suspect he will have long-term issues with recurrent kidney stones.  He recently underwent a 24-hour urine Litholink and also previously underwent serum testing for stone work-up.  He denies renal colic.  He denies hematuria and dysuria.    Current Prevention Strategies  Medical management for stone prevention includes the following: None  Dietary management for stone prevention includes the following: None      Review of Systems  I reviewed the ROS with the patient.    Nursing Notes:   Paula Sewell LPN  5/15/2019  9:45 AM  Signed  Chief Complaint   Patient presents with     Follow Up     Litholink results   Patient presents to the clinic today for Litho link Results    Review of Systems:    Weight loss:    No     Recent fever/chills:  No   Night sweats:   No  Current skin rash:  No   Recent hair loss:  No  Heat intolerance:  No   Cold intolerance:  No  Chest pain:   No   Palpitations:   No  Shortness of breath:  No   Wheezing:   No  Constipation:    No   Diarrhea:   No   Nausea:   No   Vomiting:   No   Kidney/side pain:  No   Back pain:   No  Frequent headaches:  No   Dizziness:     No  Leg swelling:   No   Calf pain:    No          Medication Reconciliation: completed   Paula Sewell LPN  5/15/2019 9:26 AM     Family History  No family history on file.    Physical Exam  /80 (BP Location: Right arm, Patient Position: Sitting, Cuff Size: Adult Regular)   Pulse 76   Resp 16   Wt 99.1 kg (218  lb 6.4 oz)   BMI 33.21 kg/m    Constitutional: NAD, WDWN.  Cardiovascular: Regular rate.  Pulmonary/Chest: Respirations are even and non-labored bilaterally.  Abdominal: Soft. No distension, tenderness, masses or guarding.  Back: - left CVA tenderness,  - right CVA tenderness.   Extremities: ADELA x 4, Warm. No clubbing.  No cyanosis.    Skin: Pink, warm and dry.  No rashes noted.    Labs  Results for orders placed or performed in visit on 04/26/19   CT Abdomen Pelvis w/o Contrast    Narrative    EXAM:    CT Abdomen and Pelvis Without Contrast     EXAM DATE/TIME:    4/26/2019 7:04 PM     CLINICAL HISTORY:    23 years old, male; Unspecified abdominal pain; Left lower quadrant; Patient   HX: Patient has a history of kidney stones. Patients pain is more on the   lateral side; Additional info: Abd pain, unspecified     TECHNIQUE:    Imaging protocol: Axial computed tomography images of the abdomen and pelvis   without contrast. Coronal and sagittal reformatted images were created and   reviewed.    Radiation optimization: All CT scans at this facility use at least one of   these dose optimization techniques: automated exposure control; mA and/or kV   adjustment per patient size (includes targeted exams where dose is matched to   clinical indication); or iterative reconstruction.     COMPARISON:    CT ABDOMEN PELVIS W/O CONTRAST 3/17/2019 12:53 PM     FINDINGS:     ABDOMEN:    Liver: Mild diffuse hepatic steatosis.    Gallbladder and bile ducts: Normal. No calcified stones. No ductal dilation.    Pancreas: Normal. No ductal dilation.    Spleen: Normal. No splenomegaly.    Adrenals: Normal. No mass.    Kidneys and ureters: Stable mild fullness of the renal collecting system. 1 mm   nonobstructing left lower pole renal calculus.    Stomach and bowel: Normal. No obstruction. No mucosal thickening.    Appendix: A normal appendix.     PELVIS:    Bladder: Unremarkable as visualized.    Reproductive: Unremarkable as visualized.      ABDOMEN and PELVIS:    Intraperitoneal space: Normal. No free air. No significant fluid collection.    Bones/joints: No acute fracture. No dislocation.    Soft tissues: Unremarkable.    Vasculature: Normal. No abdominal aortic aneurysm.    Lymph nodes: Normal. No enlarged lymph nodes.       Impression    IMPRESSION:   1. No acute osseous pelvic CT findings.   2. Stable mild fullness of the right renal collecting system. Is there chronic   in nature.   3. 1 mm nonobstructing left renal calculus.     THIS DOCUMENT HAS BEEN ELECTRONICALLY SIGNED BY ERLIN JAMISON MD   Comprehensive metabolic panel   Result Value Ref Range    Sodium 137 134 - 144 mmol/L    Potassium 3.5 3.5 - 5.1 mmol/L    Chloride 100 98 - 107 mmol/L    Carbon Dioxide 27 21 - 31 mmol/L    Anion Gap 10 3 - 14 mmol/L    Glucose 140 (H) 70 - 105 mg/dL    Urea Nitrogen 14 7 - 25 mg/dL    Creatinine 1.00 0.70 - 1.30 mg/dL    GFR Estimate >90 >60 mL/min/[1.73_m2]    GFR Estimate If Black >90 >60 mL/min/[1.73_m2]    Calcium 9.5 8.6 - 10.3 mg/dL    Bilirubin Total 0.4 0.3 - 1.0 mg/dL    Albumin 4.7 3.5 - 5.7 g/dL    Protein Total 7.2 6.4 - 8.9 g/dL    Alkaline Phosphatase 98 34 - 104 U/L    ALT 51 7 - 52 U/L    AST 20 13 - 39 U/L   CBC with platelets differential   Result Value Ref Range    WBC 11.2 (H) 4.0 - 11.0 10e9/L    RBC Count 5.66 4.4 - 5.9 10e12/L    Hemoglobin 16.5 13.3 - 17.7 g/dL    Hematocrit 48.7 40.0 - 53.0 %    MCV 86 78 - 100 fl    MCH 29.2 26.5 - 33.0 pg    MCHC 33.9 31.5 - 36.5 g/dL    RDW 13.2 10.0 - 15.0 %    Platelet Count 243 150 - 450 10e9/L    Diff Method Automated Method     % Neutrophils 60.3 %    % Lymphocytes 29.4 %    % Monocytes 6.5 %    % Eosinophils 1.3 %    % Basophils 0.5 %    % Immature Granulocytes 2.0 %    Absolute Neutrophil 6.7 1.6 - 8.3 10e9/L    Absolute Lymphocytes 3.3 0.8 - 5.3 10e9/L    Absolute Monocytes 0.7 0.0 - 1.3 10e9/L    Absolute Eosinophils 0.2 0.0 - 0.7 10e9/L    Absolute Basophils 0.1 0.0 - 0.2 10e9/L     Abs Immature Granulocytes 0.2 0 - 0.4 10e9/L   UA reflex to Microscopic and Culture   Result Value Ref Range    Color Urine Yellow     Appearance Urine Clear     Glucose Urine Negative NEG^Negative mg/dL    Bilirubin Urine Negative NEG^Negative    Ketones Urine Negative NEG^Negative mg/dL    Specific Gravity Urine 1.010 1.000 - 1.030    Blood Urine Negative NEG^Negative    pH Urine 6.5 5.0 - 9.0 pH    Protein Albumin Urine Negative NEG^Negative mg/dL    Urobilinogen Urine 0.2 0.2 - 1.0 EU/dL    Nitrite Urine Negative NEG^Negative    Leukocyte Esterase Urine Negative NEG^Negative    Source Midstream Urine      Results for TAMMY WALKER (MRN [id number]) as of 5/15/2019 09:47   3/25/2019 08:46   Sodium 138   Potassium 3.8   Chloride 103   Carbon Dioxide 26   Urea Nitrogen 17   Creatinine 1.25   GFR Estimate 72   GFR Estimate If Black 87   Calcium 9.7   Anion Gap 9   Magnesium 1.9   Phosphorus 1.8 (L)   Glucose 103   Parathyroid Hormone Intact 16     Stone Analysis  3/17/2019  20% calcium oxalate  80% calcium phosphate     Litholink     4/28/2019 & 4/29/2019  Volume (L)  2.1-4.5   SS CaOx  2.27-5.4 (6-10)  Urine Calcium  203-213 (male<250)  Urine Oxalate  32-34  (20-40)  Urine Citrate  238-362 (male>450)  SS CaP  0.8-1.4  (0.5-2)  24 hr Urine pH  6.4-6.8  (5.8-6.2)  SS Uric Acid  0.07-0.31 (0-1)  Urine uric acid  0.76-0.777 (<0.8)    (collected while on treatment:  None)    Na: 107-206 and Cl:     Radiographic Studies  I personally reviewed and interpreted the images and report.  CT Stone  4/26/2019  IMPRESSION:   1. No acute osseous pelvic CT findings.   2. Stable mild fullness of the right renal collecting system. Is there chronic in nature.   3. 1 mm nonobstructing left renal calculus.    Assessment  Mr. Walker is a 23 year old male with history of kidney stones with hypocitraturia.  Clinically it appears the patient may have distal renal tubular acidosis.  I described the pathophysiology of this disease  state and explained how it leads to recurrent stones.    I congratulated the patient on having excellent urine output, low urine calcium, low dietary salt and relatively low oxalate,    Plan  Discussed dietary and fluid management approach to decrease risk of future stone events- hand out provided  KCitrate 20 mEq BID  Follow up in 2-3 weeks with BMP          I spent 25 minutes on this patient's visit and over half of this time was spent in face-to-face discussion regarding kidney stones, imaging results, prevention of stones with fluids and dietary changes, indications for metabolic work up, prognosis and importance of compliance.

## 2019-05-15 NOTE — NURSING NOTE
Chief Complaint   Patient presents with     Follow Up     Litholink results   Patient presents to the clinic today for Litho link Results    Review of Systems:    Weight loss:    No     Recent fever/chills:  No   Night sweats:   No  Current skin rash:  No   Recent hair loss:  No  Heat intolerance:  No   Cold intolerance:  No  Chest pain:   No   Palpitations:   No  Shortness of breath:  No   Wheezing:   No  Constipation:    No   Diarrhea:   No   Nausea:   No   Vomiting:   No   Kidney/side pain:  No   Back pain:   No  Frequent headaches:  No   Dizziness:     No  Leg swelling:   No   Calf pain:    No          Medication Reconciliation: completed   Paula Sewell LPN  5/15/2019 9:26 AM

## 2019-06-20 ENCOUNTER — OFFICE VISIT (OUTPATIENT)
Dept: FAMILY MEDICINE | Facility: OTHER | Age: 24
End: 2019-06-20
Attending: NURSE PRACTITIONER
Payer: COMMERCIAL

## 2019-06-20 ENCOUNTER — OFFICE VISIT (OUTPATIENT)
Dept: UROLOGY | Facility: OTHER | Age: 24
End: 2019-06-20
Attending: UROLOGY
Payer: COMMERCIAL

## 2019-06-20 VITALS
HEART RATE: 76 BPM | WEIGHT: 211.8 LBS | RESPIRATION RATE: 16 BRPM | BODY MASS INDEX: 32.2 KG/M2 | TEMPERATURE: 98.1 F | SYSTOLIC BLOOD PRESSURE: 118 MMHG | DIASTOLIC BLOOD PRESSURE: 78 MMHG | OXYGEN SATURATION: 97 %

## 2019-06-20 VITALS
WEIGHT: 209.2 LBS | DIASTOLIC BLOOD PRESSURE: 78 MMHG | SYSTOLIC BLOOD PRESSURE: 118 MMHG | HEART RATE: 76 BPM | RESPIRATION RATE: 12 BRPM | BODY MASS INDEX: 31.81 KG/M2

## 2019-06-20 DIAGNOSIS — R82.991 HYPOCITRATURIA: ICD-10-CM

## 2019-06-20 DIAGNOSIS — F41.9 ANXIETY: ICD-10-CM

## 2019-06-20 DIAGNOSIS — R10.13 EPIGASTRIC PAIN: Primary | ICD-10-CM

## 2019-06-20 DIAGNOSIS — N20.0 KIDNEY STONES: Primary | ICD-10-CM

## 2019-06-20 LAB
ANION GAP SERPL CALCULATED.3IONS-SCNC: 9 MMOL/L (ref 3–14)
BUN SERPL-MCNC: 14 MG/DL (ref 7–25)
CALCIUM SERPL-MCNC: 9.7 MG/DL (ref 8.6–10.3)
CHLORIDE SERPL-SCNC: 105 MMOL/L (ref 98–107)
CO2 SERPL-SCNC: 26 MMOL/L (ref 21–31)
CREAT SERPL-MCNC: 1 MG/DL (ref 0.7–1.3)
GFR SERPL CREATININE-BSD FRML MDRD: >90 ML/MIN/{1.73_M2}
GLUCOSE SERPL-MCNC: 99 MG/DL (ref 70–105)
POTASSIUM SERPL-SCNC: 4.1 MMOL/L (ref 3.5–5.1)
SODIUM SERPL-SCNC: 140 MMOL/L (ref 134–144)

## 2019-06-20 PROCEDURE — 36415 COLL VENOUS BLD VENIPUNCTURE: CPT | Performed by: UROLOGY

## 2019-06-20 PROCEDURE — 99213 OFFICE O/P EST LOW 20 MIN: CPT | Performed by: UROLOGY

## 2019-06-20 PROCEDURE — 80048 BASIC METABOLIC PNL TOTAL CA: CPT | Performed by: UROLOGY

## 2019-06-20 PROCEDURE — 99214 OFFICE O/P EST MOD 30 MIN: CPT | Performed by: NURSE PRACTITIONER

## 2019-06-20 RX ORDER — HYDROXYZINE HYDROCHLORIDE 25 MG/1
25 TABLET, FILM COATED ORAL 3 TIMES DAILY PRN
Qty: 60 TABLET | Refills: 0 | Status: SHIPPED | OUTPATIENT
Start: 2019-06-20 | End: 2021-04-15

## 2019-06-20 RX ORDER — SUCRALFATE 1 G/1
1 TABLET ORAL 4 TIMES DAILY
Qty: 56 TABLET | Refills: 0 | Status: SHIPPED | OUTPATIENT
Start: 2019-06-20 | End: 2019-11-02

## 2019-06-20 ASSESSMENT — PATIENT HEALTH QUESTIONNAIRE - PHQ9: 5. POOR APPETITE OR OVEREATING: NOT AT ALL

## 2019-06-20 ASSESSMENT — ANXIETY QUESTIONNAIRES
7. FEELING AFRAID AS IF SOMETHING AWFUL MIGHT HAPPEN: NOT AT ALL
5. BEING SO RESTLESS THAT IT IS HARD TO SIT STILL: MORE THAN HALF THE DAYS
IF YOU CHECKED OFF ANY PROBLEMS ON THIS QUESTIONNAIRE, HOW DIFFICULT HAVE THESE PROBLEMS MADE IT FOR YOU TO DO YOUR WORK, TAKE CARE OF THINGS AT HOME, OR GET ALONG WITH OTHER PEOPLE: VERY DIFFICULT
3. WORRYING TOO MUCH ABOUT DIFFERENT THINGS: NEARLY EVERY DAY
6. BECOMING EASILY ANNOYED OR IRRITABLE: NEARLY EVERY DAY
1. FEELING NERVOUS, ANXIOUS, OR ON EDGE: MORE THAN HALF THE DAYS
GAD7 TOTAL SCORE: 12
2. NOT BEING ABLE TO STOP OR CONTROL WORRYING: MORE THAN HALF THE DAYS

## 2019-06-20 ASSESSMENT — PAIN SCALES - GENERAL
PAINLEVEL: NO PAIN (0)
PAINLEVEL: NO PAIN (0)

## 2019-06-20 NOTE — NURSING NOTE
Review of Systems:    Weight loss:    No     Recent fever/chills:  No   Night sweats:   No  Current skin rash:  No   Recent hair loss:  No  Heat intolerance:  No   Cold intolerance:  No  Chest pain:   No   Palpitations:   No  Shortness of breath:  No   Wheezing:   No  Constipation:    No   Diarrhea:   No   Nausea:   No   Vomiting:   No   Kidney/side pain:  No   Back pain:   No  Frequent headaches:  No   Dizziness:     No  Leg swelling:   No   Calf pain:    No

## 2019-06-20 NOTE — PROGRESS NOTES
HPI:    Hi Kinney is a 24 year old male who presents to clinic today for concerns regarding anxiety.  He reports having concerns with severe anxiety for the past 2 months.  He feels like he just cannot shut off his mind at times.  He reports he has had a history of anxiety for the past year but has gotten worse over the last 2 months.  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  He also is a coordinator of the DOC program at [employer] which is stressful as well.  Here with his symptoms include butterflies in the stomach as well as unable to shut his mind off.  He will ruminate on thoughts frequently.  He does do routine exercise through work.  He enjoys to exercise, hunt and fish.  He reports he is sleeping very good at night.  He is following a fairly healthy diet.  He reports his mom and sister both anxiety and there is depression in his mom side.  His mom does take medications for anxiety currently but he is not sure with his medications are.  He has had some health issues that is increasing anxiety as well.  He denies any concerns regarding depression, self-harm suicidal ideas    He also reports some increased stomach issues.  He was on potassium citrate for recurrent kidney stones for about 2 weeks.  This caused some GI upset.  He is increased epigastric pain when he is eating and drinking.  Since medication was stopped about 3 weeks ago his symptoms have gotten a little bit better but continues with occasional nausea.  Denies any vomiting denies any dark black stools.  Is not taking any over-the-counter medications for symptomatic management.  He has not any fevers.    History reviewed. No pertinent past medical history.    Past Surgical History:   Procedure Laterality Date     COMBINED CYSTOSCOPY, URETEROSCOPY, LASER HOLMIUM LITHOTRIPSY URETER(S) Left 3/17/2019    Procedure: COMBINED CYSTOSCOPY, URETEROSCOPY, LASER HOLMIUM  LITHOTRIPSY URETER(S)and stent placement;  Surgeon: Veto Peralta MD;  Location: [facility] OR     OTHER SURGICAL HISTORY      [id number],SKIN GRAFT,Right,calf     TONSILLECTOMY      No Comments Provided       History reviewed. No pertinent family history.    Social History     Socioeconomic History     Marital status: Single     Spouse name: Not on file     Number of children: Not on file     Years of education: Not on file     Highest education level: Not on file   Occupational History     Not on file   Social Needs     Financial resource strain: Not on file     Food insecurity:     Worry: Not on file     Inability: Not on file     Transportation needs:     Medical: Not on file     Non-medical: Not on file   Tobacco Use     Smoking status: Passive Smoke Exposure - Never Smoker     Smokeless tobacco: Never Used     Tobacco comment: Quit smoking: family members smoke   Substance and Sexual Activity     Alcohol use: Yes     Comment: rare     Drug use: No     Sexual activity: Yes   Lifestyle     Physical activity:     Days per week: Not on file     Minutes per session: Not on file     Stress: Not on file   Relationships     Social connections:     Talks on phone: Not on file     Gets together: Not on file     Attends Yazdanism service: Not on file     Active member of club or organization: Not on file     Attends meetings of clubs or organizations: Not on file     Relationship status: Not on file     Intimate partner violence:     Fear of current or ex partner: Not on file     Emotionally abused: Not on file     Physically abused: Not on file     Forced sexual activity: Not on file   Other Topics Concern     Parent/sibling w/ CABG, MI or angioplasty before 65F 55M? Not Asked   Social History Narrative    Lives with both parents, has a younger sister.    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       Current Outpatient Medications   Medication Sig Dispense Refill     FLUoxetine  (PROZAC) 20 MG capsule Take 1 capsule (20 mg) by mouth daily 30 capsule 3     hydrOXYzine (ATARAX) 25 MG tablet Take 1 tablet (25 mg) by mouth 3 times daily as needed for itching 60 tablet 0     ibuprofen (ADVIL/MOTRIN) 800 MG tablet Take 1 tablet (800 mg) by mouth every 8 hours as needed for moderate pain 30 tablet 0     omeprazole (PRILOSEC) 20 MG DR capsule Take 1 capsule (20 mg) by mouth daily 30 capsule 3     sucralfate (CARAFATE) 1 GM tablet Take 1 tablet (1 g) by mouth 4 times daily for 14 days 56 tablet 0       Allergies   Allergen Reactions     Cefprozil      Other reaction(s): *Unknown - Childhood Rxn     Augmentin Rash     Childhood reaction       ROS:  Pertinent positives and negatives are noted in HPI.    EXAM:  /78 (BP Location: Right arm, Patient Position: Sitting, Cuff Size: Adult Regular)   Pulse 76   Temp 98.1  F (36.7  C) (Tympanic)   Resp 16   Wt 96.1 kg (211 lb 12.8 oz)   SpO2 97%   BMI 32.20 kg/m    General appearance: well appearing male, in no acute distress  Respiratory: clear to auscultation bilaterally  Cardiac: RRR with no murmurs  Abdomen: soft, epigastric tenderness, no masses or organomegally  Psychological: normal affect, alert and pleasant    PHQ Depression Screen  No flowsheet data found.  NATI-7 SCORE 6/20/2019   Total Score 12         ASSESSMENT AND PLAN:    1. Epigastric pain    2. Anxiety      Epigastric pain likely related to recent potassium citrate as well as stress.  At this time we will have him start omeprazole daily and Carafate 4 times daily for 2 weeks.  If his symptoms worsen or do not improve we will follow-up with further testing and imaging.  In regards to his anxiety I would like to start him on fluoxetine 20 mill grams daily.  He was also provided hydroxyzine to take 3 times daily as needed to help him with sleep or settling down his ruminating thoughts.  He has had some lab work done recently including a parathyroid level.  Plan to follow-up with him  in 1 month and will consider further testing at that time.  We did discuss self-care including sleep hygiene, healthy diet, exercise.  I would also recommend that he consider some therapy for cognitive behavioral therapy given her his anxiety. Discussed side effects which include but are not limited to headache, stomachache, sleep disturbance, appetite suppression, emotional lability. Also discussed black box warning on all SSRI medication for increased thoughts of suicidality or self harm in the initial phase of treatment. If any thoughts of self harm/suicide, family is asked to seek medical care or call 911 or First Call for Help/Crisis Team for evaluation.  Follow up for any new or concerning side effects or any other questions.         Vero García..................6/20/2019 1:41 PM      This document was prepared using voice generated software.  While every attempt was made for accuracy, grammatical errors may exist.

## 2019-06-20 NOTE — PROGRESS NOTES
Type of Visit  Established    Chief Complaint  History of kidney stones  Hypocitraturia    HPI  Mr. Kinney is a 24 year old male with history of kidney stones for the last 15 years.  The patient underwent Left URS on 3/2019.  In addition he had a large stone lodged at his meatus which was also treated.  Given the stone burden and his family history of kidney stones I had recommended a work-up as I suspect he will have long-term issues with recurrent kidney stones.  The work-up revealed one primar risk factor: Hypocitraturia.  Because of this about 3 weeks ago he started potassium citrate 20 mEq twice daily.  He did not tolerate the medication and had to discontinue due to severe GI issues.  He underwent BMP and follows up today to discuss the labs and plans going forward.    Current Prevention Strategies  Medical management for stone prevention includes the following: KCitrate 20 mEq BID  Dietary management for stone prevention includes the following: None      Review of Systems  I reviewed the ROS with the patient.    Nursing Notes:   Yesi Martínez RN  6/20/2019  9:36 AM  Signed  Review of Systems:    Weight loss:    No     Recent fever/chills:  No   Night sweats:   No  Current skin rash:  No   Recent hair loss:  No  Heat intolerance:  No   Cold intolerance:  No  Chest pain:   No   Palpitations:   No  Shortness of breath:  No   Wheezing:   No  Constipation:    No   Diarrhea:   No   Nausea:   No   Vomiting:   No   Kidney/side pain:  No   Back pain:   No  Frequent headaches:  No   Dizziness:     No  Leg swelling:   No   Calf pain:    No      Physical Exam  /78 (BP Location: Left arm, Patient Position: Sitting, Cuff Size: Adult Regular)   Pulse 76   Resp 12   Wt 94.9 kg (209 lb 3.2 oz)   BMI 31.81 kg/m    Constitutional: NAD, WDWN.  Cardiovascular: Regular rate.  Pulmonary/Chest: Respirations are even and non-labored bilaterally.  Abdominal: Soft. No distension, tenderness, masses or guarding.  Back: -  left CVA tenderness,  - right CVA tenderness.   Extremities: ADELA x 4, Warm. No clubbing.  No cyanosis.    Skin: Pink, warm and dry.  No rashes noted.    Labs  Results for orders placed or performed in visit on 06/20/19   Basic metabolic panel   Result Value Ref Range    Sodium 140 134 - 144 mmol/L    Potassium 4.1 3.5 - 5.1 mmol/L    Chloride 105 98 - 107 mmol/L    Carbon Dioxide 26 21 - 31 mmol/L    Anion Gap 9 3 - 14 mmol/L    Glucose 99 70 - 105 mg/dL    Urea Nitrogen 14 7 - 25 mg/dL    Creatinine 1.00 0.70 - 1.30 mg/dL    GFR Estimate >90 >60 mL/min/[1.73_m2]    GFR Estimate If Black >90 >60 mL/min/[1.73_m2]    Calcium 9.7 8.6 - 10.3 mg/dL     Results for TAMMY WALKER (MRN [id number]) as of 5/15/2019 09:47   3/25/2019 08:46   Magnesium 1.9   Phosphorus 1.8 (L)   Parathyroid Hormone Intact 16     Stone Analysis  3/17/2019  20% calcium oxalate  80% calcium phosphate     [facility]     4/28/2019 & 4/29/2019  Volume (L)  2.1-4.5   SS CaOx  2.27-5.4 (6-10)  Urine Calcium  203-213 (male<250)  Urine Oxalate  32-34  (20-40)  Urine Citrate  238-362 (male>450)  SS CaP  0.8-1.4  (0.5-2)  24 hr Urine pH  6.4-6.8  (5.8-6.2)  SS Uric Acid  0.07-0.31 (0-1)  Urine uric acid  0.76-0.777 (<0.8)    (collected while on treatment:  None)    Na: 107-206 and Cl:     Radiographic Studies  I personally reviewed and interpreted the images and report.  CT Stone  4/26/2019  IMPRESSION:   1. No acute osseous pelvic CT findings.   2. Stable mild fullness of the right renal collecting system. Is there chronic in nature.   3. 1 mm nonobstructing left renal calculus.    Assessment  Mr. Walker is a 24 year old male with history of kidney stones with hypocitraturia who recently started medical management with potassium citrate who follows up with a BMP.  Unfortunately he did not tolerate potassium citrate so we need to discontinue the medication.    Clinically it appears the patient may have distal renal tubular acidosis.  I described the  pathophysiology of this disease state and explained how it leads to recurrent stones.    Plan  Discontinue KCitrate 20 mEq BID  Follow up in 1 year with CT stone study prior

## 2019-06-20 NOTE — PATIENT INSTRUCTIONS
Fluoxetine daily  Hydroxyzine 3 times daily as needed  Consider therapy-cognitive behavioral therapy    Carafate 4 times daily before meals for 14 days  Omeprazole daily    Follow up in 1 month

## 2019-06-20 NOTE — NURSING NOTE
Chief Complaint   Patient presents with     Anxiety     Severe anxiety the last 2 months. No physical symptoms, just cannot shut his mind off sometimes. Was seen today by Dr. Peralta due to chronic kidney stones. The med Fabian prescribed gave pt severe stomach aches. Wondering if with the combo of anxiety and med that an ulcer has developed.     Medication Reconciliation: complete    Gabriella Hobson, LPN

## 2019-06-21 ASSESSMENT — ANXIETY QUESTIONNAIRES: GAD7 TOTAL SCORE: 12

## 2019-06-22 ENCOUNTER — HOSPITAL ENCOUNTER (EMERGENCY)
Facility: OTHER | Age: 24
Discharge: HOME | End: 2019-06-22
Attending: PHYSICIAN ASSISTANT | Admitting: PHYSICIAN ASSISTANT
Payer: COMMERCIAL

## 2019-06-22 ENCOUNTER — APPOINTMENT (OUTPATIENT)
Dept: ULTRASOUND IMAGING | Facility: OTHER | Age: 24
End: 2019-06-22
Attending: PHYSICIAN ASSISTANT
Payer: COMMERCIAL

## 2019-06-22 VITALS
HEART RATE: 112 BPM | TEMPERATURE: 98.2 F | BODY MASS INDEX: 32.28 KG/M2 | SYSTOLIC BLOOD PRESSURE: 129 MMHG | RESPIRATION RATE: 16 BRPM | OXYGEN SATURATION: 98 % | HEIGHT: 68 IN | DIASTOLIC BLOOD PRESSURE: 78 MMHG | WEIGHT: 213 LBS

## 2019-06-22 DIAGNOSIS — R10.13 ABDOMINAL PAIN, EPIGASTRIC: ICD-10-CM

## 2019-06-22 LAB
ALBUMIN SERPL-MCNC: 4.7 G/DL (ref 3.5–5.7)
ALBUMIN UR-MCNC: NEGATIVE MG/DL
ALP SERPL-CCNC: 86 U/L (ref 34–104)
ALT SERPL W P-5'-P-CCNC: 40 U/L (ref 7–52)
ANION GAP SERPL CALCULATED.3IONS-SCNC: 9 MMOL/L (ref 3–14)
APPEARANCE UR: CLEAR
AST SERPL W P-5'-P-CCNC: 15 U/L (ref 13–39)
BACTERIA #/AREA URNS HPF: ABNORMAL /HPF
BASOPHILS # BLD AUTO: 0.1 10E9/L (ref 0–0.2)
BASOPHILS NFR BLD AUTO: 0.5 %
BILIRUB SERPL-MCNC: 1.2 MG/DL (ref 0.3–1)
BILIRUB UR QL STRIP: ABNORMAL
BUN SERPL-MCNC: 11 MG/DL (ref 7–25)
CALCIUM SERPL-MCNC: 9.8 MG/DL (ref 8.6–10.3)
CHLORIDE SERPL-SCNC: 104 MMOL/L (ref 98–107)
CO2 SERPL-SCNC: 24 MMOL/L (ref 21–31)
COLOR UR AUTO: YELLOW
CREAT SERPL-MCNC: 1.08 MG/DL (ref 0.7–1.3)
DIFFERENTIAL METHOD BLD: ABNORMAL
EOSINOPHIL # BLD AUTO: 0.1 10E9/L (ref 0–0.7)
EOSINOPHIL NFR BLD AUTO: 0.8 %
ERYTHROCYTE [DISTWIDTH] IN BLOOD BY AUTOMATED COUNT: 13.2 % (ref 10–15)
GFR SERPL CREATININE-BSD FRML MDRD: 84 ML/MIN/{1.73_M2}
GLUCOSE SERPL-MCNC: 141 MG/DL (ref 70–105)
GLUCOSE UR STRIP-MCNC: NEGATIVE MG/DL
HCT VFR BLD AUTO: 52.5 % (ref 40–53)
HGB BLD-MCNC: 18.1 G/DL (ref 13.3–17.7)
HGB UR QL STRIP: NEGATIVE
IMM GRANULOCYTES # BLD: 0.1 10E9/L (ref 0–0.4)
IMM GRANULOCYTES NFR BLD: 0.9 %
KETONES UR STRIP-MCNC: 15 MG/DL
LEUKOCYTE ESTERASE UR QL STRIP: NEGATIVE
LIPASE SERPL-CCNC: 5 U/L (ref 11–82)
LYMPHOCYTES # BLD AUTO: 2 10E9/L (ref 0.8–5.3)
LYMPHOCYTES NFR BLD AUTO: 19.1 %
MCH RBC QN AUTO: 29.5 PG (ref 26.5–33)
MCHC RBC AUTO-ENTMCNC: 34.5 G/DL (ref 31.5–36.5)
MCV RBC AUTO: 86 FL (ref 78–100)
MONOCYTES # BLD AUTO: 0.5 10E9/L (ref 0–1.3)
MONOCYTES NFR BLD AUTO: 5 %
MUCOUS THREADS #/AREA URNS LPF: PRESENT /LPF
NEUTROPHILS # BLD AUTO: 7.7 10E9/L (ref 1.6–8.3)
NEUTROPHILS NFR BLD AUTO: 73.7 %
NITRATE UR QL: NEGATIVE
NON-SQ EPI CELLS #/AREA URNS LPF: ABNORMAL /LPF
PH UR STRIP: 7 PH (ref 5–9)
PLATELET # BLD AUTO: 242 10E9/L (ref 150–450)
POTASSIUM SERPL-SCNC: 3.5 MMOL/L (ref 3.5–5.1)
PROT SERPL-MCNC: 7.2 G/DL (ref 6.4–8.9)
RBC # BLD AUTO: 6.13 10E12/L (ref 4.4–5.9)
RBC #/AREA URNS AUTO: ABNORMAL /HPF
SODIUM SERPL-SCNC: 137 MMOL/L (ref 134–144)
SOURCE: ABNORMAL
SP GR UR STRIP: <1.005 (ref 1–1.03)
UROBILINOGEN UR STRIP-ACNC: 0.2 EU/DL (ref 0.2–1)
WBC # BLD AUTO: 10.4 10E9/L (ref 4–11)
WBC #/AREA URNS AUTO: ABNORMAL /HPF

## 2019-06-22 PROCEDURE — 85025 COMPLETE CBC W/AUTO DIFF WBC: CPT | Performed by: EMERGENCY MEDICINE

## 2019-06-22 PROCEDURE — 81001 URINALYSIS AUTO W/SCOPE: CPT | Performed by: EMERGENCY MEDICINE

## 2019-06-22 PROCEDURE — 80053 COMPREHEN METABOLIC PANEL: CPT | Performed by: EMERGENCY MEDICINE

## 2019-06-22 PROCEDURE — 99283 EMERGENCY DEPT VISIT LOW MDM: CPT | Mod: Z6 | Performed by: PHYSICIAN ASSISTANT

## 2019-06-22 PROCEDURE — 99284 EMERGENCY DEPT VISIT MOD MDM: CPT | Mod: 25 | Performed by: PHYSICIAN ASSISTANT

## 2019-06-22 PROCEDURE — 76705 ECHO EXAM OF ABDOMEN: CPT

## 2019-06-22 PROCEDURE — 83690 ASSAY OF LIPASE: CPT | Performed by: EMERGENCY MEDICINE

## 2019-06-22 PROCEDURE — 36415 COLL VENOUS BLD VENIPUNCTURE: CPT | Performed by: EMERGENCY MEDICINE

## 2019-06-22 RX ORDER — FENTANYL CITRATE 50 UG/ML
50 INJECTION, SOLUTION INTRAMUSCULAR; INTRAVENOUS ONCE
Status: DISCONTINUED | OUTPATIENT
Start: 2019-06-22 | End: 2019-06-22 | Stop reason: HOSPADM

## 2019-06-22 ASSESSMENT — ENCOUNTER SYMPTOMS
BLOOD IN STOOL: 0
SHORTNESS OF BREATH: 0
FEVER: 0
VOMITING: 0
NAUSEA: 0
ABDOMINAL PAIN: 1
DYSURIA: 0

## 2019-06-22 ASSESSMENT — MIFFLIN-ST. JEOR: SCORE: 1930.66

## 2019-06-22 NOTE — LETTER
June 22, 2019      To Whom It May Concern:      Hi Kinney was seen in our Emergency Department today, 06/22/19.  I expect his condition to improve over the next 2 days.  He may return to work when improved.    Sincerely,        NOAH Hernandes

## 2019-06-22 NOTE — ED PROVIDER NOTES
History     Chief Complaint   Patient presents with     Abdominal Pain     HPI  Hi Kinney is a 24 year old male who presents to ED with complaint of abd pain. Pt has had episodes of kidney stones requiring surgery recently and was started on potassium citrate. Shortly thereafter he began to have epigastric tenderness that worsens with food intake. He says it rates as 4/10, located in epigastric region with slight radiation to LLQ. Currently treated with omeprazole and carafate. Denies dysuria, fevers, chest pain, sob. No prior abd surgeries.     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    Past Surgical History:    Past Surgical History:   Procedure Laterality Date     COMBINED CYSTOSCOPY, URETEROSCOPY, LASER HOLMIUM LITHOTRIPSY URETER(S) Left 3/17/2019    Procedure: COMBINED CYSTOSCOPY, URETEROSCOPY, LASER HOLMIUM LITHOTRIPSY URETER(S)and stent placement;  Surgeon: Veto Peralta MD;  Location: [facility] OR     OTHER SURGICAL HISTORY      [id number],SKIN GRAFT,Right,calf     TONSILLECTOMY      No Comments Provided       Family History:    No family history on file.    Social History:  Marital Status:  Single [1]  Social History     Tobacco Use     Smoking status: Passive Smoke Exposure - Never Smoker     Smokeless tobacco: Never Used     Tobacco comment: Quit smoking: family members smoke   Substance Use Topics     Alcohol use: Yes     Comment: rare     Drug use: No        Medications:      omeprazole (PRILOSEC) 20 MG DR capsule   ranitidine (ZANTAC) 150 MG tablet   sucralfate (CARAFATE) 1 GM tablet   FLUoxetine (PROZAC) 20 MG capsule   hydrOXYzine (ATARAX) 25 MG tablet  "  ibuprofen (ADVIL/MOTRIN) 800 MG tablet         Review of Systems   Constitutional: Negative for fever.   Respiratory: Negative for shortness of breath.    Cardiovascular: Negative for chest pain.   Gastrointestinal: Positive for abdominal pain. Negative for blood in stool, nausea and vomiting.   Genitourinary: Negative for dysuria.       Physical Exam   BP: 129/78  Pulse: 112  Temp: 98.2  F (36.8  C)  Resp: 16  Height: 172.7 cm (5' 8\")  Weight: 96.6 kg (213 lb)  SpO2: 98 %      Physical Exam   Constitutional: He is oriented to person, place, and time. He appears well-developed and well-nourished. No distress.   HENT:   Head: Normocephalic and atraumatic.   Eyes: Pupils are equal, round, and reactive to light. Conjunctivae and EOM are normal. No scleral icterus.   Neck: Normal range of motion. Neck supple.   Cardiovascular: Normal rate, regular rhythm and normal heart sounds.   No murmur heard.  Pulmonary/Chest: Effort normal and breath sounds normal.   Abdominal: Soft. He exhibits no distension and no mass. There is tenderness. There is no rebound and no guarding.   ttp epigastric    Musculoskeletal: Normal range of motion. He exhibits no deformity.   Lymphadenopathy:     He has no cervical adenopathy.   Neurological: He is alert and oriented to person, place, and time. No cranial nerve deficit. Coordination normal.   Skin: Skin is warm and dry. No rash noted. He is not diaphoretic.   Psychiatric: He has a normal mood and affect. His behavior is normal. Judgment and thought content normal.       ED Course        Procedures               Critical Care time:  none               Results for orders placed or performed during the hospital encounter of 06/22/19 (from the past 24 hour(s))   CBC with platelets differential   Result Value Ref Range    WBC 10.4 4.0 - 11.0 10e9/L    RBC Count 6.13 (H) 4.4 - 5.9 10e12/L    Hemoglobin 18.1 (H) 13.3 - 17.7 g/dL    Hematocrit 52.5 40.0 - 53.0 %    MCV 86 78 - 100 fl    MCH 29.5 " 26.5 - 33.0 pg    MCHC 34.5 31.5 - 36.5 g/dL    RDW 13.2 10.0 - 15.0 %    Platelet Count 242 150 - 450 10e9/L    Diff Method Automated Method     % Neutrophils 73.7 %    % Lymphocytes 19.1 %    % Monocytes 5.0 %    % Eosinophils 0.8 %    % Basophils 0.5 %    % Immature Granulocytes 0.9 %    Absolute Neutrophil 7.7 1.6 - 8.3 10e9/L    Absolute Lymphocytes 2.0 0.8 - 5.3 10e9/L    Absolute Monocytes 0.5 0.0 - 1.3 10e9/L    Absolute Eosinophils 0.1 0.0 - 0.7 10e9/L    Absolute Basophils 0.1 0.0 - 0.2 10e9/L    Abs Immature Granulocytes 0.1 0 - 0.4 10e9/L   Comprehensive metabolic panel   Result Value Ref Range    Sodium 137 134 - 144 mmol/L    Potassium 3.5 3.5 - 5.1 mmol/L    Chloride 104 98 - 107 mmol/L    Carbon Dioxide 24 21 - 31 mmol/L    Anion Gap 9 3 - 14 mmol/L    Glucose 141 (H) 70 - 105 mg/dL    Urea Nitrogen 11 7 - 25 mg/dL    Creatinine 1.08 0.70 - 1.30 mg/dL    GFR Estimate 84 >60 mL/min/[1.73_m2]    GFR Estimate If Black >90 >60 mL/min/[1.73_m2]    Calcium 9.8 8.6 - 10.3 mg/dL    Bilirubin Total 1.2 (H) 0.3 - 1.0 mg/dL    Albumin 4.7 3.5 - 5.7 g/dL    Protein Total 7.2 6.4 - 8.9 g/dL    Alkaline Phosphatase 86 34 - 104 U/L    ALT 40 7 - 52 U/L    AST 15 13 - 39 U/L   Lipase   Result Value Ref Range    Lipase 5 (L) 11 - 82 U/L   *UA reflex to Microscopic   Result Value Ref Range    Color Urine Yellow     Appearance Urine Clear     Glucose Urine Negative NEG^Negative mg/dL    Bilirubin Urine Small (A) NEG^Negative    Ketones Urine 15 (A) NEG^Negative mg/dL    Specific Gravity Urine <1.005 1.000 - 1.030    Blood Urine Negative NEG^Negative    pH Urine 7.0 5.0 - 9.0 pH    Protein Albumin Urine Negative NEG^Negative mg/dL    Urobilinogen Urine 0.2 0.2 - 1.0 EU/dL    Nitrite Urine Negative NEG^Negative    Leukocyte Esterase Urine Negative NEG^Negative    Source Midstream Urine    Urine Microscopic   Result Value Ref Range    WBC Urine 0 - 5 OTO5^0 - 5 /HPF    RBC Urine O - 2 OTO2^O - 2 /HPF    Squamous  Epithelial /LPF Urine Few FEW^Few /LPF    Bacteria Urine Few (A) NEG^Negative /HPF    Mucous Urine Present (A) NEG^Negative /LPF   US Abdomen Limited    Narrative    PROCEDURES: US ABDOMEN LIMITED    HISTORY: RUQ/epigastric pain, gallbladder?    TECHNIQUE: Grayscale ultrasound of the upper abdomen was performed.    COMPARISON: none     FINDINGS:    MEASUREMENTS:     LIVER: The liver is free of masses or biliary ductal enlargement.    GALLBLADDER: No gallstones are seen. Common hepatic duct measured 5 mm  which is within normal limits.    ABDOMINAL AORTA AND IVC: The abdominal aorta is normally tapering.  Inferior vena cava is patent.    PANCREAS: The pancreas was obscured by bowel gas.    Right KIDNEY: Right kidney is free of masses. There is minimal  dilatation of the calyces and renal pelvis.          Impression    IMPRESSION:     No right renal calculi are noted. There is mild dilation of the right  renal collecting system    No gallstones are seen.    JAMIN MAYORGA MD       Medications - No data to display    Assessments & Plan (with Medical Decision Making)   Patient is nontoxic-appearing no acute distress.  Heart, lung, bowel sounds normal.  Abdomen soft but slight tender to palpation in right upper quadrant/epigastric region. VSS, afebrile.     WBC 10.4.  Small bilirubin and urine, serum bilirubin 1.2.  Lipase 5.  Otherwise normal and non-concerning lab work.    Right upper quadrant ultrasound obtained which showed No right renal calculi are noted. There is mild dilation of the right  renal collecting system    No gallstones are seen.       Patient declined an IV, fentanyl and Protonix.  However, upon reassessment appeared to be doing better.    Patient appeared to have a nonsurgical abdomen with well-appearing in the lab work, negative right upper quadrant ultrasound findings seem more consistent with an ulcer or gastritis.  I will add Zantac to his home regimen and referral placed for patient to  follow-up with surgery for a consult.  Strict return precautions are given.  Patient understood and agree with plan he was discharged.    Cabrera Brandt PA-C      I have reviewed the nursing notes.    I have reviewed the findings, diagnosis, plan and need for follow up with the patient.          Medication List      Started    ranitidine 150 MG tablet  Commonly known as:  ZANTAC  150 mg, Oral, 2 TIMES DAILY            Final diagnoses:   Abdominal pain, epigastric       6/22/2019   [facility]     Cabrera Brandt PA  06/23/19 [id number]

## 2019-06-22 NOTE — LETTER
June 22, 2019      To Whom It May Concern:      Hi Kinney was seen in our Emergency Department today, 06/22/19.  I expect his condition to improve over the next 14 days.  He may return to work and perform light limited duty during this time.    Sincerely,        NOAH Hernandes

## 2019-06-22 NOTE — DISCHARGE INSTRUCTIONS
Get plenty of fluids and rest.  Take your medication as directed.  You can also try Tylenol if it helps.  Is unclear what is causing your symptoms today but more than likely may be due to a ulcer.  Referral will be placed for you to follow-up with a surgeon.  Return to the emergency department if you have worsening or concerning symptoms.

## 2019-06-22 NOTE — ED AVS SNAPSHOT
[facility]  [address]  [place] [address]  Phone:  [phone]  Fax:  [phone]                                    Hi Kinney   MRN: [id number]    Department:  [facility]   Date of Visit:  6/22/2019           After Visit Summary Signature Page    I have received my discharge instructions, and my questions have been answered. I have discussed any challenges I see with this plan with the nurse or doctor.    ..........................................................................................................................................  Patient/Patient Representative Signature      ..........................................................................................................................................  Patient Representative Print Name and Relationship to Patient    ..................................................               ................................................  Date                                   Time    ..........................................................................................................................................  Reviewed by Signature/Title    ...................................................              ..............................................  Date                                               Time          22EPIC Rev 08/18

## 2019-06-22 NOTE — ED TRIAGE NOTES
States is being treated for a possible ulcer and states over past couple days pain has become severe. Denies vomiting.   did have diarrhea about a week ago but that is now gone.  States that it happens after he eats and is very severe after he has dairy products.

## 2019-07-03 ENCOUNTER — HOSPITAL ENCOUNTER (EMERGENCY)
Facility: OTHER | Age: 24
Discharge: HOME | End: 2019-07-03
Attending: FAMILY MEDICINE | Admitting: FAMILY MEDICINE
Payer: COMMERCIAL

## 2019-07-03 ENCOUNTER — APPOINTMENT (OUTPATIENT)
Dept: CT IMAGING | Facility: OTHER | Age: 24
End: 2019-07-03
Attending: FAMILY MEDICINE
Payer: COMMERCIAL

## 2019-07-03 VITALS
BODY MASS INDEX: 31.83 KG/M2 | HEIGHT: 68 IN | TEMPERATURE: 97.6 F | RESPIRATION RATE: 10 BRPM | OXYGEN SATURATION: 98 % | DIASTOLIC BLOOD PRESSURE: 84 MMHG | HEART RATE: 91 BPM | SYSTOLIC BLOOD PRESSURE: 140 MMHG | WEIGHT: 210 LBS

## 2019-07-03 DIAGNOSIS — N20.1 LEFT URETERAL STONE: ICD-10-CM

## 2019-07-03 LAB
ALBUMIN SERPL-MCNC: 4.8 G/DL (ref 3.5–5.7)
ALBUMIN UR-MCNC: NEGATIVE MG/DL
ALP SERPL-CCNC: 89 U/L (ref 34–104)
ALT SERPL W P-5'-P-CCNC: 47 U/L (ref 7–52)
ANION GAP SERPL CALCULATED.3IONS-SCNC: 8 MMOL/L (ref 3–14)
APPEARANCE UR: CLEAR
AST SERPL W P-5'-P-CCNC: 18 U/L (ref 13–39)
BASOPHILS # BLD AUTO: 0.1 10E9/L (ref 0–0.2)
BASOPHILS NFR BLD AUTO: 0.5 %
BILIRUB SERPL-MCNC: 0.4 MG/DL (ref 0.3–1)
BILIRUB UR QL STRIP: NEGATIVE
BUN SERPL-MCNC: 11 MG/DL (ref 7–25)
CALCIUM SERPL-MCNC: 9.7 MG/DL (ref 8.6–10.3)
CHLORIDE SERPL-SCNC: 103 MMOL/L (ref 98–107)
CO2 SERPL-SCNC: 26 MMOL/L (ref 21–31)
COLOR UR AUTO: YELLOW
CREAT SERPL-MCNC: 1.07 MG/DL (ref 0.7–1.3)
DIFFERENTIAL METHOD BLD: ABNORMAL
EOSINOPHIL # BLD AUTO: 0.1 10E9/L (ref 0–0.7)
EOSINOPHIL NFR BLD AUTO: 1.1 %
ERYTHROCYTE [DISTWIDTH] IN BLOOD BY AUTOMATED COUNT: 12.9 % (ref 10–15)
GFR SERPL CREATININE-BSD FRML MDRD: 85 ML/MIN/{1.73_M2}
GLUCOSE SERPL-MCNC: 95 MG/DL (ref 70–105)
GLUCOSE UR STRIP-MCNC: NEGATIVE MG/DL
HCT VFR BLD AUTO: 54.1 % (ref 40–53)
HGB BLD-MCNC: 18.3 G/DL (ref 13.3–17.7)
HGB UR QL STRIP: ABNORMAL
IMM GRANULOCYTES # BLD: 0.1 10E9/L (ref 0–0.4)
IMM GRANULOCYTES NFR BLD: 1 %
KETONES UR STRIP-MCNC: NEGATIVE MG/DL
LEUKOCYTE ESTERASE UR QL STRIP: NEGATIVE
LIPASE SERPL-CCNC: 9 U/L (ref 11–82)
LYMPHOCYTES # BLD AUTO: 2.3 10E9/L (ref 0.8–5.3)
LYMPHOCYTES NFR BLD AUTO: 20.3 %
MCH RBC QN AUTO: 29.2 PG (ref 26.5–33)
MCHC RBC AUTO-ENTMCNC: 33.8 G/DL (ref 31.5–36.5)
MCV RBC AUTO: 86 FL (ref 78–100)
MONOCYTES # BLD AUTO: 0.9 10E9/L (ref 0–1.3)
MONOCYTES NFR BLD AUTO: 8.5 %
NEUTROPHILS # BLD AUTO: 7.6 10E9/L (ref 1.6–8.3)
NEUTROPHILS NFR BLD AUTO: 68.6 %
NITRATE UR QL: NEGATIVE
NON-SQ EPI CELLS #/AREA URNS LPF: ABNORMAL /LPF
PH UR STRIP: 6.5 PH (ref 5–9)
PLATELET # BLD AUTO: 247 10E9/L (ref 150–450)
POTASSIUM SERPL-SCNC: 3.5 MMOL/L (ref 3.5–5.1)
PROT SERPL-MCNC: 7.2 G/DL (ref 6.4–8.9)
RBC # BLD AUTO: 6.26 10E12/L (ref 4.4–5.9)
RBC #/AREA URNS AUTO: ABNORMAL /HPF
SODIUM SERPL-SCNC: 137 MMOL/L (ref 134–144)
SOURCE: ABNORMAL
SP GR UR STRIP: 1.01 (ref 1–1.03)
UROBILINOGEN UR STRIP-ACNC: 0.2 EU/DL (ref 0.2–1)
WBC # BLD AUTO: 11.1 10E9/L (ref 4–11)
WBC #/AREA URNS AUTO: ABNORMAL /HPF

## 2019-07-03 PROCEDURE — 85025 COMPLETE CBC W/AUTO DIFF WBC: CPT | Performed by: FAMILY MEDICINE

## 2019-07-03 PROCEDURE — 25000125 ZZHC RX 250: Performed by: FAMILY MEDICINE

## 2019-07-03 PROCEDURE — 99283 EMERGENCY DEPT VISIT LOW MDM: CPT | Mod: Z6 | Performed by: FAMILY MEDICINE

## 2019-07-03 PROCEDURE — 80053 COMPREHEN METABOLIC PANEL: CPT | Performed by: FAMILY MEDICINE

## 2019-07-03 PROCEDURE — 83690 ASSAY OF LIPASE: CPT | Performed by: FAMILY MEDICINE

## 2019-07-03 PROCEDURE — 25000132 ZZH RX MED GY IP 250 OP 250 PS 637: Performed by: FAMILY MEDICINE

## 2019-07-03 PROCEDURE — 96374 THER/PROPH/DIAG INJ IV PUSH: CPT | Performed by: FAMILY MEDICINE

## 2019-07-03 PROCEDURE — 99284 EMERGENCY DEPT VISIT MOD MDM: CPT | Mod: 25 | Performed by: FAMILY MEDICINE

## 2019-07-03 PROCEDURE — 74176 CT ABD & PELVIS W/O CONTRAST: CPT | Mod: TC

## 2019-07-03 PROCEDURE — 81001 URINALYSIS AUTO W/SCOPE: CPT | Performed by: FAMILY MEDICINE

## 2019-07-03 PROCEDURE — 36415 COLL VENOUS BLD VENIPUNCTURE: CPT | Performed by: FAMILY MEDICINE

## 2019-07-03 RX ORDER — ALUMINA, MAGNESIA, AND SIMETHICONE 2400; 2400; 240 MG/30ML; MG/30ML; MG/30ML
15 SUSPENSION ORAL ONCE
Status: COMPLETED | OUTPATIENT
Start: 2019-07-03 | End: 2019-07-03

## 2019-07-03 RX ORDER — SODIUM CHLORIDE 9 MG/ML
1000 INJECTION, SOLUTION INTRAVENOUS CONTINUOUS
Status: DISCONTINUED | OUTPATIENT
Start: 2019-07-03 | End: 2019-07-04 | Stop reason: HOSPADM

## 2019-07-03 RX ORDER — ONDANSETRON 2 MG/ML
4 INJECTION INTRAMUSCULAR; INTRAVENOUS EVERY 30 MIN PRN
Status: DISCONTINUED | OUTPATIENT
Start: 2019-07-03 | End: 2019-07-04 | Stop reason: HOSPADM

## 2019-07-03 RX ORDER — LIDOCAINE HYDROCHLORIDE 20 MG/ML
15 SOLUTION OROPHARYNGEAL ONCE
Status: COMPLETED | OUTPATIENT
Start: 2019-07-03 | End: 2019-07-03

## 2019-07-03 RX ORDER — KETOROLAC TROMETHAMINE 30 MG/ML
30 INJECTION, SOLUTION INTRAMUSCULAR; INTRAVENOUS ONCE
Status: DISCONTINUED | OUTPATIENT
Start: 2019-07-03 | End: 2019-07-04 | Stop reason: HOSPADM

## 2019-07-03 RX ADMIN — LIDOCAINE HYDROCHLORIDE 15 ML: 20 SOLUTION ORAL; TOPICAL at 22:33

## 2019-07-03 RX ADMIN — ALUMINUM HYDROXIDE, MAGNESIUM HYDROXIDE, AND DIMETHICONE 15 ML: 400; 400; 40 SUSPENSION ORAL at 22:33

## 2019-07-03 ASSESSMENT — ENCOUNTER SYMPTOMS
ABDOMINAL PAIN: 1
VOMITING: 0
DIZZINESS: 0
DYSURIA: 0
ACTIVITY CHANGE: 0
WOUND: 0
APPETITE CHANGE: 0
NAUSEA: 0
DIFFICULTY URINATING: 0
FEVER: 0
HEMATURIA: 0
CHEST TIGHTNESS: 0
SHORTNESS OF BREATH: 0
FREQUENCY: 0
BACK PAIN: 0
ADENOPATHY: 0
FLANK PAIN: 0
CONFUSION: 0
BRUISES/BLEEDS EASILY: 0
DIARRHEA: 0
CHILLS: 0

## 2019-07-03 ASSESSMENT — MIFFLIN-ST. JEOR: SCORE: 1917.05

## 2019-07-03 NOTE — ED AVS SNAPSHOT
[facility]  [address]  [place] [address]  Phone:  [phone]  Fax:  [phone]                                    Hi Kinney   MRN: [id number]    Department:  [facility]   Date of Visit:  7/3/2019           After Visit Summary Signature Page    I have received my discharge instructions, and my questions have been answered. I have discussed any challenges I see with this plan with the nurse or doctor.    ..........................................................................................................................................  Patient/Patient Representative Signature      ..........................................................................................................................................  Patient Representative Print Name and Relationship to Patient    ..................................................               ................................................  Date                                   Time    ..........................................................................................................................................  Reviewed by Signature/Title    ...................................................              ..............................................  Date                                               Time          22EPIC Rev 08/18

## 2019-07-04 NOTE — ED PROVIDER NOTES
History     Chief Complaint   Patient presents with     Abdominal Pain     HPI  Hi Kinney is a 24 year old male who presents for abdominal pain for the past 2 weeks but has also has side pain for the last 2-3 weeks.  Feels like a hunger pain . Patient with long history of kidney stones. NO fever , chills or systemic symptoms. NO nausea , vomitting or diarrhea. 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 No pertinent past medical history.    Past Surgical History:    Past Surgical History:   Procedure Laterality Date     COMBINED CYSTOSCOPY, URETEROSCOPY, LASER HOLMIUM LITHOTRIPSY URETER(S) Left 3/17/2019    Procedure: COMBINED CYSTOSCOPY, URETEROSCOPY, LASER HOLMIUM LITHOTRIPSY URETER(S)and stent placement;  Surgeon: Veto Peralta MD;  Location:  OR     OTHER SURGICAL HISTORY      [id number],SKIN GRAFT,Right,calf     TONSILLECTOMY      No Comments Provided       Family History:    History reviewed. No pertinent family history.    Social History:  Marital Status:  Single [1]  Social History     Tobacco Use     Smoking status: Passive Smoke Exposure - Never Smoker     Smokeless tobacco: Never Used     Tobacco comment: Quit smoking: family members smoke   Substance Use Topics     Alcohol use: Yes     Comment: rare     Drug use: No        Medications:      hydrOXYzine (ATARAX) 25 MG tablet   omeprazole (PRILOSEC) 20 MG DR capsule   sucralfate (CARAFATE) 1 GM tablet   FLUoxetine (PROZAC) 20 MG capsule   ibuprofen (ADVIL/MOTRIN) 800 MG tablet         Review of Systems   Constitutional: Negative for activity change, appetite change,  "chills and fever.   HENT: Negative for congestion.    Eyes: Negative for visual disturbance.   Respiratory: Negative for chest tightness and shortness of breath.    Cardiovascular: Negative for chest pain.   Gastrointestinal: Positive for abdominal pain. Negative for diarrhea, nausea and vomiting.   Genitourinary: Negative for difficulty urinating, dysuria, enuresis, flank pain, frequency and hematuria.   Musculoskeletal: Negative for back pain.   Skin: Negative for rash and wound.   Neurological: Negative for dizziness and syncope.   Hematological: Negative for adenopathy. Does not bruise/bleed easily.   Psychiatric/Behavioral: Negative for confusion.       Physical Exam   BP: 140/84  Pulse: 91  Temp: 97.6  F (36.4  C)  Resp: 10  Height: 172.7 cm (5' 8\")  Weight: 95.3 kg (210 lb)  SpO2: 98 %      Physical Exam   Constitutional: He is oriented to person, place, and time. He appears well-developed and well-nourished.  Non-toxic appearance. He does not appear ill. No distress.   HENT:   Head: Normocephalic and atraumatic.   Mouth/Throat: Oropharynx is clear and moist.   Eyes: Pupils are equal, round, and reactive to light.   Cardiovascular: Normal rate and regular rhythm.   Pulmonary/Chest: Effort normal and breath sounds normal. No stridor. No respiratory distress. He has no wheezes. He has no rhonchi. He has no rales. He exhibits no tenderness.   Abdominal: Normal appearance and bowel sounds are normal. There is no hepatosplenomegaly or splenomegaly. There is tenderness in the left upper quadrant. There is no rigidity, no rebound, no guarding and negative Spence's sign. No hernia.   Neurological: He is alert and oriented to person, place, and time.   Skin: Skin is warm and dry. Capillary refill takes less than 2 seconds.   Nursing note and vitals reviewed.      ED Course        Procedures          Patient presents to ER with complaint of 2 week history of abdominal discomfort Patient triaged to exam room. Vital " "signs reviewed. History and exam completed. Labs and diagnostics ordere. UA with moderate hematuria. Given patients history of recurrent kidney stones CT ordered. CT confirming a 3mm left proximal ureteral stone with hydronephrosis likely source of pain . Patient still uncertain and wondering if could be related to stomach and \" ulcer\" GI cocktail given without any symptomatic change . Symptoms again most likely related to the proximal ureteral stone and hydronephrosis . Patient will schedule a follow up with DR Simms and contact his office in 2 days as they are closed tomorrow. He denies any needs for medications for pain control and has declined need for pain medications in the ER . Discussed need to do EGD to definitively diagnose an ulcer. He will schedule an appointment with his primary care doctor should he wish to discuss this further. He should return to ER for worsening or concerning symptoms , winnie any symptoms of infection      Results for orders placed or performed during the hospital encounter of 07/03/19   CT Abdomen Pelvis w/o Contrast    Narrative    EXAM:    CT Abdomen and Pelvis Without Contrast     EXAM DATE/TIME:    7/3/2019 9:28 PM     CLINICAL HISTORY:    24 years old, male; Abdominal pain; Acute; Prior surgery; Surgery date: 1-6   months; Surgery type: Cystoscopy, ureteroscopy, laser holmium lithotripsy   (left) 03/17/2019; Additional info: Flank pain, recurrent stone disease   suspected     TECHNIQUE:    Imaging protocol: Axial computed tomography images of the abdomen and pelvis   without contrast. Coronal and sagittal reformatted images were created and   reviewed.     COMPARISON:    CT ABDOMEN PELVIS W/O CONTRAST 4/26/2019 6:53 PM     FINDINGS:    Liver: Normal. No mass.    Gallbladder and bile ducts: Normal. No calcified stones. No ductal dilation.    Pancreas: Normal. No ductal dilation.    Spleen: Normal. No splenomegaly.    Adrenals: Normal. No mass.    Kidneys and ureters: Left-sided " hydronephrosis. 3 mm proximal ureteral stone.   Stable right ureteral fullness with no obstructing stone.   Stomach and bowel: Normal. No obstruction. Fecal loading.    Appendix: Normal appendix.    Intraperitoneal space: Normal. No free air. No significant fluid collection.    Vasculature: Normal. No abdominal aortic aneurysm.    Lymph nodes: Normal. No enlarged lymph nodes.    Bladder: Unremarkable as visualized.    Reproductive: Unremarkable as visualized.    Bones/joints: No acute fracture. No dislocation.    Soft tissues: Unremarkable.       Impression    IMPRESSION:    Mild left-sided hydronephrosis secondary to a 3 mm proximal ureteral stone        THIS DOCUMENT HAS BEEN ELECTRONICALLY SIGNED BY JESUS ECKERT MD   *UA reflex to Microscopic   Result Value Ref Range    Color Urine Yellow     Appearance Urine Clear     Glucose Urine Negative NEG^Negative mg/dL    Bilirubin Urine Negative NEG^Negative    Ketones Urine Negative NEG^Negative mg/dL    Specific Gravity Urine 1.015 1.000 - 1.030    Blood Urine Moderate (A) NEG^Negative    pH Urine 6.5 5.0 - 9.0 pH    Protein Albumin Urine Negative NEG^Negative mg/dL    Urobilinogen Urine 0.2 0.2 - 1.0 EU/dL    Nitrite Urine Negative NEG^Negative    Leukocyte Esterase Urine Negative NEG^Negative    Source Midstream Urine    Urine Microscopic   Result Value Ref Range    WBC Urine 0 - 5 OTO5^0 - 5 /HPF    RBC Urine 5-10 (A) OTO2^O - 2 /HPF    Squamous Epithelial /LPF Urine Few FEW^Few /LPF   CBC with platelets differential   Result Value Ref Range    WBC 11.1 (H) 4.0 - 11.0 10e9/L    RBC Count 6.26 (H) 4.4 - 5.9 10e12/L    Hemoglobin 18.3 (H) 13.3 - 17.7 g/dL    Hematocrit 54.1 (H) 40.0 - 53.0 %    MCV 86 78 - 100 fl    MCH 29.2 26.5 - 33.0 pg    MCHC 33.8 31.5 - 36.5 g/dL    RDW 12.9 10.0 - 15.0 %    Platelet Count 247 150 - 450 10e9/L    Diff Method Automated Method     % Neutrophils 68.6 %    % Lymphocytes 20.3 %    % Monocytes 8.5 %    % Eosinophils 1.1 %    %  Basophils 0.5 %    % Immature Granulocytes 1.0 %    Absolute Neutrophil 7.6 1.6 - 8.3 10e9/L    Absolute Lymphocytes 2.3 0.8 - 5.3 10e9/L    Absolute Monocytes 0.9 0.0 - 1.3 10e9/L    Absolute Eosinophils 0.1 0.0 - 0.7 10e9/L    Absolute Basophils 0.1 0.0 - 0.2 10e9/L    Abs Immature Granulocytes 0.1 0 - 0.4 10e9/L   Comprehensive metabolic panel   Result Value Ref Range    Sodium 137 134 - 144 mmol/L    Potassium 3.5 3.5 - 5.1 mmol/L    Chloride 103 98 - 107 mmol/L    Carbon Dioxide 26 21 - 31 mmol/L    Anion Gap 8 3 - 14 mmol/L    Glucose 95 70 - 105 mg/dL    Urea Nitrogen 11 7 - 25 mg/dL    Creatinine 1.07 0.70 - 1.30 mg/dL    GFR Estimate 85 >60 mL/min/[1.73_m2]    GFR Estimate If Black >90 >60 mL/min/[1.73_m2]    Calcium 9.7 8.6 - 10.3 mg/dL    Bilirubin Total 0.4 0.3 - 1.0 mg/dL    Albumin 4.8 3.5 - 5.7 g/dL    Protein Total 7.2 6.4 - 8.9 g/dL    Alkaline Phosphatase 89 34 - 104 U/L    ALT 47 7 - 52 U/L    AST 18 13 - 39 U/L   Lipase   Result Value Ref Range    Lipase 9 (L) 11 - 82 U/L      Results for orders placed or performed during the hospital encounter of 07/03/19 (from the past 24 hour(s))   *UA reflex to Microscopic   Result Value Ref Range    Color Urine Yellow     Appearance Urine Clear     Glucose Urine Negative NEG^Negative mg/dL    Bilirubin Urine Negative NEG^Negative    Ketones Urine Negative NEG^Negative mg/dL    Specific Gravity Urine 1.015 1.000 - 1.030    Blood Urine Moderate (A) NEG^Negative    pH Urine 6.5 5.0 - 9.0 pH    Protein Albumin Urine Negative NEG^Negative mg/dL    Urobilinogen Urine 0.2 0.2 - 1.0 EU/dL    Nitrite Urine Negative NEG^Negative    Leukocyte Esterase Urine Negative NEG^Negative    Source Midstream Urine    Urine Microscopic   Result Value Ref Range    WBC Urine 0 - 5 OTO5^0 - 5 /HPF    RBC Urine 5-10 (A) OTO2^O - 2 /HPF    Squamous Epithelial /LPF Urine Few FEW^Few /LPF   CBC with platelets differential   Result Value Ref Range    WBC 11.1 (H) 4.0 - 11.0 10e9/L    RBC  Count 6.26 (H) 4.4 - 5.9 10e12/L    Hemoglobin 18.3 (H) 13.3 - 17.7 g/dL    Hematocrit 54.1 (H) 40.0 - 53.0 %    MCV 86 78 - 100 fl    MCH 29.2 26.5 - 33.0 pg    MCHC 33.8 31.5 - 36.5 g/dL    RDW 12.9 10.0 - 15.0 %    Platelet Count 247 150 - 450 10e9/L    Diff Method Automated Method     % Neutrophils 68.6 %    % Lymphocytes 20.3 %    % Monocytes 8.5 %    % Eosinophils 1.1 %    % Basophils 0.5 %    % Immature Granulocytes 1.0 %    Absolute Neutrophil 7.6 1.6 - 8.3 10e9/L    Absolute Lymphocytes 2.3 0.8 - 5.3 10e9/L    Absolute Monocytes 0.9 0.0 - 1.3 10e9/L    Absolute Eosinophils 0.1 0.0 - 0.7 10e9/L    Absolute Basophils 0.1 0.0 - 0.2 10e9/L    Abs Immature Granulocytes 0.1 0 - 0.4 10e9/L   Comprehensive metabolic panel   Result Value Ref Range    Sodium 137 134 - 144 mmol/L    Potassium 3.5 3.5 - 5.1 mmol/L    Chloride 103 98 - 107 mmol/L    Carbon Dioxide 26 21 - 31 mmol/L    Anion Gap 8 3 - 14 mmol/L    Glucose 95 70 - 105 mg/dL    Urea Nitrogen 11 7 - 25 mg/dL    Creatinine 1.07 0.70 - 1.30 mg/dL    GFR Estimate 85 >60 mL/min/[1.73_m2]    GFR Estimate If Black >90 >60 mL/min/[1.73_m2]    Calcium 9.7 8.6 - 10.3 mg/dL    Bilirubin Total 0.4 0.3 - 1.0 mg/dL    Albumin 4.8 3.5 - 5.7 g/dL    Protein Total 7.2 6.4 - 8.9 g/dL    Alkaline Phosphatase 89 34 - 104 U/L    ALT 47 7 - 52 U/L    AST 18 13 - 39 U/L   Lipase   Result Value Ref Range    Lipase 9 (L) 11 - 82 U/L   CT Abdomen Pelvis w/o Contrast    Narrative    EXAM:    CT Abdomen and Pelvis Without Contrast     EXAM DATE/TIME:    7/3/2019 9:28 PM     CLINICAL HISTORY:    24 years old, male; Abdominal pain; Acute; Prior surgery; Surgery date: 1-6   months; Surgery type: Cystoscopy, ureteroscopy, laser holmium lithotripsy   (left) 03/17/2019; Additional info: Flank pain, recurrent stone disease   suspected     TECHNIQUE:    Imaging protocol: Axial computed tomography images of the abdomen and pelvis   without contrast. Coronal and sagittal reformatted images  were created and   reviewed.     COMPARISON:    CT ABDOMEN PELVIS W/O CONTRAST 4/26/2019 6:53 PM     FINDINGS:    Liver: Normal. No mass.    Gallbladder and bile ducts: Normal. No calcified stones. No ductal dilation.    Pancreas: Normal. No ductal dilation.    Spleen: Normal. No splenomegaly.    Adrenals: Normal. No mass.    Kidneys and ureters: Left-sided hydronephrosis. 3 mm proximal ureteral stone.   Stable right ureteral fullness with no obstructing stone.   Stomach and bowel: Normal. No obstruction. Fecal loading.    Appendix: Normal appendix.    Intraperitoneal space: Normal. No free air. No significant fluid collection.    Vasculature: Normal. No abdominal aortic aneurysm.    Lymph nodes: Normal. No enlarged lymph nodes.    Bladder: Unremarkable as visualized.    Reproductive: Unremarkable as visualized.    Bones/joints: No acute fracture. No dislocation.    Soft tissues: Unremarkable.       Impression    IMPRESSION:    Mild left-sided hydronephrosis secondary to a 3 mm proximal ureteral stone        THIS DOCUMENT HAS BEEN ELECTRONICALLY SIGNED BY JESUS ECKERT MD       Medications   0.9% sodium chloride BOLUS (1,000 mLs Intravenous Not Given 7/3/19 2128)     Followed by   sodium chloride 0.9% infusion (1,000 mLs Intravenous Not Given 7/3/19 2128)   ketorolac (TORADOL) injection 30 mg (30 mg Intravenous Not Given 7/3/19 2129)   ondansetron (ZOFRAN) injection 4 mg (has no administration in time range)       Assessments & Plan (with Medical Decision Making)     I have reviewed the nursing notes.    I have reviewed the findings, diagnosis, plan and need for follow up with the patient.         Medication List      There are no discharge medications for this visit.         Final diagnoses:   Left ureteral stone       7/3/2019   [facility] Yeni Perkins MD  07/03/19 2298

## 2019-07-04 NOTE — ED TRIAGE NOTES
"ED Nursing Triage Note (General)   ________________________________    Hi Kinney is a 24 year old Male that presents to triage private car  With history of  Abdominal pain and renal stones, abdominal pain has cont and is reported in all areas of his abdomen, eating and drinking well, but cont to have pain, no vomiting but some nausea, reported by patient   Significant symptoms had onset ongoing problem   /84   Pulse 91   Temp 97.6  F (36.4  C) (Tympanic)   Resp 10   Ht 1.727 m (5' 8\")   Wt 95.3 kg (210 lb)   SpO2 98%   BMI 31.93 kg/m  t  Patient appears alert , in mild distress., and cooperative behavior.      Airway: intact  Breathing noted as Normal.  Circulation Normal  Skin normal  Action taken:  Triage order initiated, to WR until room available      PRE HOSPITAL PRIOR LIVING SITUATION Alone  "

## 2019-07-04 NOTE — ED NOTES
Pt has been prescribed potassium citrate by urology.  Ever since taking this for the last two weeks, he has has stomach pain.  He stopped taking the medication and the MD said he wouldn't be surprised if he developed an ulcer.  Took zantac at noon.

## 2019-07-04 NOTE — DISCHARGE INSTRUCTIONS
Take aleve or ibuprofen or whatever has worked for you in the past for discomfort Drink plenty of fluids. Follow up if you develop fever or any other symptoms of infection

## 2019-08-14 ENCOUNTER — THERAPY VISIT (OUTPATIENT)
Dept: CHIROPRACTIC MEDICINE | Facility: OTHER | Age: 24
End: 2019-08-14
Attending: CHIROPRACTOR
Payer: COMMERCIAL

## 2019-08-14 DIAGNOSIS — M54.2 CERVICALGIA: ICD-10-CM

## 2019-08-14 DIAGNOSIS — G44.209 TENSION HEADACHE: ICD-10-CM

## 2019-08-14 DIAGNOSIS — M54.6 PAIN IN THORACIC SPINE: ICD-10-CM

## 2019-08-14 DIAGNOSIS — M99.01 SEGMENTAL AND SOMATIC DYSFUNCTION OF CERVICAL REGION: Primary | ICD-10-CM

## 2019-08-14 DIAGNOSIS — M54.50 LUMBAGO: ICD-10-CM

## 2019-08-14 DIAGNOSIS — M99.03 SEGMENTAL AND SOMATIC DYSFUNCTION OF LUMBAR REGION: ICD-10-CM

## 2019-08-14 DIAGNOSIS — M99.02 SEGMENTAL AND SOMATIC DYSFUNCTION OF THORACIC REGION: ICD-10-CM

## 2019-08-14 PROCEDURE — 98941 CHIROPRACT MANJ 3-4 REGIONS: CPT | Mod: AT | Performed by: CHIROPRACTOR

## 2019-08-14 PROCEDURE — 99212 OFFICE O/P EST SF 10 MIN: CPT | Mod: 25 | Performed by: CHIROPRACTOR

## 2019-08-14 NOTE — PROGRESS NOTES
PATIENT:  Hi Kinney is a 24 year old male presenting for neck/upper back pain with headaches and lower back pain.    PROBLEM:   Date of Initial Visit for this Episode:  8/14/2019    Visit #1    SUBJECTIVE / HPI: Patient presents with primary complaints of neck and upper back pain with headache symptoms as well as secondary complaints of lower back pain.  Patient states that approximately 1 week ago he slept funny.  Patient reports he propped himself up in his bed with multiple pillows in order to watch TV.  Patient fell asleep in an extreme forward flexed position.  Patient states that upon waking he was in pain.  Patient denied any traumatic events preceding flareup.  Patient reports he has had similar complaints in the past which has responded favorably to chiropractic care.  As symptoms do not seem to be improving patient presents for further evaluation and treatment of his symptoms.    Description and onset: Neck/upper back with headaches  Duration and Frequency of Pain: 1 week and frequent  Radiation of pain: No  Pain rated at it's worst: 5/10  Pain rated currently:  2/10  Pain course: Not changing  Worse with:  flexion  Improved by:  Tylenol  Additional Features: Headaches  Other Health Care Providers seen for this: Dr. Denver DC  Previous treatment: Chiropractic  Previous injury:Patient reports history of similar symptoms, no significant/specific traumas      Other Secondary/Associated Problems  Description, Duration and Location of Seondary Problem: low back    Duration and Frequency of Pain: 1 week and frequent  Radiation of pain: no  Pain rated at it's worst: 2/10  Pain rated currently:  2/10  Pain course: stable  Worse with:  Nothing specific  Improved by:  Tylenol  Additional Features: unremarkable  Other Health Care Providers seen for this: see prior comment  Previous treatment: see prior comment  Previous injury:see prior comment    See flowsheets in chart for details.  8/14/2019   Neck Disability  Index (  Todd BARNEY and Jes DOS SANTOS 1991. All rights reserved.; used with permission) 8/14/2019   SECTION 1 - PAIN INTENSITY 1   SECTION 2 - PERSONAL CARE 0   SECTION 3 - LIFTING 0   SECTION 4 - READING 1   SECTION 5 - HEADACHES 3   SECTION 6 - CONCENTRATION 1   SECTION 7 - WORK 0   SECTION 8 - DRIVING 1   SECTION 9 - SLEEPING 0   SECTION 10 - RECREATION 0   Count 10   Sum 7   Raw Score: /50 7   Neck Disability Index Score: (%) 14     Oswestry (ESTHER) Questionnaire    OSWESTRY DISABILITY INDEX 8/14/2019   Count 9   Sum 3   Oswestry Score (%) 6.67   Some recent data might be hidden          Functional limitations:  Neck flexion        Past D.C. Care: yes, helpful       Health History as reported by the patient: Excellent and Good      PAST MEDICAL HISTORY:  History reviewed. No pertinent past medical history.    PAST SURGICAL HISTORY:  Past Surgical History:   Procedure Laterality Date     COMBINED CYSTOSCOPY, URETEROSCOPY, LASER HOLMIUM LITHOTRIPSY URETER(S) Left 3/17/2019    Procedure: COMBINED CYSTOSCOPY, URETEROSCOPY, LASER HOLMIUM LITHOTRIPSY URETER(S)and stent placement;  Surgeon: Veto Peralta MD;  Location:  OR     OTHER SURGICAL HISTORY      [id number],SKIN GRAFT,Right,calf     TONSILLECTOMY      No Comments Provided       ALLERGIES:  Allergies   Allergen Reactions     Cefprozil      Other reaction(s): *Unknown - Childhood Rxn     Augmentin Rash     Childhood reaction       CURRENT MEDICATIONS:  Current Outpatient Medications   Medication Sig Dispense Refill     FLUoxetine (PROZAC) 20 MG capsule Take 1 capsule (20 mg) by mouth daily 30 capsule 3     ibuprofen (ADVIL/MOTRIN) 800 MG tablet Take 1 tablet (800 mg) by mouth every 8 hours as needed for moderate pain 30 tablet 0     hydrOXYzine (ATARAX) 25 MG tablet Take 1 tablet (25 mg) by mouth 3 times daily as needed for itching 60 tablet 0     omeprazole (PRILOSEC) 20 MG DR capsule Take 1 capsule (20 mg) by mouth daily 30 capsule 3       SOCIAL HISTORY:  Marital  Status: single (never ).  Children: no.  Occupation: Security with [employer]  Alcohol use:yes.  Tobacco use: Smoker: no.    FAMILY HISTORY:  No family history on file.    Patient Active Problem List   Diagnosis     Calculus of kidney     Hypocitraturia         ROS:  The patient denies any fevers, chills, vomiting, diarrhea, constipation,dysuria, hematuria, or urinary hesitancy or incontinence.  No shortness of breath, chest pain, or rashes.  Patient has been experiencing some nausea symptoms with his headaches.    OBJECTIVE:    DIAGNOSTICS:  No current spinal imaging taken.     PHYSICAL EXAM:     GENERAL APPEARANCE: healthy, alert, no distress and cooperative   GAIT: NORMAL      MUSCULOSKELETAL:   Posture: Generally poor.  Patient exhibits moderate rounding of shoulders bilaterally left side predominant.  Elevation noted of right iliac crest and right shoulder when compared to left side.  Moderate loss of thoracic kyphotic curvature along with moderate anterior head carriage.  Gait:  unremarkable.     Cervical performed actively measured approximately  ROM:   smooth/halting arc of motion   45/50 flexion end range pain   40/45 extension    35/45 RLF  40/45 LLF    70/85 RR         75/85 LR       -Maximal Foraminal Compression: Negative for focal or radicular complaints   Shoulder Depression: Localized stretching noted primarily left side no radicular complaints  -Distraction negative      Thoracic and Lumbar performed actively and measured approximately  ROM:  50/60 flexion TL junction stretching 50/60 extension    45/45 RLF    40/45 LLF   40/45 RR      40/45 LR    -Kemps: Negative  - Straight leg raise  -Leg length inequality: Negative  Other: Negative Ely's, negative Nachlas    +Tenderness: Elicited along the suboccipital ridge, left CT junction, T6 midline and over the left side of L4  +Muscle spasm: suboccipitals, left scalenes, left levator scapula upper trapezius primarily left side.  Localized taut  and tender fibers are present of the left quadratus lumborum as well as T-spine paraspinals surrounding T6  +Joint asymmetry and restriction: C1 right lateral flexion left rotation, C2 extension left lateral flexion, T2 extension right rotation, T6 extension, L for extension left lateral flexion.    ASSESSMENT: Hi Kinney is a 24 year old male denting with primary complaints of neck and upper back pain with secondary complaints of lower back pain.  Patient appears to be a good candidate for chiropractic care and I do anticipate favorable response with short intervention therapy.  No contraindications present precluding patient from receiving care on today's visit.     1. Segmental and somatic dysfunction of cervical region    2. Cervicalgia    3. Tension headache    4. Segmental and somatic dysfunction of thoracic region    5. Pain in thoracic spine    6. Segmental and somatic dysfunction of lumbar region    7. Lumbago        PLAN    Evaluation and Management:  57510 Low to Moderate exam established patient 10 min    Procedures:  Modalities:  None performed this visit    CMT:  02100 Chiropractic manipulative treatment 3-4 regions performed   Cervical: Diversified, C1 , C2, Supine  Thoracic: Diversified, T2, T6, Prone  Lumbar: Diversified, L4, Side posture    Therapeutic procedures:  None    Response to Treatment  Reduction in symptoms as reported by patient    Prognosis: Excellent    8/14/2019 Plan of Care:  2-4 visits of Chiropractic Care including Spinal Adjustments and/or physiotherapy and active rehabilitation, to include exercises in the office and/or at home to meet care plan goals.     Frequency: 1xweek for up to 4 weeks. A reevaluation would be clinically appropriate in 4visits, to determine progress and further course of care.    POC discussed and patient agreeable to plan of care.      8/14/2019 Goals:      Patient will report improved pain reduce neck pain and headaches.   Patient will demonstrate an  improved ability to complete Activities of Daily Living  as shown by a reported 10% reduced score on neck and index.    Patient will demonstrate improved ROM.        INSTRUCTIONS   utilize tennis ball massage for suboccipital muscles as recommended    Follow-up:  Return to care in 1 week.        Disclaimer: This note consists of symbols derived from keyboarding, dictation and/or voice recognition software. As a result, there may be errors in the script that have gone undetected. Please consider this when interpreting information found in this chart.

## 2019-09-21 ENCOUNTER — APPOINTMENT (OUTPATIENT)
Dept: CT IMAGING | Facility: OTHER | Age: 24
End: 2019-09-21
Attending: EMERGENCY MEDICINE
Payer: COMMERCIAL

## 2019-09-21 ENCOUNTER — HOSPITAL ENCOUNTER (EMERGENCY)
Facility: OTHER | Age: 24
Discharge: HOME | End: 2019-09-21
Attending: EMERGENCY MEDICINE | Admitting: EMERGENCY MEDICINE
Payer: COMMERCIAL

## 2019-09-21 VITALS
HEIGHT: 68 IN | HEART RATE: 86 BPM | OXYGEN SATURATION: 96 % | BODY MASS INDEX: 31.83 KG/M2 | SYSTOLIC BLOOD PRESSURE: 134 MMHG | WEIGHT: 210 LBS | RESPIRATION RATE: 14 BRPM | TEMPERATURE: 97.8 F | DIASTOLIC BLOOD PRESSURE: 70 MMHG

## 2019-09-21 DIAGNOSIS — R10.31 RLQ ABDOMINAL PAIN: ICD-10-CM

## 2019-09-21 LAB
ALBUMIN SERPL-MCNC: 4.4 G/DL (ref 3.5–5.7)
ALBUMIN UR-MCNC: NEGATIVE MG/DL
ALP SERPL-CCNC: 80 U/L (ref 34–104)
ALT SERPL W P-5'-P-CCNC: 30 U/L (ref 7–52)
ANION GAP SERPL CALCULATED.3IONS-SCNC: 11 MMOL/L (ref 3–14)
APPEARANCE UR: CLEAR
AST SERPL W P-5'-P-CCNC: 14 U/L (ref 13–39)
BASOPHILS # BLD AUTO: 0 10E9/L (ref 0–0.2)
BASOPHILS NFR BLD AUTO: 0.5 %
BILIRUB SERPL-MCNC: 0.5 MG/DL (ref 0.3–1)
BILIRUB UR QL STRIP: NEGATIVE
BUN SERPL-MCNC: 16 MG/DL (ref 7–25)
CALCIUM SERPL-MCNC: 9.5 MG/DL (ref 8.6–10.3)
CHLORIDE SERPL-SCNC: 103 MMOL/L (ref 98–107)
CO2 SERPL-SCNC: 25 MMOL/L (ref 21–31)
COLOR UR AUTO: YELLOW
CREAT SERPL-MCNC: 1.18 MG/DL (ref 0.7–1.3)
DIFFERENTIAL METHOD BLD: NORMAL
EOSINOPHIL # BLD AUTO: 0.2 10E9/L (ref 0–0.7)
EOSINOPHIL NFR BLD AUTO: 1.8 %
ERYTHROCYTE [DISTWIDTH] IN BLOOD BY AUTOMATED COUNT: 13 % (ref 10–15)
GFR SERPL CREATININE-BSD FRML MDRD: 76 ML/MIN/{1.73_M2}
GLUCOSE SERPL-MCNC: 103 MG/DL (ref 70–105)
GLUCOSE UR STRIP-MCNC: NEGATIVE MG/DL
HCT VFR BLD AUTO: 48.7 % (ref 40–53)
HGB BLD-MCNC: 16.5 G/DL (ref 13.3–17.7)
HGB UR QL STRIP: NEGATIVE
IMM GRANULOCYTES # BLD: 0.1 10E9/L (ref 0–0.4)
IMM GRANULOCYTES NFR BLD: 1 %
KETONES UR STRIP-MCNC: NEGATIVE MG/DL
LEUKOCYTE ESTERASE UR QL STRIP: NEGATIVE
LYMPHOCYTES # BLD AUTO: 3.2 10E9/L (ref 0.8–5.3)
LYMPHOCYTES NFR BLD AUTO: 36.2 %
MCH RBC QN AUTO: 28.7 PG (ref 26.5–33)
MCHC RBC AUTO-ENTMCNC: 33.9 G/DL (ref 31.5–36.5)
MCV RBC AUTO: 85 FL (ref 78–100)
MONOCYTES # BLD AUTO: 0.7 10E9/L (ref 0–1.3)
MONOCYTES NFR BLD AUTO: 8.4 %
NEUTROPHILS # BLD AUTO: 4.6 10E9/L (ref 1.6–8.3)
NEUTROPHILS NFR BLD AUTO: 52.1 %
NITRATE UR QL: NEGATIVE
PH UR STRIP: 7.5 PH (ref 5–9)
PLATELET # BLD AUTO: 237 10E9/L (ref 150–450)
POTASSIUM SERPL-SCNC: 3.5 MMOL/L (ref 3.5–5.1)
PROT SERPL-MCNC: 6.6 G/DL (ref 6.4–8.9)
RBC # BLD AUTO: 5.74 10E12/L (ref 4.4–5.9)
SODIUM SERPL-SCNC: 139 MMOL/L (ref 134–144)
SOURCE: NORMAL
SP GR UR STRIP: 1.01 (ref 1–1.03)
UROBILINOGEN UR STRIP-ACNC: 0.2 EU/DL (ref 0.2–1)
WBC # BLD AUTO: 8.8 10E9/L (ref 4–11)

## 2019-09-21 PROCEDURE — 80053 COMPREHEN METABOLIC PANEL: CPT | Performed by: EMERGENCY MEDICINE

## 2019-09-21 PROCEDURE — 36415 COLL VENOUS BLD VENIPUNCTURE: CPT | Performed by: EMERGENCY MEDICINE

## 2019-09-21 PROCEDURE — 99283 EMERGENCY DEPT VISIT LOW MDM: CPT | Mod: Z6 | Performed by: EMERGENCY MEDICINE

## 2019-09-21 PROCEDURE — 25500064 ZZH RX 255 OP 636: Performed by: EMERGENCY MEDICINE

## 2019-09-21 PROCEDURE — 99285 EMERGENCY DEPT VISIT HI MDM: CPT | Mod: 25 | Performed by: EMERGENCY MEDICINE

## 2019-09-21 PROCEDURE — 85025 COMPLETE CBC W/AUTO DIFF WBC: CPT | Performed by: EMERGENCY MEDICINE

## 2019-09-21 PROCEDURE — 81003 URINALYSIS AUTO W/O SCOPE: CPT | Performed by: EMERGENCY MEDICINE

## 2019-09-21 PROCEDURE — 74177 CT ABD & PELVIS W/CONTRAST: CPT | Mod: TC

## 2019-09-21 RX ADMIN — IOHEXOL 100 ML: 350 INJECTION, SOLUTION INTRAVENOUS at 14:02

## 2019-09-21 ASSESSMENT — MIFFLIN-ST. JEOR: SCORE: 1917.05

## 2019-09-21 NOTE — ED AVS SNAPSHOT
[facility]  [address]  [place] [address]  Phone:  [phone]  Fax:  [phone]                                    Hi Kinney   MRN: [id number]    Department:  [facility]   Date of Visit:  9/21/2019           After Visit Summary Signature Page    I have received my discharge instructions, and my questions have been answered. I have discussed any challenges I see with this plan with the nurse or doctor.    ..........................................................................................................................................  Patient/Patient Representative Signature      ..........................................................................................................................................  Patient Representative Print Name and Relationship to Patient    ..................................................               ................................................  Date                                   Time    ..........................................................................................................................................  Reviewed by Signature/Title    ...................................................              ..............................................  Date                                               Time          22EPIC Rev 08/18

## 2019-09-21 NOTE — ED TRIAGE NOTES
"ED Nursing Triage Note (General)   ________________________________    Hi Kinney is a 24 year old Male that presents to triage private car  With history of  Lower abdominal pain/groin pain  Right side.  Hx of kidney stones but states this is different (stones usually on left) reported by patient   Significant symptoms had onset 8 hour(s) ago.  /73   Temp 97.8  F (36.6  C)   Resp 14   Ht 1.727 m (5' 8\")   Wt 95.3 kg (210 lb)   SpO2 96%   BMI 31.93 kg/m  t  Patient appears alert , in moderate distress., and cooperative behavior.    GCS Total = 15  Airway: intact  Breathing noted as Normal.  Circulation Normal  Skin normal  Action taken:  Triage order initiated      PRE HOSPITAL PRIOR LIVING SITUATION Alone  "

## 2019-09-21 NOTE — ED PROVIDER NOTES
"Hi Kinney  : 1995 Age: 24 year old Sex: male MRN: [id number]    CC: Abdominal pain    HPI: Hi Kinney is a 24 year old male with PMH significant for history of nephrolithiasis who presents with right lower quadrant abdominal pain.  He states he was feeling a little bit off yesterday and today woke up with abdominal pain that was initially periumbilical and later migrated down to the right lower quadrant.  He had one normal bowel movements a day as well as one bowel movement with diarrhea.  After the pain migrated down to the right lower quadrant continue to worsen and he presented to the hospital due to this.  He states the pain feels sharp and stabbing, and is different the pain he is experienced with nephrolithiasis previously.  Denies any fevers or chills.  Denies anorexia.  Denies testicular pain, denies penile discharge    ED Course and MDM:  Abdominal pain: Appendicitis versus nephrolithiasis versus diverticulitis versus constipation.  Plan for CBC, BMP, CT abdomen pelvis, urinalysis.  Overall labs returned unremarkable.  CT abdomen pelvis did not show any evidence of appendicitis.  With the negative work-up I discussed the results with the patient and he was discharged home with reassurance and return precautions.    Final Clinical Impression:  Abdominal pain, right lower quadrant    Tommy Alston MD  Internal Medicine and Emergency Medicine  1:27 PM 19      Physical Exam:  /85   Pulse 102   Temp 97.8  F (36.6  C) (Tympanic)   Resp 14   Ht 1.727 m (5' 8\")   Wt 95.3 kg (210 lb)   SpO2 96%   BMI 31.93 kg/m      Gen: Awake, alert, appears in mild distress  HEENT: Moist mucous membranes, extraocular movements intact  CV: Regular rate and rhythm, warm well perfused  Pulm: Nonlabored, equal chest rise  Abd: Soft, tender in the right lower quadrant with some rebound as well as some referred pain with palpation of the left lower quadrant.  No pain in the upper " abdomen  Ext: Full range of motion, no edema  Neuro: Oriented x3, no focal deficit  Psych: Appropriate affect, cognition intact    ROS:  10 point review of systems performed and negative except as noted in HPI.    Past Medical History:  History reviewed. No pertinent past medical history.      Family history:  History reviewed. No pertinent family history.      Social History:   Social History     Socioeconomic History     Marital status: Single     Spouse name: Not on file     Number of children: Not on file     Years of education: Not on file     Highest education level: Not on file   Occupational History     Not on file   Social Needs     Financial resource strain: Not on file     Food insecurity:     Worry: Not on file     Inability: Not on file     Transportation needs:     Medical: Not on file     Non-medical: Not on file   Tobacco Use     Smoking status: Passive Smoke Exposure - Never Smoker     Smokeless tobacco: Never Used     Tobacco comment: Quit smoking: family members smoke   Substance and Sexual Activity     Alcohol use: Yes     Comment: rare     Drug use: No     Sexual activity: Yes   Lifestyle     Physical activity:     Days per week: Not on file     Minutes per session: Not on file     Stress: Not on file   Relationships     Social connections:     Talks on phone: Not on file     Gets together: Not on file     Attends [employer] service: Not on file     Active member of club or organization: Not on file     Attends meetings of clubs or organizations: Not on file     Relationship status: Not on file     Intimate partner violence:     Fear of current or ex partner: Not on file     Emotionally abused: Not on file     Physically abused: Not on file     Forced sexual activity: Not on file   Other Topics Concern     Parent/sibling w/ CABG, MI or angioplasty before 65F 55M? Not Asked   Social History Narrative    Lives with both parents, has a younger sister.    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         Surgical History:  Past Surgical History:   Procedure Laterality Date     COMBINED CYSTOSCOPY, URETEROSCOPY, LASER HOLMIUM LITHOTRIPSY URETER(S) Left 3/17/2019    Procedure: COMBINED CYSTOSCOPY, URETEROSCOPY, LASER HOLMIUM LITHOTRIPSY URETER(S)and stent placement;  Surgeon: Veto Peralta MD;  Location: [facility] OR     OTHER SURGICAL HISTORY      [id number],SKIN GRAFT,Right,calf     TONSILLECTOMY      No Comments Provided                  Tommy Alston MD  09/21/19 2338

## 2019-09-21 NOTE — DISCHARGE INSTRUCTIONS
You were seen today for abdominal pain. There was no evidence of appendicitis on CT. While there is not a clear cause for your abdominal pain, at this time it does not appear to be the result of something dangerous.

## 2019-09-23 ENCOUNTER — OFFICE VISIT (OUTPATIENT)
Dept: UROLOGY | Facility: OTHER | Age: 24
End: 2019-09-23
Attending: UROLOGY
Payer: COMMERCIAL

## 2019-09-23 VITALS
RESPIRATION RATE: 16 BRPM | DIASTOLIC BLOOD PRESSURE: 78 MMHG | SYSTOLIC BLOOD PRESSURE: 122 MMHG | WEIGHT: 206 LBS | BODY MASS INDEX: 31.32 KG/M2 | HEART RATE: 68 BPM

## 2019-09-23 DIAGNOSIS — N20.0 KIDNEY STONES: Primary | ICD-10-CM

## 2019-09-23 PROCEDURE — 99214 OFFICE O/P EST MOD 30 MIN: CPT | Performed by: UROLOGY

## 2019-09-23 ASSESSMENT — PAIN SCALES - GENERAL: PAINLEVEL: NO PAIN (0)

## 2019-09-23 NOTE — PROGRESS NOTES
Type of Visit  Established    Chief Complaint  History of kidney stones  Hypocitraturia    HPI  Mr. Kinney is a 24 year old male with history of kidney stones for the last 15 years who follows up 2 days after a visit to the emergency department.  He underwent a CT scan at that visit revealing a new 7 mm nonobstructing left-sided kidney stone.  The patient underwent Left URS on 3/2019.  In addition he had a large stone lodged at his meatus which was also treated.  The patient has undergone a work-up revealing significant hypocitraturia.  Unfortunately he was unable to tolerate potassium citrate supplementation due to severe GI distress.  The patient does have intermittent left flank pain.  He is currently pain-free.  He denies gross hematuria and dysuria.      Review of Systems  I reviewed the ROS with the patient.    Nursing Notes:   Rosalinda Read LPN  9/23/2019  9:45 AM  Signed  Pt presents to clinic for follow up - left kidney stone    Review of Systems:    Weight loss:    No     Recent fever/chills:  No   Night sweats:   Yes  Current skin rash:  No   Recent hair loss:  No  Heat intolerance:  No   Cold intolerance:  No  Chest pain:   No   Palpitations:   No  Shortness of breath:  No   Wheezing:   No  Constipation:    No   Diarrhea:   Yes   Nausea:   No   Vomiting:   No   Kidney/side pain:  Yes   Back pain:   Yes  Frequent headaches:  No   Dizziness:     No  Leg swelling:   No   Calf pain:    No            Physical Exam  /78 (BP Location: Left arm, Patient Position: Sitting, Cuff Size: Adult Regular)   Pulse 68   Resp 16   Wt 93.4 kg (206 lb)   BMI 31.32 kg/m    Constitutional: NAD, WDWN.  Cardiovascular: Regular rate.  Pulmonary/Chest: Respirations are even and non-labored bilaterally.  Abdominal: Soft. No distension, tenderness, masses or guarding.  Back: - left CVA tenderness,  - right CVA tenderness.   Extremities: ADELA x 4, Warm. No clubbing.  No cyanosis.    Skin: Pink, warm and dry.  No rashes  noted.    Labs  Results for orders placed or performed during the hospital encounter of 09/21/19   CT Abdomen Pelvis w Contrast    Narrative    PROCEDURE INFORMATION:   Exam: CT Abdomen and Pelvis With Contrast   Exam date and time: 9/21/2019 1:53 PM   Clinical history: 24 years old, male; Pain; Other: Ruq; Prior surgery; Surgery   date: 6+ months; Surgery type: HX of litho; Additional info: See the clinical   information for interpreting provider     TECHNIQUE:   Imaging protocol: Computed tomography of the abdomen and pelvis with   intravenous contrast.   Radiation optimization: All CT scans at this facility use at least one of these   dose optimization techniques: automated exposure control; mA and/or kV   adjustment per patient size (includes targeted exams where dose is matched to   clinical indication); or iterative reconstruction.   Contrast material: OMNIPAQUE 350; Contrast volume: 100 ml; Contrast route: IV;      COMPARISON:   CT ABDOMEN PELVIS W/O CONTRAST 4/26/2019 6:53 PM     FINDINGS:     Liver: Normal. No mass.   Gallbladder and bile ducts: Normal. No calcified stones. No ductal dilation.   Pancreas: Normal. No ductal dilation.   Spleen: Normal. No splenomegaly.   Adrenals: Normal. No mass.   Kidneys and ureters: Nonobstructing 7 mm left renal stone. Stable prominence of   the right renal pelvis the right ureter without evidence of distant   obstruction.   Stomach and bowel: Unremarkable. No obstruction. No mucosal thickening.   Appendix: No evidence of appendicitis.   Intraperitoneal space: Unremarkable. No free air. No significant fluid   collection.   Vasculature: Unremarkable. No abdominal aortic aneurysm.   Lymph nodes: Unremarkable. No enlarged lymph nodes.     Bladder: Unremarkable as visualized.   Reproductive: Unremarkable as visualized.   Bones/joints: Unremarkable. No acute fracture.   Soft tissues: Unremarkable.       Impression    IMPRESSION:   1. Stable prominence of the right renal  pelvis and right ureter without   evidence of nephrolithiasis or ureterolithiasis.   2. 7 mm nonobstructing left renal stone.     THIS DOCUMENT HAS BEEN ELECTRONICALLY SIGNED BY JORDYN PORTILLO DO   *UA reflex to Microscopic   Result Value Ref Range    Color Urine Yellow     Appearance Urine Clear     Glucose Urine Negative NEG^Negative mg/dL    Bilirubin Urine Negative NEG^Negative    Ketones Urine Negative NEG^Negative mg/dL    Specific Gravity Urine 1.010 1.000 - 1.030    Blood Urine Negative NEG^Negative    pH Urine 7.5 5.0 - 9.0 pH    Protein Albumin Urine Negative NEG^Negative mg/dL    Urobilinogen Urine 0.2 0.2 - 1.0 EU/dL    Nitrite Urine Negative NEG^Negative    Leukocyte Esterase Urine Negative NEG^Negative    Source Midstream Urine    CBC with platelets differential   Result Value Ref Range    WBC 8.8 4.0 - 11.0 10e9/L    RBC Count 5.74 4.4 - 5.9 10e12/L    Hemoglobin 16.5 13.3 - 17.7 g/dL    Hematocrit 48.7 40.0 - 53.0 %    MCV 85 78 - 100 fl    MCH 28.7 26.5 - 33.0 pg    MCHC 33.9 31.5 - 36.5 g/dL    RDW 13.0 10.0 - 15.0 %    Platelet Count 237 150 - 450 10e9/L    Diff Method Automated Method     % Neutrophils 52.1 %    % Lymphocytes 36.2 %    % Monocytes 8.4 %    % Eosinophils 1.8 %    % Basophils 0.5 %    % Immature Granulocytes 1.0 %    Absolute Neutrophil 4.6 1.6 - 8.3 10e9/L    Absolute Lymphocytes 3.2 0.8 - 5.3 10e9/L    Absolute Monocytes 0.7 0.0 - 1.3 10e9/L    Absolute Eosinophils 0.2 0.0 - 0.7 10e9/L    Absolute Basophils 0.0 0.0 - 0.2 10e9/L    Abs Immature Granulocytes 0.1 0 - 0.4 10e9/L   Comprehensive metabolic panel   Result Value Ref Range    Sodium 139 134 - 144 mmol/L    Potassium 3.5 3.5 - 5.1 mmol/L    Chloride 103 98 - 107 mmol/L    Carbon Dioxide 25 21 - 31 mmol/L    Anion Gap 11 3 - 14 mmol/L    Glucose 103 70 - 105 mg/dL    Urea Nitrogen 16 7 - 25 mg/dL    Creatinine 1.18 0.70 - 1.30 mg/dL    GFR Estimate 76 >60 mL/min/[1.73_m2]    GFR Estimate If Black >90 >60 mL/min/[1.73_m2]     Calcium 9.5 8.6 - 10.3 mg/dL    Bilirubin Total 0.5 0.3 - 1.0 mg/dL    Albumin 4.4 3.5 - 5.7 g/dL    Protein Total 6.6 6.4 - 8.9 g/dL    Alkaline Phosphatase 80 34 - 104 U/L    ALT 30 7 - 52 U/L    AST 14 13 - 39 U/L     Results for TAMMY WALKER (MRN [id number]) as of 5/15/2019 09:47   3/25/2019 08:46   Magnesium 1.9   Phosphorus 1.8 (L)   Parathyroid Hormone Intact 16     Stone Analysis  3/17/2019  20% calcium oxalate  80% calcium phosphate     [facility]     4/28/2019 & 4/29/2019  Volume (L)  2.1-4.5   SS CaOx  2.27-5.4 (6-10)  Urine Calcium  203-213 (male<250)  Urine Oxalate  32-34  (20-40)  Urine Citrate  238-362 (male>450)  SS CaP  0.8-1.4  (0.5-2)  24 hr Urine pH  6.4-6.8  (5.8-6.2)  SS Uric Acid  0.07-0.31 (0-1)  Urine uric acid  0.76-0.777 (<0.8)    (collected while on treatment:  None)    Na: 107-206 and Cl:     Radiographic Studies  I personally reviewed and interpreted the images and report.  CT Stone  9/21/2019  IMPRESSION:   1. Stable prominence of the right renal pelvis and right ureter without   evidence of nephrolithiasis or ureterolithiasis.   2. 7 mm nonobstructing left renal stone.      ^^^^^^^^^^^^^^^^^^^^^^^^^^^^^^^^^^^^^^^^^    CT Stone  7/3/2019  IMPRESSION:    Mild left-sided hydronephrosis secondary to a 3 mm proximal ureteral stone    ^^^^^^^^^^^^^^^^^^^^^^^^^^^^^^^^^^^^^^^^^    CT Stone  4/26/2019  IMPRESSION:   1. No acute osseous pelvic CT findings.   2. Stable mild fullness of the right renal collecting system. Is there chronic in nature.   3. 1 mm nonobstructing left renal calculus.    Assessment  Mr. Walker is a 24 year old male with history of kidney stones with recurrent moderate-sized left kidney stone    We discussed the risks benefits and alternatives for treatment and the options of watchful waiting versus ureteroscopy and laser lithotripsy versus shock wave lithotripsy.    The patient preferred shockwave lithotrispy without stent insertion.  The patient was informed  the risks of bleeding, pain and infection and was told there was a 1-2% risk of a perinephric hematoma and a possibility of obstruction from stone fragments.  The patient was also told the stone might not fragment or it may require repeat treatment.  The patient was explained the inherent risks of anesthesia.  The patient was told that if a ureteral stent is placed, it would likely result in urinary frequency, urgency, and dysuria, as well as flank pain with voiding.  The stent would be removed at a later date as an office based procedure.  The patient was given the opportunity to ask any questions and after answering questions an informed consent was obtained.    Plan  Left extracorporal shockwave lithotripsy under general anesthesia 12/3/2019

## 2019-09-23 NOTE — NURSING NOTE
Pt presents to clinic for follow up - left kidney stone    Review of Systems:    Weight loss:    No     Recent fever/chills:  No   Night sweats:   Yes  Current skin rash:  No   Recent hair loss:  No  Heat intolerance:  No   Cold intolerance:  No  Chest pain:   No   Palpitations:   No  Shortness of breath:  No   Wheezing:   No  Constipation:    No   Diarrhea:   Yes   Nausea:   No   Vomiting:   No   Kidney/side pain:  Yes   Back pain:   Yes  Frequent headaches:  No   Dizziness:     No  Leg swelling:   No   Calf pain:    No

## 2019-09-26 ENCOUNTER — HOSPITAL ENCOUNTER (OUTPATIENT)
Facility: OTHER | Age: 24
End: 2019-09-26
Attending: UROLOGY | Admitting: UROLOGY
Payer: COMMERCIAL

## 2019-10-16 ENCOUNTER — THERAPY VISIT (OUTPATIENT)
Dept: CHIROPRACTIC MEDICINE | Facility: OTHER | Age: 24
End: 2019-10-16
Attending: CHIROPRACTOR
Payer: COMMERCIAL

## 2019-10-16 DIAGNOSIS — M99.01 SEGMENTAL AND SOMATIC DYSFUNCTION OF CERVICAL REGION: ICD-10-CM

## 2019-10-16 DIAGNOSIS — M54.6 PAIN IN THORACIC SPINE: ICD-10-CM

## 2019-10-16 DIAGNOSIS — M99.02 SEGMENTAL AND SOMATIC DYSFUNCTION OF THORACIC REGION: ICD-10-CM

## 2019-10-16 DIAGNOSIS — M54.2 CERVICALGIA: ICD-10-CM

## 2019-10-16 DIAGNOSIS — M99.03 SEGMENTAL AND SOMATIC DYSFUNCTION OF LUMBAR REGION: Primary | ICD-10-CM

## 2019-10-16 DIAGNOSIS — M54.50 ACUTE RIGHT-SIDED LOW BACK PAIN WITHOUT SCIATICA: ICD-10-CM

## 2019-10-16 PROCEDURE — 98941 CHIROPRACT MANJ 3-4 REGIONS: CPT | Mod: AT | Performed by: CHIROPRACTOR

## 2019-10-16 NOTE — PROGRESS NOTES
Visit #:  2    Subjective:  Hi Kinney is a 24 year old male who is seen in f/u up for:        Segmental and somatic dysfunction of lumbar region  Acute right-sided low back pain without sciatica  Segmental and somatic dysfunction of thoracic region  Pain in thoracic spine  Segmental and somatic dysfunction of cervical region  Cervicalgia.     Since last visit on 8/14/2019,  Hi Kinney reports: Last week patient noted he was attempting to put on his belt for work while at home.  Patient reports that he was laying on his bed to tighten up his belt when he stood up too quickly.  This caused pain to exacerbate his right side lower back.  Patient reports pain levels were at 5 out of 10 on a pain scale.  All symptoms are improving with time they have not fully resolved.  In that timeframe patient has also begun experiencing headache symptoms.  This is often a sign that patient's symptoms will not improve on their own and requires chiropractic intervention for full resolution.  Patient denied any traumatic events or other overuse injuries preceding flareup.  No radicular complaints present at this time.    Area of chief complaint:  Cervical, Thoracic and Lumbar :  Symptoms are graded at 2/10.          Objective:  The following was observed:  Oswestry (ESTHER) Questionnaire    OSWESTRY DISABILITY INDEX 10/16/2019   Count 10   Sum 0   Oswestry Score (%) 0   Some recent data might be hidden      Neck Disability Index (  Todd H. [employer] Jes C. 1991. All rights reserved.; used with permission) 10/16/2019   SECTION 1 - PAIN INTENSITY 1   SECTION 2 - PERSONAL CARE 0   SECTION 3 - LIFTING 0   SECTION 4 - READING 0   SECTION 5 - HEADACHES 3   SECTION 6 - CONCENTRATION 0   SECTION 7 - WORK 0   SECTION 8 - DRIVING 0   SECTION 9 - SLEEPING 0   SECTION 10 - RECREATION 0   Count 10   Sum 4   Raw Score: /50 4   Neck Disability Index Score: (%) 8     Cervical AROM: Generally unremarkable  Thoracic/lumbar AROM.  Mild pain with  extension.  Minimal restriction with right lateral flexion.    P: palpatory tendernessSuboccipital ridge bilaterally, right side T5, right side L4:    A: static palpation demonstrates intersegmental asymmetry , cervical, thoracic, lumbar  R: motion palpation notes restricted motion, C1 , C2 , T5  and L4   T: muscle spasm at level(s): Suboccipitals bilaterally, right lumbar paraspinals, right quadratus lumborum.  Taut and tender fibers localized around T5 right side predominant:      Segmental spinal dysfunction/restrictions found at:  :  C1 Left rotation restricted and Right lateral flexion restricted  C2 Left lateral flexion restricted and Extension restriction  T5 Left rotation restricted and Extension restriction  L4 Right lateral flexion restricted and Extension restriction.      Assessment: Patient appears to be experiencing mild flareup of symptoms.  Patient has been under our care with similar complaints and responded very favorably quickly with chiropractic intervention I do anticipate to be the case currently.  As symptoms do seem to be related to ADLs this can make follow-up care difficult to plan.  This is why patient is currently being recommended to follow-up as needed.    Diagnoses:      1. Segmental and somatic dysfunction of lumbar region    2. Acute right-sided low back pain without sciatica    3. Segmental and somatic dysfunction of thoracic region    4. Pain in thoracic spine    5. Segmental and somatic dysfunction of cervical region    6. Cervicalgia        Patient's condition:  Patient had restrictions pre-manipulation and Patient symptoms are worsed due to putting on his belt 1 week ago.    Treatment effectiveness:  Post manipulation there is better intersegmental movement and Patient claims to feel looser post manipulation      Procedures:  CMT:  21313 Chiropractic manipulative treatment 3-4 regions performed   Cervical: Diversified, C1 , C2, Supine  Thoracic: Diversified, T5, Prone  Lumbar:  Diversified, L4, Side posture    Modalities:  None performed this visit    Therapeutic procedures:  None    Response to Treatment  Reduction in symptoms as reported by patient    Prognosis: Excellent    Progress towards Goals: Goal: Reduce patient's low back pain  Use patient's headache symptoms     Recommendations:    Instructions:Continue to monitor symptoms and perform activities as tolerated    Follow-up:  Continue treatment PRN.

## 2019-11-02 ENCOUNTER — OFFICE VISIT (OUTPATIENT)
Dept: FAMILY MEDICINE | Facility: OTHER | Age: 24
End: 2019-11-02
Attending: NURSE PRACTITIONER
Payer: COMMERCIAL

## 2019-11-02 VITALS
WEIGHT: 209 LBS | TEMPERATURE: 97.5 F | SYSTOLIC BLOOD PRESSURE: 104 MMHG | DIASTOLIC BLOOD PRESSURE: 68 MMHG | HEIGHT: 68 IN | HEART RATE: 100 BPM | RESPIRATION RATE: 20 BRPM | BODY MASS INDEX: 31.67 KG/M2

## 2019-11-02 DIAGNOSIS — J01.01 ACUTE RECURRENT MAXILLARY SINUSITIS: Primary | ICD-10-CM

## 2019-11-02 PROCEDURE — 99213 OFFICE O/P EST LOW 20 MIN: CPT | Performed by: FAMILY MEDICINE

## 2019-11-02 RX ORDER — SULFAMETHOXAZOLE/TRIMETHOPRIM 800-160 MG
1 TABLET ORAL 2 TIMES DAILY
Qty: 20 TABLET | Refills: 0 | Status: ON HOLD | OUTPATIENT
Start: 2019-11-02 | End: 2019-11-17

## 2019-11-02 ASSESSMENT — PAIN SCALES - GENERAL: PAINLEVEL: NO PAIN (0)

## 2019-11-02 ASSESSMENT — MIFFLIN-ST. JEOR: SCORE: 1912.52

## 2019-11-02 NOTE — NURSING NOTE
"Chief Complaint   Patient presents with     Fatigue     Dizziness   Pt present to clinic today for dizziness and fatigue.    Initial /68 (BP Location: Right arm, Patient Position: Sitting, Cuff Size: Adult Regular)   Pulse 100   Temp 97.5  F (36.4  C) (Tympanic)   Resp 20   Ht 1.727 m (5' 8\")   Wt 94.8 kg (209 lb)   BMI 31.78 kg/m   Estimated body mass index is 31.78 kg/m  as calculated from the following:    Height as of this encounter: 1.727 m (5' 8\").    Weight as of this encounter: 94.8 kg (209 lb).  Medication Reconciliation: complete    Tamy Garcia LPN  "

## 2019-11-03 NOTE — PROGRESS NOTES
"Nursing Notes:   Tamy Garcia LPN  11/2/2019  7:01 PM  Signed  Chief Complaint   Patient presents with     Fatigue     Dizziness   Pt present to clinic today for dizziness and fatigue.    Initial /68 (BP Location: Right arm, Patient Position: Sitting, Cuff Size: Adult Regular)   Pulse 100   Temp 97.5  F (36.4  C) (Tympanic)   Resp 20   Ht 1.727 m (5' 8\")   Wt 94.8 kg (209 lb)   BMI 31.78 kg/m    Estimated body mass index is 31.78 kg/m  as calculated from the following:    Height as of this encounter: 1.727 m (5' 8\").    Weight as of this encounter: 94.8 kg (209 lb).  Medication Reconciliation: complete    Tamy Garcia LPN    SUBJECTIVE:   Hi Kinney is a 24 year old male who presents to clinic today for the following health issues:    HPI  Hi has been ill for a few days; but has had facial pressure, congestion and malaise developing for a week or so prior to that.  Some dental pain with chewing. + off balance feeling.   Has a history of recurrent sinus infections and this feels similar.  Has tried some OTC meds without relief.  Has been off of work this week; and was waiting out his symptoms.  Scheduled to come back to work on Wednesday and wants to be feeling better.  No fevers/chills.      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 No pertinent past medical history.   Past Surgical History:   Procedure Laterality Date     COMBINED CYSTOSCOPY, URETEROSCOPY, LASER HOLMIUM LITHOTRIPSY URETER(S) Left 3/17/2019    Procedure: COMBINED CYSTOSCOPY, URETEROSCOPY, LASER HOLMIUM LITHOTRIPSY URETER(S)and stent placement;  Surgeon: Veto Peralta MD;  Location:  OR     OTHER SURGICAL HISTORY      HHD329,SKIN GRAFT,Right,calf     TONSILLECTOMY      No Comments Provided     History reviewed. No pertinent family history.  Social History     Tobacco Use     " "Smoking status: Passive Smoke Exposure - Never Smoker     Smokeless tobacco: Never Used     Tobacco comment: Quit smoking: family members smoke   Substance Use Topics     Alcohol use: Yes     Comment: rare     Social History     Patient does not qualify to have social determinant information on file (likely too young).   Social History Narrative    Lives with both parents, has a younger sister.    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     Current Outpatient Medications   Medication Sig Dispense Refill     FLUoxetine (PROZAC) 20 MG capsule Take 1 capsule (20 mg) by mouth daily 30 capsule 3     hydrOXYzine (ATARAX) 25 MG tablet Take 1 tablet (25 mg) by mouth 3 times daily as needed for itching 60 tablet 0     sulfamethoxazole-trimethoprim (BACTRIM DS/SEPTRA DS) 800-160 MG tablet Take 1 tablet by mouth 2 times daily for 10 days 20 tablet 0     Allergies   Allergen Reactions     Cefprozil      Other reaction(s): *Unknown - Childhood Rxn     Augmentin Rash     Childhood reaction     Review of Systems   All other systems reviewed and are negative.     OBJECTIVE:     /68 (BP Location: Right arm, Patient Position: Sitting, Cuff Size: Adult Regular)   Pulse 100   Temp 97.5  F (36.4  C) (Tympanic)   Resp 20   Ht 1.727 m (5' 8\")   Wt 94.8 kg (209 lb)   BMI 31.78 kg/m    Body mass index is 31.78 kg/m .  Physical Exam  Vitals signs and nursing note reviewed.   Constitutional:       Appearance: Normal appearance.   HENT:      Head: Normocephalic and atraumatic.      Right Ear: Tympanic membrane and ear canal normal.      Left Ear: Tympanic membrane and ear canal normal.      Nose: Nose normal.   Neck:      Musculoskeletal: Normal range of motion and neck supple.   Cardiovascular:      Rate and Rhythm: Normal rate and regular rhythm.   Skin:     Capillary Refill: Capillary refill takes less than 2 seconds.   Neurological:      Mental Status: He is alert.   Psychiatric: "         Mood and Affect: Mood normal.         Judgement: Judgment normal.     Diagnostic Test Results:  none     ASSESSMENT/PLAN:     1. Acute recurrent maxillary sinusitis  With history of recurrent infections.  Elect to treat due to upcoming return to work as a ; and OTC regimens are not helpful.  Rx for bactrim DS x 10 days.  Other supportive cares.  - sulfamethoxazole-trimethoprim (BACTRIM DS/SEPTRA DS) 800-160 MG tablet; Take 1 tablet by mouth 2 times daily for 10 days  Dispense: 20 tablet; Refill: 0    Follow up prn.    Lola Victor, [facility]

## 2019-11-17 ENCOUNTER — APPOINTMENT (OUTPATIENT)
Dept: CT IMAGING | Facility: OTHER | Age: 24
End: 2019-11-17
Attending: FAMILY MEDICINE
Payer: COMMERCIAL

## 2019-11-17 ENCOUNTER — HOSPITAL ENCOUNTER (OUTPATIENT)
Facility: OTHER | Age: 24
Setting detail: OBSERVATION
Discharge: HOME | End: 2019-11-17
Attending: FAMILY MEDICINE | Admitting: FAMILY MEDICINE
Payer: COMMERCIAL

## 2019-11-17 ENCOUNTER — ANESTHESIA EVENT (OUTPATIENT)
Dept: SURGERY | Facility: OTHER | Age: 24
End: 2019-11-17
Payer: COMMERCIAL

## 2019-11-17 ENCOUNTER — APPOINTMENT (OUTPATIENT)
Dept: GENERAL RADIOLOGY | Facility: OTHER | Age: 24
End: 2019-11-17
Attending: UROLOGY
Payer: COMMERCIAL

## 2019-11-17 ENCOUNTER — ANESTHESIA (OUTPATIENT)
Dept: SURGERY | Facility: OTHER | Age: 24
End: 2019-11-17
Payer: COMMERCIAL

## 2019-11-17 VITALS
HEART RATE: 102 BPM | HEIGHT: 68 IN | TEMPERATURE: 97.7 F | SYSTOLIC BLOOD PRESSURE: 129 MMHG | WEIGHT: 209.6 LBS | OXYGEN SATURATION: 95 % | BODY MASS INDEX: 31.77 KG/M2 | RESPIRATION RATE: 16 BRPM | DIASTOLIC BLOOD PRESSURE: 88 MMHG

## 2019-11-17 DIAGNOSIS — N20.0 KIDNEY STONE ON LEFT SIDE: ICD-10-CM

## 2019-11-17 DIAGNOSIS — Z79.899 ENCOUNTER FOR LONG-TERM (CURRENT) USE OF OTHER MEDICATIONS: ICD-10-CM

## 2019-11-17 DIAGNOSIS — N13.2 HYDRONEPHROSIS WITH URINARY OBSTRUCTION DUE TO URETERAL CALCULUS: ICD-10-CM

## 2019-11-17 DIAGNOSIS — Z87.442 PERSONAL HISTORY OF URINARY CALCULI: ICD-10-CM

## 2019-11-17 LAB
ALBUMIN UR-MCNC: NEGATIVE MG/DL
ANION GAP SERPL CALCULATED.3IONS-SCNC: 11 MMOL/L (ref 3–14)
APPEARANCE UR: ABNORMAL
APTT PPP: 31 SEC (ref 22–37)
BASOPHILS # BLD AUTO: 0.1 10E9/L (ref 0–0.2)
BASOPHILS NFR BLD AUTO: 0.5 %
BILIRUB UR QL STRIP: NEGATIVE
BUN SERPL-MCNC: 12 MG/DL (ref 7–25)
CALCIUM SERPL-MCNC: 9.7 MG/DL (ref 8.6–10.3)
CHLORIDE SERPL-SCNC: 104 MMOL/L (ref 98–107)
CO2 SERPL-SCNC: 25 MMOL/L (ref 21–31)
COLOR UR AUTO: ABNORMAL
CREAT SERPL-MCNC: 1.08 MG/DL (ref 0.7–1.3)
DIFFERENTIAL METHOD BLD: ABNORMAL
EOSINOPHIL # BLD AUTO: 0.1 10E9/L (ref 0–0.7)
EOSINOPHIL NFR BLD AUTO: 0.8 %
ERYTHROCYTE [DISTWIDTH] IN BLOOD BY AUTOMATED COUNT: 13.1 % (ref 10–15)
GFR SERPL CREATININE-BSD FRML MDRD: 84 ML/MIN/{1.73_M2}
GLUCOSE SERPL-MCNC: 109 MG/DL (ref 70–105)
GLUCOSE UR STRIP-MCNC: NEGATIVE MG/DL
HCT VFR BLD AUTO: 50.2 % (ref 40–53)
HGB BLD-MCNC: 17.3 G/DL (ref 13.3–17.7)
HGB UR QL STRIP: ABNORMAL
IMM GRANULOCYTES # BLD: 0.1 10E9/L (ref 0–0.4)
IMM GRANULOCYTES NFR BLD: 0.9 %
INR PPP: 1.05 (ref 0–1.3)
KETONES UR STRIP-MCNC: NEGATIVE MG/DL
LEUKOCYTE ESTERASE UR QL STRIP: NEGATIVE
LYMPHOCYTES # BLD AUTO: 3.1 10E9/L (ref 0.8–5.3)
LYMPHOCYTES NFR BLD AUTO: 25.8 %
MCH RBC QN AUTO: 29.5 PG (ref 26.5–33)
MCHC RBC AUTO-ENTMCNC: 34.5 G/DL (ref 31.5–36.5)
MCV RBC AUTO: 86 FL (ref 78–100)
MONOCYTES # BLD AUTO: 0.7 10E9/L (ref 0–1.3)
MONOCYTES NFR BLD AUTO: 6.2 %
NEUTROPHILS # BLD AUTO: 7.8 10E9/L (ref 1.6–8.3)
NEUTROPHILS NFR BLD AUTO: 65.8 %
NITRATE UR QL: NEGATIVE
PH UR STRIP: 7 PH (ref 5–9)
PLATELET # BLD AUTO: 275 10E9/L (ref 150–450)
POTASSIUM SERPL-SCNC: 3.3 MMOL/L (ref 3.5–5.1)
RBC # BLD AUTO: 5.86 10E12/L (ref 4.4–5.9)
RBC #/AREA URNS AUTO: >100 /HPF
SODIUM SERPL-SCNC: 140 MMOL/L (ref 134–144)
SOURCE: ABNORMAL
SP GR UR STRIP: 1.01 (ref 1–1.03)
UROBILINOGEN UR STRIP-ACNC: 0.2 EU/DL (ref 0.2–1)
WBC # BLD AUTO: 11.9 10E9/L (ref 4–11)
WBC #/AREA URNS AUTO: ABNORMAL /HPF

## 2019-11-17 PROCEDURE — 81001 URINALYSIS AUTO W/SCOPE: CPT | Performed by: FAMILY MEDICINE

## 2019-11-17 PROCEDURE — 85730 THROMBOPLASTIN TIME PARTIAL: CPT | Performed by: FAMILY MEDICINE

## 2019-11-17 PROCEDURE — 36000060 ZZH SURGERY LEVEL 3 W FLUORO 1ST 30 MIN: Performed by: UROLOGY

## 2019-11-17 PROCEDURE — 25000128 H RX IP 250 OP 636: Performed by: NURSE ANESTHETIST, CERTIFIED REGISTERED

## 2019-11-17 PROCEDURE — 36415 COLL VENOUS BLD VENIPUNCTURE: CPT | Performed by: FAMILY MEDICINE

## 2019-11-17 PROCEDURE — 96374 THER/PROPH/DIAG INJ IV PUSH: CPT | Mod: XU | Performed by: FAMILY MEDICINE

## 2019-11-17 PROCEDURE — C1894 INTRO/SHEATH, NON-LASER: HCPCS | Performed by: UROLOGY

## 2019-11-17 PROCEDURE — 37000008 ZZH ANESTHESIA TECHNICAL FEE, 1ST 30 MIN: Performed by: UROLOGY

## 2019-11-17 PROCEDURE — 80048 BASIC METABOLIC PNL TOTAL CA: CPT | Performed by: FAMILY MEDICINE

## 2019-11-17 PROCEDURE — 74420 UROGRAPHY RTRGR +-KUB: CPT

## 2019-11-17 PROCEDURE — 25000128 H RX IP 250 OP 636: Performed by: FAMILY MEDICINE

## 2019-11-17 PROCEDURE — G0378 HOSPITAL OBSERVATION PER HR: HCPCS

## 2019-11-17 PROCEDURE — 99285 EMERGENCY DEPT VISIT HI MDM: CPT | Mod: 25 | Performed by: FAMILY MEDICINE

## 2019-11-17 PROCEDURE — 52356 CYSTO/URETERO W/LITHOTRIPSY: CPT | Performed by: UROLOGY

## 2019-11-17 PROCEDURE — 25500064 ZZH RX 255 OP 636: Performed by: UROLOGY

## 2019-11-17 PROCEDURE — 85025 COMPLETE CBC W/AUTO DIFF WBC: CPT | Performed by: FAMILY MEDICINE

## 2019-11-17 PROCEDURE — 36000058 ZZH SURGERY LEVEL 3 EA 15 ADDTL MIN: Performed by: UROLOGY

## 2019-11-17 PROCEDURE — C1758 CATHETER, URETERAL: HCPCS | Performed by: UROLOGY

## 2019-11-17 PROCEDURE — 99234 HOSP IP/OBS SM DT SF/LOW 45: CPT | Mod: 25 | Performed by: FAMILY MEDICINE

## 2019-11-17 PROCEDURE — C1769 GUIDE WIRE: HCPCS | Performed by: UROLOGY

## 2019-11-17 PROCEDURE — 25000125 ZZHC RX 250: Performed by: NURSE ANESTHETIST, CERTIFIED REGISTERED

## 2019-11-17 PROCEDURE — 74176 CT ABD & PELVIS W/O CONTRAST: CPT | Mod: TC

## 2019-11-17 PROCEDURE — 27210794 ZZH OR GENERAL SUPPLY STERILE: Performed by: UROLOGY

## 2019-11-17 PROCEDURE — 96375 TX/PRO/DX INJ NEW DRUG ADDON: CPT | Mod: XU | Performed by: FAMILY MEDICINE

## 2019-11-17 PROCEDURE — 52356 CYSTO/URETERO W/LITHOTRIPSY: CPT | Performed by: NURSE ANESTHETIST, CERTIFIED REGISTERED

## 2019-11-17 PROCEDURE — C2617 STENT, NON-COR, TEM W/O DEL: HCPCS | Performed by: UROLOGY

## 2019-11-17 PROCEDURE — 25800030 ZZH RX IP 258 OP 636: Performed by: NURSE ANESTHETIST, CERTIFIED REGISTERED

## 2019-11-17 PROCEDURE — 37000009 ZZH ANESTHESIA TECHNICAL FEE, EACH ADDTL 15 MIN: Performed by: UROLOGY

## 2019-11-17 PROCEDURE — 99244 OFF/OP CNSLTJ NEW/EST MOD 40: CPT | Mod: 25 | Performed by: UROLOGY

## 2019-11-17 PROCEDURE — 76499 UNLISTED DX RADIOGRAPHIC PX: CPT

## 2019-11-17 PROCEDURE — 25800025 ZZH RX 258: Performed by: UROLOGY

## 2019-11-17 PROCEDURE — 71000014 ZZH RECOVERY PHASE 1 LEVEL 2 FIRST HR: Performed by: UROLOGY

## 2019-11-17 PROCEDURE — 74420 UROGRAPHY RTRGR +-KUB: CPT | Mod: 26 | Performed by: UROLOGY

## 2019-11-17 PROCEDURE — 25800030 ZZH RX IP 258 OP 636: Performed by: FAMILY MEDICINE

## 2019-11-17 PROCEDURE — 99285 EMERGENCY DEPT VISIT HI MDM: CPT | Mod: Z6 | Performed by: FAMILY MEDICINE

## 2019-11-17 PROCEDURE — 85610 PROTHROMBIN TIME: CPT | Performed by: FAMILY MEDICINE

## 2019-11-17 PROCEDURE — 99140 ANES COMP EMERGENCY COND: CPT | Performed by: NURSE ANESTHETIST, CERTIFIED REGISTERED

## 2019-11-17 DEVICE — STENT URETERAL CONTOUR SOFT PERCUFLEX 6FRX26CM: Type: IMPLANTABLE DEVICE | Site: KIDNEY | Status: FUNCTIONAL

## 2019-11-17 RX ORDER — KETOROLAC TROMETHAMINE 15 MG/ML
15 INJECTION, SOLUTION INTRAMUSCULAR; INTRAVENOUS ONCE
Status: COMPLETED | OUTPATIENT
Start: 2019-11-17 | End: 2019-11-17

## 2019-11-17 RX ORDER — OMEGA-3 FATTY ACIDS/FISH OIL 300-1000MG
400 CAPSULE ORAL EVERY 6 HOURS PRN
COMMUNITY
End: 2020-04-08

## 2019-11-17 RX ORDER — KETAMINE HYDROCHLORIDE 50 MG/ML
INJECTION, SOLUTION INTRAMUSCULAR; INTRAVENOUS PRN
Status: DISCONTINUED | OUTPATIENT
Start: 2019-11-17 | End: 2019-11-17

## 2019-11-17 RX ORDER — DEXAMETHASONE SODIUM PHOSPHATE 4 MG/ML
INJECTION, SOLUTION INTRA-ARTICULAR; INTRALESIONAL; INTRAMUSCULAR; INTRAVENOUS; SOFT TISSUE PRN
Status: DISCONTINUED | OUTPATIENT
Start: 2019-11-17 | End: 2019-11-17

## 2019-11-17 RX ORDER — PROPOFOL 10 MG/ML
INJECTION, EMULSION INTRAVENOUS CONTINUOUS PRN
Status: DISCONTINUED | OUTPATIENT
Start: 2019-11-17 | End: 2019-11-17

## 2019-11-17 RX ORDER — KETOROLAC TROMETHAMINE 15 MG/ML
15 INJECTION, SOLUTION INTRAMUSCULAR; INTRAVENOUS EVERY 6 HOURS PRN
Status: DISCONTINUED | OUTPATIENT
Start: 2019-11-17 | End: 2019-11-17 | Stop reason: HOSPADM

## 2019-11-17 RX ORDER — ONDANSETRON 4 MG/1
4 TABLET, ORALLY DISINTEGRATING ORAL EVERY 30 MIN PRN
Status: DISCONTINUED | OUTPATIENT
Start: 2019-11-17 | End: 2019-11-17 | Stop reason: HOSPADM

## 2019-11-17 RX ORDER — LIDOCAINE HYDROCHLORIDE 20 MG/ML
INJECTION, SOLUTION INFILTRATION; PERINEURAL PRN
Status: DISCONTINUED | OUTPATIENT
Start: 2019-11-17 | End: 2019-11-17

## 2019-11-17 RX ORDER — LIDOCAINE 40 MG/G
CREAM TOPICAL
Status: CANCELLED | OUTPATIENT
Start: 2019-11-17

## 2019-11-17 RX ORDER — ONDANSETRON 2 MG/ML
4 INJECTION INTRAMUSCULAR; INTRAVENOUS EVERY 6 HOURS PRN
Status: DISCONTINUED | OUTPATIENT
Start: 2019-11-17 | End: 2019-11-17 | Stop reason: HOSPADM

## 2019-11-17 RX ORDER — NALOXONE HYDROCHLORIDE 0.4 MG/ML
.1-.4 INJECTION, SOLUTION INTRAMUSCULAR; INTRAVENOUS; SUBCUTANEOUS
Status: DISCONTINUED | OUTPATIENT
Start: 2019-11-17 | End: 2019-11-17 | Stop reason: HOSPADM

## 2019-11-17 RX ORDER — HYDROMORPHONE HYDROCHLORIDE 1 MG/ML
.3-.5 INJECTION, SOLUTION INTRAMUSCULAR; INTRAVENOUS; SUBCUTANEOUS EVERY 5 MIN PRN
Status: DISCONTINUED | OUTPATIENT
Start: 2019-11-17 | End: 2019-11-17 | Stop reason: HOSPADM

## 2019-11-17 RX ORDER — SODIUM CHLORIDE 9 MG/ML
INJECTION, SOLUTION INTRAVENOUS CONTINUOUS
Status: DISCONTINUED | OUTPATIENT
Start: 2019-11-17 | End: 2019-11-17 | Stop reason: HOSPADM

## 2019-11-17 RX ORDER — ONDANSETRON 2 MG/ML
4 INJECTION INTRAMUSCULAR; INTRAVENOUS ONCE
Status: COMPLETED | OUTPATIENT
Start: 2019-11-17 | End: 2019-11-17

## 2019-11-17 RX ORDER — MORPHINE SULFATE 4 MG/ML
4 INJECTION, SOLUTION INTRAMUSCULAR; INTRAVENOUS ONCE
Status: COMPLETED | OUTPATIENT
Start: 2019-11-17 | End: 2019-11-17

## 2019-11-17 RX ORDER — NALOXONE HYDROCHLORIDE 0.4 MG/ML
.1-.4 INJECTION, SOLUTION INTRAMUSCULAR; INTRAVENOUS; SUBCUTANEOUS
Status: CANCELLED | OUTPATIENT
Start: 2019-11-17 | End: 2019-11-18

## 2019-11-17 RX ORDER — KETOROLAC TROMETHAMINE 30 MG/ML
INJECTION, SOLUTION INTRAMUSCULAR; INTRAVENOUS PRN
Status: DISCONTINUED | OUTPATIENT
Start: 2019-11-17 | End: 2019-11-17

## 2019-11-17 RX ORDER — SCOLOPAMINE TRANSDERMAL SYSTEM 1 MG/1
PATCH, EXTENDED RELEASE TRANSDERMAL PRN
Status: DISCONTINUED | OUTPATIENT
Start: 2019-11-17 | End: 2019-11-17

## 2019-11-17 RX ORDER — SODIUM CHLORIDE, SODIUM LACTATE, POTASSIUM CHLORIDE, CALCIUM CHLORIDE 600; 310; 30; 20 MG/100ML; MG/100ML; MG/100ML; MG/100ML
INJECTION, SOLUTION INTRAVENOUS CONTINUOUS
Status: CANCELLED | OUTPATIENT
Start: 2019-11-17

## 2019-11-17 RX ORDER — SODIUM CHLORIDE, SODIUM LACTATE, POTASSIUM CHLORIDE, CALCIUM CHLORIDE 600; 310; 30; 20 MG/100ML; MG/100ML; MG/100ML; MG/100ML
INJECTION, SOLUTION INTRAVENOUS CONTINUOUS
Status: DISCONTINUED | OUTPATIENT
Start: 2019-11-17 | End: 2019-11-17 | Stop reason: HOSPADM

## 2019-11-17 RX ORDER — GLYCOPYRROLATE 0.2 MG/ML
INJECTION, SOLUTION INTRAMUSCULAR; INTRAVENOUS PRN
Status: DISCONTINUED | OUTPATIENT
Start: 2019-11-17 | End: 2019-11-17

## 2019-11-17 RX ORDER — MORPHINE SULFATE 2 MG/ML
2-4 INJECTION, SOLUTION INTRAMUSCULAR; INTRAVENOUS
Status: DISCONTINUED | OUTPATIENT
Start: 2019-11-17 | End: 2019-11-17 | Stop reason: HOSPADM

## 2019-11-17 RX ORDER — HYDROCODONE BITARTRATE AND ACETAMINOPHEN 5; 325 MG/1; MG/1
1-2 TABLET ORAL EVERY 4 HOURS PRN
Qty: 20 TABLET | Refills: 0 | Status: SHIPPED | OUTPATIENT
Start: 2019-11-17 | End: 2020-01-13

## 2019-11-17 RX ORDER — ONDANSETRON 2 MG/ML
4 INJECTION INTRAMUSCULAR; INTRAVENOUS EVERY 30 MIN PRN
Status: DISCONTINUED | OUTPATIENT
Start: 2019-11-17 | End: 2019-11-17 | Stop reason: HOSPADM

## 2019-11-17 RX ORDER — CIPROFLOXACIN 2 MG/ML
400 INJECTION, SOLUTION INTRAVENOUS ONCE
Status: COMPLETED | OUTPATIENT
Start: 2019-11-17 | End: 2019-11-17

## 2019-11-17 RX ORDER — ONDANSETRON 2 MG/ML
INJECTION INTRAMUSCULAR; INTRAVENOUS PRN
Status: DISCONTINUED | OUTPATIENT
Start: 2019-11-17 | End: 2019-11-17

## 2019-11-17 RX ORDER — FENTANYL CITRATE 50 UG/ML
INJECTION, SOLUTION INTRAMUSCULAR; INTRAVENOUS PRN
Status: DISCONTINUED | OUTPATIENT
Start: 2019-11-17 | End: 2019-11-17

## 2019-11-17 RX ORDER — ACETAMINOPHEN 10 MG/ML
INJECTION, SOLUTION INTRAVENOUS PRN
Status: DISCONTINUED | OUTPATIENT
Start: 2019-11-17 | End: 2019-11-17

## 2019-11-17 RX ORDER — PROPOFOL 10 MG/ML
INJECTION, EMULSION INTRAVENOUS PRN
Status: DISCONTINUED | OUTPATIENT
Start: 2019-11-17 | End: 2019-11-17

## 2019-11-17 RX ORDER — FENTANYL CITRATE 50 UG/ML
25-50 INJECTION, SOLUTION INTRAMUSCULAR; INTRAVENOUS
Status: DISCONTINUED | OUTPATIENT
Start: 2019-11-17 | End: 2019-11-17 | Stop reason: HOSPADM

## 2019-11-17 RX ORDER — SODIUM CHLORIDE, SODIUM LACTATE, POTASSIUM CHLORIDE, CALCIUM CHLORIDE 600; 310; 30; 20 MG/100ML; MG/100ML; MG/100ML; MG/100ML
INJECTION, SOLUTION INTRAVENOUS CONTINUOUS PRN
Status: DISCONTINUED | OUTPATIENT
Start: 2019-11-17 | End: 2019-11-17

## 2019-11-17 RX ADMIN — GLYCOPYRROLATE 0.2 MG: 0.2 INJECTION, SOLUTION INTRAMUSCULAR; INTRAVENOUS at 11:51

## 2019-11-17 RX ADMIN — PROPOFOL 200 MG: 10 INJECTION, EMULSION INTRAVENOUS at 11:47

## 2019-11-17 RX ADMIN — SCOPALAMINE 1 PATCH: 1 PATCH, EXTENDED RELEASE TRANSDERMAL at 12:15

## 2019-11-17 RX ADMIN — FENTANYL CITRATE 25 MCG: 50 INJECTION, SOLUTION INTRAMUSCULAR; INTRAVENOUS at 11:59

## 2019-11-17 RX ADMIN — MIDAZOLAM 2 MG: 1 INJECTION INTRAMUSCULAR; INTRAVENOUS at 11:45

## 2019-11-17 RX ADMIN — KETOROLAC TROMETHAMINE 30 MG: 30 INJECTION, SOLUTION INTRAMUSCULAR at 11:55

## 2019-11-17 RX ADMIN — LIDOCAINE HYDROCHLORIDE 60 MG: 20 INJECTION, SOLUTION INFILTRATION; PERINEURAL at 11:47

## 2019-11-17 RX ADMIN — ONDANSETRON HYDROCHLORIDE 4 MG: 2 INJECTION, SOLUTION INTRAMUSCULAR; INTRAVENOUS at 01:13

## 2019-11-17 RX ADMIN — PROPOFOL 50 MG: 10 INJECTION, EMULSION INTRAVENOUS at 11:59

## 2019-11-17 RX ADMIN — ONDANSETRON 4 MG: 2 INJECTION INTRAMUSCULAR; INTRAVENOUS at 11:55

## 2019-11-17 RX ADMIN — SODIUM CHLORIDE, POTASSIUM CHLORIDE, SODIUM LACTATE AND CALCIUM CHLORIDE: 600; 310; 30; 20 INJECTION, SOLUTION INTRAVENOUS at 11:44

## 2019-11-17 RX ADMIN — SODIUM CHLORIDE: 9 INJECTION, SOLUTION INTRAVENOUS at 04:30

## 2019-11-17 RX ADMIN — KETAMINE HYDROCHLORIDE 30 MG: 50 INJECTION, SOLUTION INTRAMUSCULAR; INTRAVENOUS at 11:45

## 2019-11-17 RX ADMIN — CIPROFLOXACIN 400 MG: 2 INJECTION, SOLUTION INTRAVENOUS at 10:30

## 2019-11-17 RX ADMIN — KETOROLAC TROMETHAMINE 15 MG: 15 INJECTION, SOLUTION INTRAMUSCULAR; INTRAVENOUS at 01:13

## 2019-11-17 RX ADMIN — DEXAMETHASONE SODIUM PHOSPHATE 4 MG: 4 INJECTION, SOLUTION INTRA-ARTICULAR; INTRALESIONAL; INTRAMUSCULAR; INTRAVENOUS; SOFT TISSUE at 11:55

## 2019-11-17 RX ADMIN — PROPOFOL 225 MCG/KG/MIN: 10 INJECTION, EMULSION INTRAVENOUS at 11:48

## 2019-11-17 RX ADMIN — SODIUM CHLORIDE, POTASSIUM CHLORIDE, SODIUM LACTATE AND CALCIUM CHLORIDE 100 ML/HR: 600; 310; 30; 20 INJECTION, SOLUTION INTRAVENOUS at 12:46

## 2019-11-17 RX ADMIN — KETAMINE HYDROCHLORIDE 30 MG: 50 INJECTION, SOLUTION INTRAMUSCULAR; INTRAVENOUS at 11:51

## 2019-11-17 RX ADMIN — ACETAMINOPHEN 1000 MG: 10 INJECTION, SOLUTION INTRAVENOUS at 11:55

## 2019-11-17 RX ADMIN — MORPHINE SULFATE 4 MG: 4 INJECTION, SOLUTION INTRAMUSCULAR; INTRAVENOUS at 01:13

## 2019-11-17 ASSESSMENT — MIFFLIN-ST. JEOR
SCORE: 1915.24
SCORE: 1907.98

## 2019-11-17 ASSESSMENT — ENCOUNTER SYMPTOMS
ARTHRALGIAS: 0
VOMITING: 0
ABDOMINAL PAIN: 0
HEMATURIA: 1
NAUSEA: 1
HEADACHES: 0
SORE THROAT: 0
FEVER: 0
COUGH: 0
DYSURIA: 0
DIARRHEA: 0
DIFFICULTY URINATING: 0
COLOR CHANGE: 0
FLANK PAIN: 1
SHORTNESS OF BREATH: 0
CHILLS: 0

## 2019-11-17 NOTE — DISCHARGE INSTRUCTIONS
Scopolamine Patch  A Scopalomine Patch was placed behind your *** ear.  The patch is used to help with motion sickness or nausea after surgery.  It will last up to 3 days but can be removed sooner if you are experiencing an increase in drossiness, blurred vision, or feel it is no longer needed.  When removing make sure to flush down the toilet and wash hands immediately.  Keep out of reach of children and pets.

## 2019-11-17 NOTE — OP NOTE
Preoperative diagnosis  Left kidney stone    Postoperative diagnosis  Left kidney stone    Procedure performed  Left ureteroscopy with holmium-YAG laser lithotripsy and basket extraction of stone fragments  Cystoscopy with left retrograde pyelogram and ureteral stent placement  Interpretation of retrograde    Surgeon  Veto Peralta MD    Surgeon(s)/Proceduralist(s) and Assistants (if any)  Surgeon(s):  Veto Peralta MD  Circulator: Danny Castelan RN  : Kat Martínez  First Assistant: Madelin Garcia RN    Specimen(s)  Stone analysis?  Yes    (EBL) Estimated blood loss (ml)  5    Anesthesia  General    Complications  None    Findings  Urethroscopy revealed no strictures or other abnormalities.  Cystoscopy revealed no tumors, stones or other mucosal abnormalities.    Left retrograde pyelogram revealed a delicate system with identification of the stone seen on pre-op imaging.  No other filling defects and caliber of ureter was smooth and normal.    Indications  24 year old male agreed to undergo the above named procedure after discussion of the alternatives, risks and benefits.  Informed consent was obtained.      Procedure  The patient was taken to the operating room and placed supine on the operating table.  Pre-operative antibiotics were administered.  Bilateral lower extremity SCDs were placed.  After induction of general anesthesia the patient was positioned in dorsal lithotomy, prepped and draped in a sterile fashion.  A time-out was performed.      A 14-Dutch flexible cystoscope was passed carefully via urethra into the bladder.  The left ureteral orifice was identified and a Sensor wire was passed retrograde to the level of the kidney and confirmed by fluoroscopy.  The flexible scope was off-loaded and the bladder emptied with a straight catheter.  A 5-Dutch open-ended was passed over the wire and the wire removed.  A retrograde pyelogram was performed by slowly injecting 5 mL of 50%  Omnipaque contrast via the 5-Czech catheter with findings described above.  A sensor wire was replaced and the 5-Czech removed. An 8-10 coaxial dilator was passed without difficulty.  The 8-Czech portion was removed and an Amplatz super-stiff was placed to the level of the renal pelvis confirmed by fluoroscopy. The 10-Czech dilator was then withdrawn. The Sensor wire was clipped to the drape as a safety wire.  A 11-13, 46-cm access sheath was advanced over the super-stiff wire to level of the UPJ under direct fluoroscopic guidance. The inner stylet and super-stiff wire were removed.  The kidney was entered with the Olympus URF-P6 flexible ureteroscope.  The kidney stone was identified and fragmented with a 200-micron holmium YAG laser fiber at laser settings of 0.8 joules and a frequency of 8 Hz. The fragments were basket extracted. At the completion of the procedure, all clinically significant fragments were removed and only dust-like fragments remained.  The access sheath was removed under direct vision.  Ureteral edema but no obvious obstruction was present.  A 5-Czech catheter was passed over the safety wire and the wire withdrawn.   Contrast was injected into the renal pelvis via the 5-Czech open-ended catheter.  A super-stiff wire was placed and confirmed by fluoroscopy.  A 6 x 26 Czech stent was positioned with the upper end in the upper pole and the lower in the bladder confirmed by fluoroscopy. The bladder was emptied and the procedure was complete. The patient tolerated the procedure well and was stable throughout.    Plan  Follow up in clinic for stent pull in the next week or so (Monday in 8 days)

## 2019-11-17 NOTE — CONSULTS
I was asked to see this patient by Dr Marrufo and provide my opinion about the following:  Ureteral stone    Type of Visit  Consult    Chief Complaint  Ureteral stone    HPI  Mr. Kinney is a 24 year old male who presents with left  ureteral stone  The patient initially presented to the ED overnight and admitted due to need for IV pain mgmt..  At that time the patient underwent a CT scan which revealed a 5-6 mm obstructing stone.  The patient currently denies fevers or chills.  The patient is currently experiencing nausea without vomiting.  The patient has undergone surgery in the past for stones.    Pain ROS  Location:  Left flank  Quality:    Sharp  Provocative factors:  Nothing makes it worse  Palliative factors:   Narcotics make it better  Radiation:   None  Severity:   6/10 currently and 9/10 at its worst  Time:     Pain started 3 day(s) ago      Past Medical History  He  has a past medical history of Calculus of kidney (4/27/2019).  Patient Active Problem List   Diagnosis     Calculus of kidney     Hypocitraturia     Ureteral stone with hydronephrosis       Past Surgical History  He  has a past surgical history that includes Tonsillectomy; Combined Cystoscopy, Ureteroscopy, Laser Holmium Lithotripsy Ureter(S) (Left, 3/17/2019); and Graft skin full thickness from extremity (Right).    Medications    Current Facility-Administered Medications:      ketorolac (TORADOL) injection 15 mg, 15 mg, Intravenous, Q6H PRN, Ronal Hanson MD     morphine (PF) injection 2-4 mg, 2-4 mg, Intravenous, Q2H PRN, Ronal Hanson MD     naloxone (NARCAN) injection 0.1-0.4 mg, 0.1-0.4 mg, Intravenous, Q2 Min PRN, Ronal Hanson MD     ondansetron (ZOFRAN) injection 4 mg, 4 mg, Intravenous, Q6H PRN, Ronal Hanson MD     sodium chloride (PF) 0.9% PF flush 10 mL, 10 mL, Intracatheter, q1 min prn, Enoch Marrufo MD, 10 mL at 11/17/19 0432     sodium chloride 0.9% infusion, , Intravenous, Continuous, Ronal Hanson MD,  Last Rate: 100 mL/hr at 11/17/19 0430      Allergies  Allergies   Allergen Reactions     Cefprozil      Other reaction(s): *Unknown - Childhood Rxn     Augmentin Rash     Childhood reaction       Social History  He  reports that he is a non-smoker but has been exposed to tobacco smoke. He has never used smokeless tobacco. He reports current alcohol use. He reports that he does not use drugs.  No drug abuse.    Family History  History reviewed. No pertinent family history.    Review of Systems  I personally reviewed the ROS with the patient.    Unintentional weight loss:  No  Recent fever/chills: No  Night sweats: No   Current skin rash: No   Recent hair loss: No   Heat intolerance: No   Cold intolerance: No   Chest pain: No   Palpitations: No   Shortness of breath: No  Wheezing: No   Constipation: No   Diarrhea: No   Nausea: Yes    Vomiting: No   Kidney/side pain: Yes    Back pain: No  Frequent headaches: No  Dizziness: No   Leg swelling: No   Calf pain: No    Physical Exam  Vitals:    11/17/19 0234 11/17/19 0355 11/17/19 0412 11/17/19 0829   BP: (!) 120/108 130/76 131/73 131/70   Pulse:  74     Resp: 20 18 18 18   Temp: 98.2  F (36.8  C)  98.2  F (36.8  C) 97.9  F (36.6  C)   TempSrc: Oral  Tympanic Tympanic   SpO2: 100% 98% 95% 96%   Weight:   95.1 kg (209 lb 9.6 oz)    Height:         Constitutional: Uncomfortable.  Alert and cooperative   Head: NCAT  Eyes: Conjunctivae normal  Cardiovascular: Regular rate.  Pulmonary/Chest: Respirations are even and non-labored bilaterally, no audible wheezing  Abdominal: Soft. No distension, tenderness, masses or guarding.   Neurological: A + O x 3.  Cranial Nerves II-XII grossly intact.  Extremities: ADELA x 4, Warm. No clubbing.  No cyanosis.    Skin: Pink, warm and dry.  No visible rashes noted.  Psychiatric:  Normal mood and affect  Back:  + left CVA tenderness.  - right CVA tenderness.  Genitourinary: nonpalpable bladder    Labs  Results for orders placed or performed during  the hospital encounter of 11/17/19   CBC with platelets differential     Status: Abnormal   Result Value Ref Range    WBC 11.9 (H) 4.0 - 11.0 10e9/L    RBC Count 5.86 4.4 - 5.9 10e12/L    Hemoglobin 17.3 13.3 - 17.7 g/dL    Hematocrit 50.2 40.0 - 53.0 %    MCV 86 78 - 100 fl    MCH 29.5 26.5 - 33.0 pg    MCHC 34.5 31.5 - 36.5 g/dL    RDW 13.1 10.0 - 15.0 %    Platelet Count 275 150 - 450 10e9/L    Diff Method Automated Method     % Neutrophils 65.8 %    % Lymphocytes 25.8 %    % Monocytes 6.2 %    % Eosinophils 0.8 %    % Basophils 0.5 %    % Immature Granulocytes 0.9 %    Absolute Neutrophil 7.8 1.6 - 8.3 10e9/L    Absolute Lymphocytes 3.1 0.8 - 5.3 10e9/L    Absolute Monocytes 0.7 0.0 - 1.3 10e9/L    Absolute Eosinophils 0.1 0.0 - 0.7 10e9/L    Absolute Basophils 0.1 0.0 - 0.2 10e9/L    Abs Immature Granulocytes 0.1 0 - 0.4 10e9/L   INR     Status: None   Result Value Ref Range    INR 1.05 0 - 1.3   Partial thromboplastin time     Status: None   Result Value Ref Range    PTT 31 22 - 37 sec   Basic metabolic panel     Status: Abnormal   Result Value Ref Range    Sodium 140 134 - 144 mmol/L    Potassium 3.3 (L) 3.5 - 5.1 mmol/L    Chloride 104 98 - 107 mmol/L    Carbon Dioxide 25 21 - 31 mmol/L    Anion Gap 11 3 - 14 mmol/L    Glucose 109 (H) 70 - 105 mg/dL    Urea Nitrogen 12 7 - 25 mg/dL    Creatinine 1.08 0.70 - 1.30 mg/dL    GFR Estimate 84 >60 mL/min/[1.73_m2]    GFR Estimate If Black >90 >60 mL/min/[1.73_m2]    Calcium 9.7 8.6 - 10.3 mg/dL   UA reflex to Microscopic and Culture     Status: Abnormal   Result Value Ref Range    Color Urine Red     Appearance Urine Cloudy     Glucose Urine Negative NEG^Negative mg/dL    Bilirubin Urine Negative NEG^Negative    Ketones Urine Negative NEG^Negative mg/dL    Specific Gravity Urine 1.015 1.000 - 1.030    Blood Urine Large (A) NEG^Negative    pH Urine 7.0 5.0 - 9.0 pH    Protein Albumin Urine Negative NEG^Negative mg/dL    Urobilinogen Urine 0.2 0.2 - 1.0 EU/dL     "Nitrite Urine Negative NEG^Negative    Leukocyte Esterase Urine Negative NEG^Negative    Source Midstream Urine    Urine Microscopic     Status: Abnormal   Result Value Ref Range    WBC Urine 0 - 5 OTO5^0 - 5 /HPF    RBC Urine >100 (A) OTO2^O - 2 /HPF       Imaging  CT stone study   11/17/2019  I personally reviewed and interpreted the images and report.  IMPRESSION:   1. 5 x 5 mm proximal partial obstructing left ureteral calculus causing   moderate left hydronephrosis no perinephric stranding.   2. 5.2 mm right upper pole renal stone without hydronephrosis. No bladder   calculus.     Assessment  Mr. Kinney is a 24 year old male who presents with symptomatic left  ureteral stone.    Discussed the treatment options for the left stone including trial of passage and ureteroscopy with laser lithotripsy.    After explaining the risks, benefits, and alternatives a decision was made to proceed with ureteroscopy/laser lithotripsy.    The patient was explained the specific risks of bleeding, pain, infection, and ureteral injury.    In addition, the patient was told a stent may need to be placed which could result in urinary frequency, urgency, dysuria, and flank pain with voiding.    It would require an office based procedure to remove it at a later date.    Finally, the patient was told if the stone was very impacted, that we would place a stent and return at a later date to treat the stone.      Plan  The patient was scheduled and consented for \"left ureteroscopy with holmium laser lithotripsy and stent placement\" in the OR (LMA general anesthesia).     "

## 2019-11-17 NOTE — DISCHARGE SUMMARY
[facility]    Discharge Summary  Hospitalist    Date of Admission:  11/17/2019  Date of Discharge:  11/17/2019  Discharging Provider: Enoch Marrufo  Date of Service (when I saw the patient): 11/17/19    Discharge Diagnoses   Active Problems:    Ureteral stone with hydronephrosis (11/17/2019)      History of Present Illness   Hi Kinney is a 24 year old male recurrent kidney stone former with a known left ureteral stone who presents for worsened left flank pain yesterday. Has a proximal ureteral stone causing hydronephrosis.       Hospital Course   Hi Kinney was admitted on 11/17/2019.  The following problems were addressed during his hospitalization:    Ureteral stone with hydronephrosis. Treated by Dr Peralta with left ureteroscopy with holmium-YAG laser lithotripsy and basket extraction of stone fragments and ureteral stent placement. Given hydrocodone for pain. Plan to follow up for stent pull in 8 days, Monday Nov 25    Enoch Marrufo MD    Significant Results and Procedures   As above    Pending Results   These results will be followed up by NA  Unresulted Labs Ordered in the Past 30 Days of this Admission     No orders found from 10/18/2019 to 11/18/2019.          Code Status   Full Code       Primary Care Physician   Giuseppe Harvey    Physical Exam   Temp: 98.4  F (36.9  C) Temp src: Temporal BP: 130/86 Pulse: 104 Heart Rate: 103 Resp: 18 SpO2: 100 % O2 Device: None (Room air) Oxygen Delivery: 7 LPM  Vitals:    11/17/19 0029 11/17/19 0412   Weight: 94.3 kg (208 lb) 95.1 kg (209 lb 9.6 oz)     Vital Signs with Ranges  Temp:  [96.8  F (36  C)-98.4  F (36.9  C)] 98.4  F (36.9  C)  Pulse:  [] 104  Heart Rate:  [] 103  Resp:  [10-22] 18  BP: ()/() 130/86  SpO2:  [95 %-100 %] 100 %  I/O last 3 completed shifts:  In: 226 [I.V.:226]  Out: -     See H&P    Discharge Disposition   Discharged to home  Condition at discharge: Satisfactory    Consultations  This Hospital Stay   UROLOGY IP CONSULT    Time Spent on this Encounter   I, Enoch Marrufo MD, personally saw the patient today and spent less than or equal to 30 minutes discharging this patient.    Discharge Orders      Reason for your hospital stay    Kidney stone blocking urine flow from left kidney     Activity    Your activity upon discharge: activity as tolerated     Follow-up and recommended labs and tests     Follow up with Dr. Peralta for a stent pull on Monday Nov 25     Diet    Follow this diet upon discharge: normal diet     Discharge Medications   Current Discharge Medication List      START taking these medications    Details   HYDROcodone-acetaminophen (NORCO) 5-325 MG tablet Take 1-2 tablets by mouth every 4 hours as needed for severe pain (maximum of 10 tablets per 24 hours)  Qty: 20 tablet, Refills: 0    Associated Diagnoses: Kidney stone on left side         CONTINUE these medications which have NOT CHANGED    Details   hydrOXYzine (ATARAX) 25 MG tablet Take 1 tablet (25 mg) by mouth 3 times daily as needed for itching  Qty: 60 tablet, Refills: 0    Associated Diagnoses: Anxiety      ibuprofen (ADVIL/MOTRIN) 200 MG capsule Take 400 mg by mouth every 6 hours as needed for pain            Allergies   Allergies   Allergen Reactions     Cefprozil      Other reaction(s): *Unknown - Childhood Rxn     Augmentin Rash     Childhood reaction     Data   Most Recent 3 CBC's:  Recent Labs   Lab Test 11/17/19 0055 09/21/19  1309 07/03/19 2129   WBC 11.9* 8.8 11.1*   HGB 17.3 16.5 18.3*   MCV 86 85 86    237 247      Most Recent 3 BMP's:  Recent Labs   Lab Test 11/17/19 0055 09/21/19  1309 07/03/19 2129    139 137   POTASSIUM 3.3* 3.5 3.5   CHLORIDE 104 103 103   CO2 25 25 26   BUN 12 16 11   CR 1.08 1.18 1.07   ANIONGAP 11 11 8   ALICE 9.7 9.5 9.7   * 103 95     Most Recent 2 LFT's:  Recent Labs   Lab Test 09/21/19  1309 07/03/19 2129   AST 14 18   ALT 30 47   ALKPHOS 80 89    BILITOTAL 0.5 0.4     Most Recent INR's and Anticoagulation Dosing History:  Anticoagulation Dose History     Recent Dosing and Labs Latest Ref Rng & Units 11/17/2019    INR 0 - 1.3 1.05        Most Recent 3 Troponin's:No lab results found.  Most Recent Cholesterol Panel:No lab results found.  Most Recent 6 Bacteria Isolates From Any Culture (See EPIC Reports for Culture Details):No lab results found.  Most Recent TSH, T4 and A1c Labs:No lab results found.  Results for orders placed or performed during the hospital encounter of 11/17/19   CT Abdomen Pelvis w/o Contrast    Narrative    PROCEDURE INFORMATION:   Exam: CT Abdomen And Pelvis Without Contrast   Exam date and time: 11/17/2019 2:17 AM   Clinical history: 24 years old, male; Other: Right flank pain; Additional info:   Flank pain, recurrent stone disease suspected - right     TECHNIQUE:   Imaging protocol: Computed tomography of the abdomen and pelvis without   contrast.   Radiation optimization: All CT scans at this facility use at least one of these   dose optimization techniques: automated exposure control; mA and/or kV   adjustment per patient size (includes targeted exams where dose is matched to   clinical indication); or iterative reconstruction.     COMPARISON:   CT ABDOMEN PELVIS W CONTRAST 9/21/2019 1:54 PM     FINDINGS:   Tubes, catheters and devices: Unremarkable.      Liver: Unremarkable. No mass. No intra or extrahepatic biliary dialtion.   Gallbladder and bile ducts: Unremarkable. No calcified stones. No ductal   dilation.   Pancreas: Unremarkable. No ductal dilation.   Spleen: Unremarkable. No splenomegaly.   Adrenals: Unremarkable. No mass.   Kidneys and ureters: 5 x 5 mm proximal left ureteral calculus causing moderate   left hydronephrosis no perinephric stranding. Nonobstructing right renal   lithiasis noted. Upper pole right renal stone present 5.2 mm.   Stomach and bowel: Unremarkable. No obstruction. No mucosal thickening.   Appendix:  No evidence of appendicitis.   Intraperitoneal space: Unremarkable. No free air. No significant fluid   collection.   Vasculature: No abdominal aortic aneurysm.   Lymph nodes: Unremarkable. No enlarged lymph nodes.     Bladder: Unremarkable as visualized.   Reproductive: Unremarkable as visualized.   Bones/joints: Unremarkable. No acute fracture.   Soft tissues: See Kidneys And Ureters Finding.       Impression    IMPRESSION:   1. 5 x 5 mm proximal partial obstructing left ureteral calculus causing   moderate left hydronephrosis no perinephric stranding.   2. 5.2 mm right upper pole renal stone without hydronephrosis. No bladder   calculus.     THIS DOCUMENT HAS BEEN ELECTRONICALLY SIGNED BY GEOVANNA PIMENTEL MD

## 2019-11-17 NOTE — PLAN OF CARE
Pt VS Temp: 98.2  F (36.8  C) Temp src: Tympanic BP: 131/73 Pulse: 74 Heart Rate: 95 Resp: 18 SpO2: 95 % O2 Device: None (Room air)    A&O X4 and pleasant. Was having severe pain in ED and given interventions- currently has no pain.  Lungs are clear with regular heart rhythm. Skin intact. Bowel sounds active and abdomen soft/non-tender. Urinating without difficulty. Independent in room. Fluids running 100 mL/hr. Gabriella Lopez RN on 11/17/2019 at 4:50 AM

## 2019-11-17 NOTE — PHARMACY-CONSULT NOTE
Pharmacy - Transfer Medication Reconciliation     The patient's transfer medication orders have been compared to the medication administration record and to the Prior to Admissions Medications list - any noted discrepancies were resolved with the MD.     Thank you. Pharmacy will continue to monitor.     Estrella Bond RPH ....................  11/17/2019   1:50 PM

## 2019-11-17 NOTE — ANESTHESIA CARE TRANSFER NOTE
Patient: Hi Kinney    Procedure(s):  COMBINED CYSTOSCOPY, URETEROSCOPY, LASER HOLMIUM LITHOTRIPSY URETER(S)    Diagnosis: Kidney stone [N20.0]  Diagnosis Additional Information: No value filed.    Anesthesia Type:   General, LMA     Note:  Airway :LMA and T-piece  Patient transferred to:PACU  Handoff Report: Identifed the Patient, Identified the Reponsible Provider, Reviewed the pertinent medical history, Discussed the surgical course, Reviewed Intra-OP anesthesia mangement and issues during anesthesia, Set expectations for post-procedure period and Allowed opportunity for questions and acknowledgement of understanding      Vitals: (Last set prior to Anesthesia Care Transfer)    CRNA VITALS  11/17/2019 1155 - 11/17/2019 1227      11/17/2019             Pulse:  72    Ht Rate:  72    SpO2:  97 %    Resp Rate (set):  10                Electronically Signed By: JASMINE Easton CRNA  November 17, 2019  12:27 PM

## 2019-11-17 NOTE — PROGRESS NOTES
"0314/0314-01  Hi Kinney 24 year old male   Admission date:11/17/2019   Residence: Home with friends.   Code status: No Order   Isolation:No active isolations   Principal Problem: Ureteral stone with hydronephrosis  Post Op Day #: NA  Peds:No  [place]: NA  Diet: NPO  Mobility Status: Independent.   Discharge timeline & plan: unsure of discharge date and/or discharge needs at this time.  Vital signs:  Temp: 98.2  F (36.8  C) Temp src: Tympanic BP: 131/73 Pulse: 74 Heart Rate: 95 Resp: 18 SpO2: 95 % O2 Device: None (Room air)   Height: 172.7 cm (5' 8\") Weight: 95.1 kg (209 lb 9.6 oz)  Estimated body mass index is 31.87 kg/m  as calculated from the following:    Height as of this encounter: 1.727 m (5' 8\").    Weight as of this encounter: 95.1 kg (209 lb 9.6 oz).  Abnormal Physical Assessment: previous severe abdominal pain in ED (controlled thus far). [place] urine- per report.    Last Pain/PRN Medication given: None  Finger stick POCT blood sugars: NA  Labs: NA  Telemetry: No  Pending tests/procedures planned: NA  Comments:      SAFETY CHECKLIST  Arm Bands/Risk clasps correct and in place (DNR, Fall risk, Allergy, Latex, Limb):  Yes  IVF and rate ordered correct: Yes  Physical assessment verification(IV site, wounds, dressing intact, incisions, LDA's, neuro, CIWA...): Yes  Environmental assessment (bed/chair alarm on, call light, side rails, restraints, sitter....): Yes  Whiteboard updated by oncoming RN:Yes    Gabriella Lopez RN on 11/17/2019 at 5:37 AM      Celena Schmidt RN on 11/17/2019 at 7:20 AM  Bedside Handoff complete, safety checks verified by writer and are correct.          "

## 2019-11-17 NOTE — H&P
[facility]    History and Physical  Hospitalist       Date of Admission:  11/17/2019    Assessment & Plan   Hi Kinney is a 24 year old male who presents with left hydronephrosis from a partially obstructing proximal ureteral stone.    Active Problems:    Ureteral stone with hydronephrosis    Assessment: stone present previously, now with hydronephrosis    Plan: NPO   IVF   Toradol and morphine for pain   Urology consulted   To OR today with Dr Peralta    DVT Prophylaxis: Low Risk/Ambulatory with no VTE prophylaxis indicated  Code Status: Full Code    Enoch Marrufo    Primary Care Physician   Giuseppe Harvey    Chief Complaint   Left flank pain    History is obtained from the patient and chart review.    History of Present Illness   Hi Kinney is a 24 year old male recurrent kidney stone former with a known left ureteral stone who presents for worsened left flank pain yesterday.     Past Medical History    I have reviewed this patient's medical history and updated it with pertinent information if needed.   Past Medical History:   Diagnosis Date     Calculus of kidney 4/27/2019       Past Surgical History   I have reviewed this patient's surgical history and updated it with pertinent information if needed.  Past Surgical History:   Procedure Laterality Date     COMBINED CYSTOSCOPY, URETEROSCOPY, LASER HOLMIUM LITHOTRIPSY URETER(S) Left 3/17/2019    Procedure: COMBINED CYSTOSCOPY, URETEROSCOPY, LASER HOLMIUM LITHOTRIPSY URETER(S)and stent placement;  Surgeon: Veto Peralta MD;  Location: [facility] OR     GRAFT SKIN FULL THICKNESS FROM EXTREMITY Right     right calf     TONSILLECTOMY      No Comments Provided       Prior to Admission Medications   Prior to Admission Medications   Prescriptions Last Dose Informant Patient Reported? Taking?   hydrOXYzine (ATARAX) 25 MG tablet Past Week at AM Self No Yes   Sig: Take 1 tablet (25 mg) by mouth 3 times daily as needed for itching   ibuprofen  (ADVIL/MOTRIN) 200 MG capsule 11/16/2019 at PM Self Yes Yes   Sig: Take 400 mg by mouth every 6 hours as needed for pain       Facility-Administered Medications: None     Allergies   Allergies   Allergen Reactions     Cefprozil      Other reaction(s): *Unknown - Childhood Rxn     Augmentin Rash     Childhood reaction       Social History   I have reviewed this patient's social history and updated it with pertinent information if needed. Hi Kinney  reports that he is a non-smoker but has been exposed to tobacco smoke. He has never used smokeless tobacco. He reports current alcohol use. He reports that he does not use drugs.    Family History   I have reviewed this patient's family history and updated it with pertinent information if needed.   History reviewed. No pertinent family history.    Review of Systems     REVIEW OF SYSTEMS:    General: Denies general constitutional problems  Eyes: Denies problems  Ears/Nose/Throat: Denies problems  Cardiovascular: Denies problems  Respiratory: Denies problems  Gastrointestinal: Denies problems  Musculoskeletal: Denies problems  Skin: Denies problems  Neurologic: Denies problems  Psychiatric: Denies problems      Physical Exam   Temp: 98.2  F (36.8  C) Temp src: Tympanic BP: 131/73 Pulse: 74 Heart Rate: 95 Resp: 18 SpO2: 95 % O2 Device: None (Room air)    Vital Signs with Ranges  Temp:  [97.6  F (36.4  C)-98.2  F (36.8  C)] 98.2  F (36.8  C)  Pulse:  [74] 74  Heart Rate:  [] 95  Resp:  [18-20] 18  BP: (120-145)/() 131/73  SpO2:  [95 %-100 %] 95 %  209 lbs 9.6 oz    General Appearance: Pleasant, alert, appropriate appearance for age. No acute distress  OroPharynx Exam: Normal pharynx.  Neck Exam: Supple, no masses or nodes.  Chest/Respiratory Exam: Normal chest wall and respirations. Clear to auscultation.  Cardiovascular Exam: Regular rate and rhythm. S1, S2, no murmur, click, gallop, or rubs.  Gastrointestinal Exam: Soft, nontender, no abnormal masses or  organomegaly.  Musculoskeletal: Left CVA tenderness  Extremities: 2+ pedal pulses.  No lower extremity edema.  Neuro Exam: Alert, oriented x 3. CN II-XI intact. Reflexes 2+, strength symmetric upper and lower extremities  Psychiatric: Normal affect and mentation    Data   Data reviewed today:  I personally reviewed the CT image(s) showing an obstructing 7-8 mm stone in left proximal ureter.  Recent Labs   Lab 11/17/19  0055   WBC 11.9*   HGB 17.3   MCV 86      INR 1.05      POTASSIUM 3.3*   CHLORIDE 104   CO2 25   BUN 12   CR 1.08   ANIONGAP 11   ALICE 9.7   *       Recent Results (from the past 24 hour(s))   CT Abdomen Pelvis w/o Contrast    Narrative    PROCEDURE INFORMATION:   Exam: CT Abdomen And Pelvis Without Contrast   Exam date and time: 11/17/2019 2:17 AM   Clinical history: 24 years old, male; Other: Right flank pain; Additional info:   Flank pain, recurrent stone disease suspected - right     TECHNIQUE:   Imaging protocol: Computed tomography of the abdomen and pelvis without   contrast.   Radiation optimization: All CT scans at this facility use at least one of these   dose optimization techniques: automated exposure control; mA and/or kV   adjustment per patient size (includes targeted exams where dose is matched to   clinical indication); or iterative reconstruction.     COMPARISON:   CT ABDOMEN PELVIS W CONTRAST 9/21/2019 1:54 PM     FINDINGS:   Tubes, catheters and devices: Unremarkable.      Liver: Unremarkable. No mass. No intra or extrahepatic biliary dialtion.   Gallbladder and bile ducts: Unremarkable. No calcified stones. No ductal   dilation.   Pancreas: Unremarkable. No ductal dilation.   Spleen: Unremarkable. No splenomegaly.   Adrenals: Unremarkable. No mass.   Kidneys and ureters: 5 x 5 mm proximal left ureteral calculus causing moderate   left hydronephrosis no perinephric stranding. Nonobstructing right renal   lithiasis noted. Upper pole right renal stone present 5.2  mm.   Stomach and bowel: Unremarkable. No obstruction. No mucosal thickening.   Appendix: No evidence of appendicitis.   Intraperitoneal space: Unremarkable. No free air. No significant fluid   collection.   Vasculature: No abdominal aortic aneurysm.   Lymph nodes: Unremarkable. No enlarged lymph nodes.     Bladder: Unremarkable as visualized.   Reproductive: Unremarkable as visualized.   Bones/joints: Unremarkable. No acute fracture.   Soft tissues: See Kidneys And Ureters Finding.       Impression    IMPRESSION:   1. 5 x 5 mm proximal partial obstructing left ureteral calculus causing   moderate left hydronephrosis no perinephric stranding.   2. 5.2 mm right upper pole renal stone without hydronephrosis. No bladder   calculus.     THIS DOCUMENT HAS BEEN ELECTRONICALLY SIGNED BY GEOVANNA PIMENTEL MD

## 2019-11-17 NOTE — PROGRESS NOTES
"NS ADMISSION NOTE    Patient admitted to room 314 at approximately 0411 via wheel chair from emergency room. Patient was accompanied by nurse and friends.     Verbal SBAR report received from QUIQUE Wu prior to patient arrival.     Patient ambulated to bed independently. Patient alert and oriented X 3. The patient is not having any pain. 0-10 Pain Scale: 6. Admission vital signs: Blood pressure 131/73, pulse 74, temperature 98.2  F (36.8  C), temperature source Tympanic, resp. rate 18, height 1.727 m (5' 8\"), weight 95.1 kg (209 lb 9.6 oz), SpO2 95 %. Patient was oriented to plan of care, call light, bed controls, tv, telephone, bathroom and visiting hours.     Risk Assessment    The following safety risks were identified during admission: allergies. Red risk band applied: YES.     Education    Patient has a [facility] to Observation order: Yes  Observation education completed and documented: Yes- was done in ED.       Gabriella Lopez RN    "

## 2019-11-17 NOTE — ED TRIAGE NOTES
Pt presents to the ER with hematuria since 1700 today.  Denies clots in the urine.  He is scheduled to have surgery with Dr. Peralta in two weeks to remove kidney stones.  He feels that the stones have moved.  Denies fever.  Flank pain on left side.

## 2019-11-17 NOTE — OR NURSING
PACU Transfer Note    Hi Kinney was transferred to [facility] via cart.  Equipment used for transport:  none.  Accompanied by:  RN report to Celena  Prescriptions were: sent with    PACU Respiratory Event Documentation     1) Episodes of Apnea greater than or equal to 10 seconds: no    2) Bradypnea - less than 8 breaths per minute: no    3) Pain score on 0 to 10 scale: none    4) Pain-sedation mismatch (yes or no): no    5) Repeated 02 desaturation less than 90% (yes or no): no    Anesthesia notified? (yes or no): no    Any of the above events occuring repeatedly in separate 30 minute intervals may be considered recurrent PACU respiratory events.    Patient stable and meets phase 1 discharge criteria for transport from PACU.

## 2019-11-17 NOTE — PROGRESS NOTES
Discharge Note    Data:  Hi Kinney has been Discharged to home at 1445 via ambulation accompanied by Family.      Action:  Written discharge/follow-up instructions were provided to other:Pt. Prescriptions: were written and sent with patient. Belongings sent with patient.Equipment None.     Response:  other:Pt verbalized understanding of discharge instructions, reason for discharge, and necessary follow-up appointments.

## 2019-11-17 NOTE — ED PROVIDER NOTES
History     Chief Complaint   Patient presents with     Kidney Problem     HPI  Hi Kinney is a 24 year old male who presents to the emergency room complaining of left-sided flank pain. Patient reports the pain began approximately 5 hours prior to arrival. The pain is located in the left flank area with radiation to the left lower quadrant intermittently and described as sharp, 9 out of 10 for severity. The pain is exacerbated by movement and relieved by thing. The patient complains of associated nausea and hematuria. Denies associated fever, sweats, chills, chest pain, cough, shortness of breath, abdominal pain, vomiting, diarrhea, dysuria.  He has a history of similar pain due to kidney stones.  He follows with Dr. Peralta of urology and is scheduled to have a lithotripsy done on December 3, 2019.. Patient denies other complaints.    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 No pertinent past medical history.    Past Surgical History:    Past Surgical History:   Procedure Laterality Date     COMBINED CYSTOSCOPY, URETEROSCOPY, LASER HOLMIUM LITHOTRIPSY URETER(S) Left 3/17/2019    Procedure: COMBINED CYSTOSCOPY, URETEROSCOPY, LASER HOLMIUM LITHOTRIPSY URETER(S)and stent placement;  Surgeon: Veto Peralta MD;  Location:  OR     OTHER SURGICAL HISTORY      [id number],SKIN GRAFT,Right,calf     TONSILLECTOMY      No Comments Provided       Family History:    History reviewed. No pertinent family history.    Social History:  Marital Status:  Single [1]  Social History     Tobacco Use     Smoking status: Passive Smoke  "Exposure - Never Smoker     Smokeless tobacco: Never Used     Tobacco comment: Quit smoking: family members smoke   Substance Use Topics     Alcohol use: Yes     Comment: rare     Drug use: No        Medications:    hydrOXYzine (ATARAX) 25 MG tablet          Review of Systems   Constitutional: Negative for chills and fever.   HENT: Negative for congestion and sore throat.    Respiratory: Negative for cough and shortness of breath.    Cardiovascular: Negative for chest pain.   Gastrointestinal: Positive for nausea. Negative for abdominal pain, diarrhea and vomiting.   Genitourinary: Positive for flank pain and hematuria. Negative for difficulty urinating, dysuria and urgency.   Musculoskeletal: Negative for arthralgias.   Skin: Negative for color change.   Neurological: Negative for headaches.   All other systems reviewed and are negative.      Physical Exam   BP: (!) 145/90  Heart Rate: 108  Temp: 97.6  F (36.4  C)  Resp: 20  Height: 172.7 cm (5' 8\")  Weight: 94.3 kg (208 lb)  SpO2: 97 %      Physical Exam  Vitals signs and nursing note reviewed.   Constitutional:       General: He is not in acute distress.     Appearance: He is well-developed. He is not diaphoretic.   HENT:      Head: Normocephalic and atraumatic.      Right Ear: External ear normal.      Left Ear: External ear normal.      Nose: Nose normal.      Mouth/Throat:      Lips: Pink.      Mouth: Mucous membranes are moist.      Pharynx: Oropharynx is clear.   Eyes:      Extraocular Movements: Extraocular movements intact.      Conjunctiva/sclera: Conjunctivae normal.      Pupils: Pupils are equal, round, and reactive to light.   Neck:      Musculoskeletal: Full passive range of motion without pain, normal range of motion and neck supple.      Thyroid: No thyromegaly.   Cardiovascular:      Rate and Rhythm: Normal rate and regular rhythm.      Heart sounds: Normal heart sounds, S1 normal and S2 normal. No murmur.   Pulmonary:      Effort: Pulmonary effort " is normal.      Breath sounds: Normal breath sounds. No decreased breath sounds.   Abdominal:      General: Bowel sounds are normal.      Palpations: Abdomen is soft.      Tenderness: There is no abdominal tenderness. There is left CVA tenderness. There is no right CVA tenderness, guarding or rebound. Negative signs include Spence's sign and McBurney's sign.   Musculoskeletal: Normal range of motion.         General: No tenderness.      Right lower leg: No edema.      Left lower leg: No edema.   Lymphadenopathy:      Cervical: No cervical adenopathy.   Skin:     General: Skin is warm.      Capillary Refill: Capillary refill takes less than 2 seconds.   Neurological:      Mental Status: He is alert and oriented to person, place, and time.   Psychiatric:         Mood and Affect: Mood normal.         Behavior: Behavior normal. Behavior is cooperative.         ED Course     Procedures       Critical Care time:  none  Results for orders placed or performed during the hospital encounter of 11/17/19 (from the past 24 hour(s))   CBC with platelets differential   Result Value Ref Range    WBC 11.9 (H) 4.0 - 11.0 10e9/L    RBC Count 5.86 4.4 - 5.9 10e12/L    Hemoglobin 17.3 13.3 - 17.7 g/dL    Hematocrit 50.2 40.0 - 53.0 %    MCV 86 78 - 100 fl    MCH 29.5 26.5 - 33.0 pg    MCHC 34.5 31.5 - 36.5 g/dL    RDW 13.1 10.0 - 15.0 %    Platelet Count 275 150 - 450 10e9/L    Diff Method Automated Method     % Neutrophils 65.8 %    % Lymphocytes 25.8 %    % Monocytes 6.2 %    % Eosinophils 0.8 %    % Basophils 0.5 %    % Immature Granulocytes 0.9 %    Absolute Neutrophil 7.8 1.6 - 8.3 10e9/L    Absolute Lymphocytes 3.1 0.8 - 5.3 10e9/L    Absolute Monocytes 0.7 0.0 - 1.3 10e9/L    Absolute Eosinophils 0.1 0.0 - 0.7 10e9/L    Absolute Basophils 0.1 0.0 - 0.2 10e9/L    Abs Immature Granulocytes 0.1 0 - 0.4 10e9/L   INR   Result Value Ref Range    INR 1.05 0 - 1.3   Partial thromboplastin time   Result Value Ref Range    PTT 31 22 - 37  sec   Basic metabolic panel   Result Value Ref Range    Sodium 140 134 - 144 mmol/L    Potassium 3.3 (L) 3.5 - 5.1 mmol/L    Chloride 104 98 - 107 mmol/L    Carbon Dioxide 25 21 - 31 mmol/L    Anion Gap 11 3 - 14 mmol/L    Glucose 109 (H) 70 - 105 mg/dL    Urea Nitrogen 12 7 - 25 mg/dL    Creatinine 1.08 0.70 - 1.30 mg/dL    GFR Estimate 84 >60 mL/min/[1.73_m2]    GFR Estimate If Black >90 >60 mL/min/[1.73_m2]    Calcium 9.7 8.6 - 10.3 mg/dL   CT Abdomen Pelvis w/o Contrast    Narrative    PROCEDURE INFORMATION:   Exam: CT Abdomen And Pelvis Without Contrast   Exam date and time: 11/17/2019 2:17 AM   Clinical history: 24 years old, male; Other: Right flank pain; Additional info:   Flank pain, recurrent stone disease suspected - right     TECHNIQUE:   Imaging protocol: Computed tomography of the abdomen and pelvis without   contrast.   Radiation optimization: All CT scans at this facility use at least one of these   dose optimization techniques: automated exposure control; mA and/or kV   adjustment per patient size (includes targeted exams where dose is matched to   clinical indication); or iterative reconstruction.     COMPARISON:   CT ABDOMEN PELVIS W CONTRAST 9/21/2019 1:54 PM     FINDINGS:   Tubes, catheters and devices: Unremarkable.      Liver: Unremarkable. No mass. No intra or extrahepatic biliary dialtion.   Gallbladder and bile ducts: Unremarkable. No calcified stones. No ductal   dilation.   Pancreas: Unremarkable. No ductal dilation.   Spleen: Unremarkable. No splenomegaly.   Adrenals: Unremarkable. No mass.   Kidneys and ureters: 5 x 5 mm proximal left ureteral calculus causing moderate   left hydronephrosis no perinephric stranding. Nonobstructing right renal   lithiasis noted. Upper pole right renal stone present 5.2 mm.   Stomach and bowel: Unremarkable. No obstruction. No mucosal thickening.   Appendix: No evidence of appendicitis.   Intraperitoneal space: Unremarkable. No free air. No significant  fluid   collection.   Vasculature: No abdominal aortic aneurysm.   Lymph nodes: Unremarkable. No enlarged lymph nodes.     Bladder: Unremarkable as visualized.   Reproductive: Unremarkable as visualized.   Bones/joints: Unremarkable. No acute fracture.   Soft tissues: See Kidneys And Ureters Finding.       Impression    IMPRESSION:   1. 5 x 5 mm proximal partial obstructing left ureteral calculus causing   moderate left hydronephrosis no perinephric stranding.   2. 5.2 mm right upper pole renal stone without hydronephrosis. No bladder   calculus.     THIS DOCUMENT HAS BEEN ELECTRONICALLY SIGNED BY EGOVANNA PIMENTEL MD   UA reflex to Microscopic and Culture   Result Value Ref Range    Color Urine Red     Appearance Urine Cloudy     Glucose Urine Negative NEG^Negative mg/dL    Bilirubin Urine Negative NEG^Negative    Ketones Urine Negative NEG^Negative mg/dL    Specific Gravity Urine 1.015 1.000 - 1.030    Blood Urine Large (A) NEG^Negative    pH Urine 7.0 5.0 - 9.0 pH    Protein Albumin Urine Negative NEG^Negative mg/dL    Urobilinogen Urine 0.2 0.2 - 1.0 EU/dL    Nitrite Urine Negative NEG^Negative    Leukocyte Esterase Urine Negative NEG^Negative    Source Midstream Urine    Urine Microscopic   Result Value Ref Range    WBC Urine 0 - 5 OTO5^0 - 5 /HPF    RBC Urine >100 (A) OTO2^O - 2 /HPF       Medications   sodium chloride 0.9% infusion (has no administration in time range)   naloxone (NARCAN) injection 0.1-0.4 mg (has no administration in time range)   morphine (PF) injection 2-4 mg (has no administration in time range)   ondansetron (ZOFRAN) injection 4 mg (has no administration in time range)   ketorolac (TORADOL) injection 15 mg (has no administration in time range)   ondansetron (ZOFRAN) injection 4 mg (4 mg Intravenous Given 11/17/19 0113)   morphine (PF) injection 4 mg (4 mg Intravenous Given 11/17/19 0113)   ketorolac (TORADOL) injection 15 mg (15 mg Intravenous Given 11/17/19 0113)       Assessments & Plan  (with Medical Decision Making)     I have reviewed the nursing notes.  Labs are reviewed as above and are remarkable for UA with positive RBCs.  No signs of infection.  Remaining labs are unremarkable.    CT scan of the abdomen pelvis results reviewed as above.  Findings are consistent with 5 x 5 mm stone in the proximal left ureter with hydronephrosis.    The patient is treated as above and is feeling much better.  The patient had called Dr. Peralta earlier this evening when his symptoms started and was instructed to come to the emergency room.  Patient told me that Dr. Peralta would like for him to have labs, CT scan and then be called.    I did try to call Dr. Peralta but he is not answering his phone and also is not on call tonight.  Patient states that he had wanted to be called even though he is not formally on the call schedule.    Patient was discussed with very kindly accepted by Dr. Marrufo for admission.    I had a discussion with the patient and the family regarding the symptoms, exam, laboratory, CT Scan results, diagnosis, and plan.    I answered all questions to the best of my ability.    The patient voiced understanding of the plan and was agreeable.    New Prescriptions    No medications on file       Final diagnoses:   Hydronephrosis with urinary obstruction due to ureteral calculus       11/17/2019   [facility]     Ronal Hanson MD  11/17/19 1884

## 2019-11-17 NOTE — ANESTHESIA PREPROCEDURE EVALUATION
Anesthesia Pre-Procedure Evaluation    Patient: Hi Kinney   MRN: [id number] : 1995          Preoperative Diagnosis: Kidney stone [N20.0]    Procedure(s):  COMBINED CYSTOSCOPY, URETEROSCOPY, LASER HOLMIUM LITHOTRIPSY URETER(S)    Past Medical History:   Diagnosis Date     Calculus of kidney 2019     Past Surgical History:   Procedure Laterality Date     COMBINED CYSTOSCOPY, URETEROSCOPY, LASER HOLMIUM LITHOTRIPSY URETER(S) Left 3/17/2019    Procedure: COMBINED CYSTOSCOPY, URETEROSCOPY, LASER HOLMIUM LITHOTRIPSY URETER(S)and stent placement;  Surgeon: Veto Peralta MD;  Location: [facility] OR     GRAFT SKIN FULL THICKNESS FROM EXTREMITY Right     right calf     TONSILLECTOMY      No Comments Provided       Anesthesia Evaluation     . Pt has had prior anesthetic.     History of anesthetic complications   - PONV        ROS/MED HX    ENT/Pulmonary:  - neg pulmonary ROS     Neurologic:  - neg neurologic ROS     Cardiovascular:  - neg cardiovascular ROS       METS/Exercise Tolerance:     Hematologic:  - neg hematologic  ROS       Musculoskeletal:  - neg musculoskeletal ROS       GI/Hepatic:  - neg GI/hepatic ROS       Renal/Genitourinary:     (+) Nephrolithiasis ,       Endo:  - neg endo ROS       Psychiatric:  - neg psychiatric ROS       Infectious Disease:  - neg infectious disease ROS       Malignancy:      - no malignancy   Other:    - neg other ROS                      Physical Exam  Normal systems: cardiovascular, pulmonary and dental    Airway   Mallampati: I  TM distance: >3 FB  Neck ROM: full    Dental     Cardiovascular   Rhythm and rate: regular and normal      Pulmonary    breath sounds clear to auscultation            Lab Results   Component Value Date    WBC 11.9 (H) 2019    HGB 17.3 2019    HCT 50.2 2019     2019     2019    POTASSIUM 3.3 (L) 2019    CHLORIDE 104 2019    CO2 25 2019    BUN 12 2019    CR 1.08 2019      "(H) 11/17/2019    ALICE 9.7 11/17/2019    PHOS 1.8 (L) 03/25/2019    MAG 1.9 03/25/2019    ALBUMIN 4.4 09/21/2019    PROTTOTAL 6.6 09/21/2019    ALT 30 09/21/2019    AST 14 09/21/2019    ALKPHOS 80 09/21/2019    BILITOTAL 0.5 09/21/2019    LIPASE 9 (L) 07/03/2019    PTT 31 11/17/2019    INR 1.05 11/17/2019       Preop Vitals  BP Readings from Last 3 Encounters:   11/17/19 131/70   11/02/19 104/68   09/23/19 122/78    Pulse Readings from Last 3 Encounters:   11/17/19 74   11/02/19 100   09/23/19 68      Resp Readings from Last 3 Encounters:   11/17/19 18   11/02/19 20   09/23/19 16    SpO2 Readings from Last 3 Encounters:   11/17/19 96%   09/21/19 96%   07/03/19 98%      Temp Readings from Last 1 Encounters:   11/17/19 97.9  F (36.6  C) (Tympanic)    Ht Readings from Last 1 Encounters:   11/17/19 1.727 m (5' 8\")      Wt Readings from Last 1 Encounters:   11/17/19 95.1 kg (209 lb 9.6 oz)    Estimated body mass index is 31.87 kg/m  as calculated from the following:    Height as of this encounter: 1.727 m (5' 8\").    Weight as of this encounter: 95.1 kg (209 lb 9.6 oz).       Anesthesia Plan      History & Physical Review      ASA Status:  1 emergent.    NPO Status:  > 8 hours    Plan for General and LMA with Intravenous and Propofol induction. Maintenance will be TIVA.           Postoperative Care      Consents  Anesthetic plan, risks, benefits and alternatives discussed with:  Patient..                 JASMINE Easton CRNA  "

## 2019-11-17 NOTE — ANESTHESIA POSTPROCEDURE EVALUATION
Patient: Hi Kinney    Procedure(s):  COMBINED CYSTOSCOPY, URETEROSCOPY, LASER HOLMIUM LITHOTRIPSY URETER(S)    Diagnosis:Kidney stone [N20.0]  Diagnosis Additional Information: No value filed.    Anesthesia Type:  General, LMA    Note:  Anesthesia Post Evaluation    Patient location during evaluation: PACU  Patient participation: Able to fully participate in evaluation  Level of consciousness: awake and alert  Pain management: adequate  Airway patency: patent  Cardiovascular status: acceptable  Respiratory status: acceptable  Hydration status: acceptable  PONV: none     Anesthetic complications: None          Last vitals:  Vitals:    11/17/19 1305 11/17/19 1334 11/17/19 1410   BP: (!) 134/94 (!) 140/89 129/88   Pulse: 102     Resp: 14 16 16   Temp: 98.7  F (37.1  C) 97.1  F (36.2  C) 97.7  F (36.5  C)   SpO2: 98% 97% 95%         Electronically Signed By: JASMINE Easton CRNA  November 17, 2019  3:07 PM

## 2019-11-17 NOTE — PROGRESS NOTES
OBS video watched and handout was given.  All questions answered.    Jinny Melendez RN on 11/17/2019 at 3:34 AM

## 2019-11-17 NOTE — PLAN OF CARE
"Pt voided a total of 1,000 mL of Pink urine, no clots noted. Pt voided without issues. Pt tolerated jello and water, denied nausea. Pt steady on feet and walking. VS WDL /88   Pulse 102   Temp 97.7  F (36.5  C) (Tympanic)   Resp 16   Ht 1.727 m (5' 8\")   Wt 95.1 kg (209 lb 9.6 oz)   SpO2 95%   BMI 31.87 kg/m     "

## 2019-11-17 NOTE — PHARMACY-ADMISSION MEDICATION HISTORY
Pharmacy -- Admission Medication Reconciliation    Prior to admission (PTA) medications were reviewed and the patient's PTA medication list was updated.    Sources Consulted: Patient Interview, Sure Scripts    The reliability of this Medication Reconciliation is: Reliability: Reliable    The following significant changes were made:    Added IBU prn    Removed bactrim--finished ten day course 11/12 (patient reports completing 9/10 days)    In addition, the patient's allergies were reviewed with the patient and updated as follows:   Allergies: Cefprozil and Augmentin    The pharmacist has reviewed with the patient that all personal medications should be removed from the building or locked in the belongings safe.  Patient shall only take medications ordered by the physician and administered by the nursing staff.       Medication barriers identified: none   Medication adherence concerns: none   Understanding of emergency medications: YOHANA Bond RPH, 11/17/2019,  8:23 AM

## 2019-11-21 ENCOUNTER — TELEPHONE (OUTPATIENT)
Dept: UROLOGY | Facility: OTHER | Age: 24
End: 2019-11-21

## 2019-11-21 ENCOUNTER — APPOINTMENT (OUTPATIENT)
Dept: GENERAL RADIOLOGY | Facility: OTHER | Age: 24
End: 2019-11-21
Attending: PHYSICIAN ASSISTANT
Payer: COMMERCIAL

## 2019-11-21 ENCOUNTER — HOSPITAL ENCOUNTER (EMERGENCY)
Facility: OTHER | Age: 24
Discharge: HOME | End: 2019-11-21
Attending: PHYSICIAN ASSISTANT | Admitting: PHYSICIAN ASSISTANT
Payer: COMMERCIAL

## 2019-11-21 VITALS
RESPIRATION RATE: 16 BRPM | WEIGHT: 209 LBS | HEART RATE: 98 BPM | BODY MASS INDEX: 31.78 KG/M2 | SYSTOLIC BLOOD PRESSURE: 126 MMHG | DIASTOLIC BLOOD PRESSURE: 67 MMHG | TEMPERATURE: 99.8 F | OXYGEN SATURATION: 96 %

## 2019-11-21 DIAGNOSIS — R10.13 EPIGASTRIC PAIN: ICD-10-CM

## 2019-11-21 DIAGNOSIS — R50.9 FEVER: ICD-10-CM

## 2019-11-21 LAB
ALBUMIN SERPL-MCNC: 4.5 G/DL (ref 3.5–5.7)
ALBUMIN UR-MCNC: 30 MG/DL
ALP SERPL-CCNC: 80 U/L (ref 34–104)
ALT SERPL W P-5'-P-CCNC: 29 U/L (ref 7–52)
ANION GAP SERPL CALCULATED.3IONS-SCNC: 11 MMOL/L (ref 3–14)
APPEARANCE UR: CLEAR
AST SERPL W P-5'-P-CCNC: 15 U/L (ref 13–39)
BASOPHILS # BLD AUTO: 0 10E9/L (ref 0–0.2)
BASOPHILS NFR BLD AUTO: 0.2 %
BILIRUB SERPL-MCNC: 0.9 MG/DL (ref 0.3–1)
BILIRUB UR QL STRIP: NEGATIVE
BUN SERPL-MCNC: 12 MG/DL (ref 7–25)
CALCIUM SERPL-MCNC: 8.9 MG/DL (ref 8.6–10.3)
CHLORIDE SERPL-SCNC: 103 MMOL/L (ref 98–107)
CO2 SERPL-SCNC: 21 MMOL/L (ref 21–31)
COLOR UR AUTO: ABNORMAL
CREAT SERPL-MCNC: 0.95 MG/DL (ref 0.7–1.3)
DIFFERENTIAL METHOD BLD: NORMAL
EOSINOPHIL # BLD AUTO: 0 10E9/L (ref 0–0.7)
EOSINOPHIL NFR BLD AUTO: 0.1 %
ERYTHROCYTE [DISTWIDTH] IN BLOOD BY AUTOMATED COUNT: 12.9 % (ref 10–15)
GFR SERPL CREATININE-BSD FRML MDRD: >90 ML/MIN/{1.73_M2}
GLUCOSE SERPL-MCNC: 102 MG/DL (ref 70–105)
GLUCOSE UR STRIP-MCNC: NEGATIVE MG/DL
HCT VFR BLD AUTO: 47.2 % (ref 40–53)
HGB BLD-MCNC: 16.2 G/DL (ref 13.3–17.7)
HGB UR QL STRIP: ABNORMAL
IMM GRANULOCYTES # BLD: 0.1 10E9/L (ref 0–0.4)
IMM GRANULOCYTES NFR BLD: 1.1 %
KETONES UR STRIP-MCNC: 15 MG/DL
LACTATE SERPL-SCNC: 0.7 MMOL/L (ref 0.5–2.2)
LEUKOCYTE ESTERASE UR QL STRIP: ABNORMAL
LIPASE SERPL-CCNC: 9 U/L (ref 11–82)
LYMPHOCYTES # BLD AUTO: 0.8 10E9/L (ref 0.8–5.3)
LYMPHOCYTES NFR BLD AUTO: 9.7 %
MCH RBC QN AUTO: 29.1 PG (ref 26.5–33)
MCHC RBC AUTO-ENTMCNC: 34.3 G/DL (ref 31.5–36.5)
MCV RBC AUTO: 85 FL (ref 78–100)
MONOCYTES # BLD AUTO: 0.6 10E9/L (ref 0–1.3)
MONOCYTES NFR BLD AUTO: 6.7 %
NEUTROPHILS # BLD AUTO: 7 10E9/L (ref 1.6–8.3)
NEUTROPHILS NFR BLD AUTO: 82.2 %
NITRATE UR QL: NEGATIVE
PH UR STRIP: 6.5 PH (ref 5–9)
PLATELET # BLD AUTO: 210 10E9/L (ref 150–450)
POTASSIUM SERPL-SCNC: 3.6 MMOL/L (ref 3.5–5.1)
PROCALCITONIN SERPL-MCNC: <0.5 NG/ML
PROT SERPL-MCNC: 7 G/DL (ref 6.4–8.9)
RBC # BLD AUTO: 5.56 10E12/L (ref 4.4–5.9)
RBC #/AREA URNS AUTO: >100 /HPF
SODIUM SERPL-SCNC: 135 MMOL/L (ref 134–144)
SOURCE: ABNORMAL
SP GR UR STRIP: 1.01 (ref 1–1.03)
UROBILINOGEN UR STRIP-ACNC: 0.2 EU/DL (ref 0.2–1)
WBC # BLD AUTO: 8.5 10E9/L (ref 4–11)
WBC #/AREA URNS AUTO: ABNORMAL /HPF

## 2019-11-21 PROCEDURE — 99284 EMERGENCY DEPT VISIT MOD MDM: CPT | Mod: 25 | Performed by: PHYSICIAN ASSISTANT

## 2019-11-21 PROCEDURE — 36415 COLL VENOUS BLD VENIPUNCTURE: CPT | Performed by: PHYSICIAN ASSISTANT

## 2019-11-21 PROCEDURE — 96375 TX/PRO/DX INJ NEW DRUG ADDON: CPT | Performed by: PHYSICIAN ASSISTANT

## 2019-11-21 PROCEDURE — 83605 ASSAY OF LACTIC ACID: CPT | Performed by: PHYSICIAN ASSISTANT

## 2019-11-21 PROCEDURE — 83690 ASSAY OF LIPASE: CPT | Performed by: PHYSICIAN ASSISTANT

## 2019-11-21 PROCEDURE — C9113 INJ PANTOPRAZOLE SODIUM, VIA: HCPCS | Performed by: PHYSICIAN ASSISTANT

## 2019-11-21 PROCEDURE — 99284 EMERGENCY DEPT VISIT MOD MDM: CPT | Mod: Z6 | Performed by: PHYSICIAN ASSISTANT

## 2019-11-21 PROCEDURE — 81001 URINALYSIS AUTO W/SCOPE: CPT | Performed by: PHYSICIAN ASSISTANT

## 2019-11-21 PROCEDURE — 25800030 ZZH RX IP 258 OP 636: Performed by: PHYSICIAN ASSISTANT

## 2019-11-21 PROCEDURE — 25000128 H RX IP 250 OP 636: Performed by: PHYSICIAN ASSISTANT

## 2019-11-21 PROCEDURE — 87086 URINE CULTURE/COLONY COUNT: CPT | Performed by: EMERGENCY MEDICINE

## 2019-11-21 PROCEDURE — 80053 COMPREHEN METABOLIC PANEL: CPT | Performed by: PHYSICIAN ASSISTANT

## 2019-11-21 PROCEDURE — 85025 COMPLETE CBC W/AUTO DIFF WBC: CPT | Performed by: PHYSICIAN ASSISTANT

## 2019-11-21 PROCEDURE — 71046 X-RAY EXAM CHEST 2 VIEWS: CPT

## 2019-11-21 PROCEDURE — 84145 PROCALCITONIN (PCT): CPT | Performed by: PHYSICIAN ASSISTANT

## 2019-11-21 PROCEDURE — 96365 THER/PROPH/DIAG IV INF INIT: CPT | Performed by: PHYSICIAN ASSISTANT

## 2019-11-21 PROCEDURE — 25000132 ZZH RX MED GY IP 250 OP 250 PS 637: Performed by: PHYSICIAN ASSISTANT

## 2019-11-21 PROCEDURE — 87040 BLOOD CULTURE FOR BACTERIA: CPT | Performed by: PHYSICIAN ASSISTANT

## 2019-11-21 RX ORDER — LEVOFLOXACIN 5 MG/ML
750 INJECTION, SOLUTION INTRAVENOUS ONCE
Status: DISCONTINUED | OUTPATIENT
Start: 2019-11-21 | End: 2019-11-21

## 2019-11-21 RX ORDER — ACETAMINOPHEN 500 MG
500 TABLET ORAL ONCE
Status: COMPLETED | OUTPATIENT
Start: 2019-11-21 | End: 2019-11-21

## 2019-11-21 RX ORDER — SUCRALFATE 1 G/1
1 TABLET ORAL ONCE
Status: COMPLETED | OUTPATIENT
Start: 2019-11-21 | End: 2019-11-21

## 2019-11-21 RX ORDER — CIPROFLOXACIN 2 MG/ML
400 INJECTION, SOLUTION INTRAVENOUS ONCE
Status: COMPLETED | OUTPATIENT
Start: 2019-11-21 | End: 2019-11-21

## 2019-11-21 RX ORDER — SODIUM CHLORIDE 9 MG/ML
1000 INJECTION, SOLUTION INTRAVENOUS CONTINUOUS
Status: DISCONTINUED | OUTPATIENT
Start: 2019-11-21 | End: 2019-11-22 | Stop reason: HOSPADM

## 2019-11-21 RX ADMIN — CIPROFLOXACIN 400 MG: 2 INJECTION, SOLUTION INTRAVENOUS at 19:34

## 2019-11-21 RX ADMIN — ACETAMINOPHEN 500 MG: 500 TABLET, FILM COATED ORAL at 20:34

## 2019-11-21 RX ADMIN — PANTOPRAZOLE SODIUM 40 MG: 40 INJECTION, POWDER, LYOPHILIZED, FOR SOLUTION INTRAVENOUS at 21:29

## 2019-11-21 RX ADMIN — SODIUM CHLORIDE 1000 ML: 9 INJECTION, SOLUTION INTRAVENOUS at 19:13

## 2019-11-21 RX ADMIN — SUCRALFATE 1 G: 1 TABLET ORAL at 21:29

## 2019-11-21 NOTE — TELEPHONE ENCOUNTER
Pt states that he woke up this am not feeling well, stomach ache, chills.  Urinating just fine.  Has not taken his temp. Advised to increase fluids and take ibuprofen as directed.  If startes to run a fever should be seen by PCP or urgent care.  Patient acknowledged understanding of directions, no further questions  Rosalinda Read LPN.......11/21/2019 3:23 PM

## 2019-11-21 NOTE — ED AVS SNAPSHOT
[facility]  [address]  [place] [address]  Phone:  [phone]  Fax:  [phone]                                    Hi Kinney   MRN: [id number]    Department:  [facility]   Date of Visit:  11/21/2019           After Visit Summary Signature Page    I have received my discharge instructions, and my questions have been answered. I have discussed any challenges I see with this plan with the nurse or doctor.    ..........................................................................................................................................  Patient/Patient Representative Signature      ..........................................................................................................................................  Patient Representative Print Name and Relationship to Patient    ..................................................               ................................................  Date                                   Time    ..........................................................................................................................................  Reviewed by Signature/Title    ...................................................              ..............................................  Date                                               Time          22EPIC Rev 08/18

## 2019-11-21 NOTE — ED TRIAGE NOTES
Patient presents to the ED with complaints of fever, chills, nausea, and abdominal pain. Patient states he was here at 0100 Sunday AM and had surgery that AM for kidney stone removal.  Patient states he felt better until last night when the pain returned.  Patient states this continued into the day and is now experiencing painful urination, hematuria, fever, nausea and chills and abdominal pain.

## 2019-11-22 ENCOUNTER — ANESTHESIA (OUTPATIENT)
Dept: SURGERY | Facility: OTHER | Age: 24
End: 2019-11-22
Payer: COMMERCIAL

## 2019-11-22 ENCOUNTER — ANESTHESIA EVENT (OUTPATIENT)
Dept: SURGERY | Facility: OTHER | Age: 24
End: 2019-11-22
Payer: COMMERCIAL

## 2019-11-22 ENCOUNTER — HOSPITAL ENCOUNTER (OUTPATIENT)
Facility: OTHER | Age: 24
Discharge: HOME | End: 2019-11-22
Attending: SURGERY | Admitting: SURGERY
Payer: COMMERCIAL

## 2019-11-22 VITALS
SYSTOLIC BLOOD PRESSURE: 132 MMHG | TEMPERATURE: 98.5 F | OXYGEN SATURATION: 96 % | HEART RATE: 79 BPM | RESPIRATION RATE: 16 BRPM | DIASTOLIC BLOOD PRESSURE: 83 MMHG

## 2019-11-22 DIAGNOSIS — R10.13 EPIGASTRIC PAIN: Primary | ICD-10-CM

## 2019-11-22 PROCEDURE — 88305 TISSUE EXAM BY PATHOLOGIST: CPT

## 2019-11-22 PROCEDURE — 43239 EGD BIOPSY SINGLE/MULTIPLE: CPT | Performed by: SURGERY

## 2019-11-22 PROCEDURE — 25000128 H RX IP 250 OP 636: Performed by: NURSE ANESTHETIST, CERTIFIED REGISTERED

## 2019-11-22 PROCEDURE — 40000010 ZZH STATISTIC ANES STAT CODE-CRNA PER MINUTE: Performed by: SURGERY

## 2019-11-22 PROCEDURE — 25000125 ZZHC RX 250: Performed by: NURSE ANESTHETIST, CERTIFIED REGISTERED

## 2019-11-22 PROCEDURE — 43239 EGD BIOPSY SINGLE/MULTIPLE: CPT | Performed by: NURSE ANESTHETIST, CERTIFIED REGISTERED

## 2019-11-22 PROCEDURE — 25800030 ZZH RX IP 258 OP 636: Performed by: SURGERY

## 2019-11-22 RX ORDER — PROPOFOL 10 MG/ML
INJECTION, EMULSION INTRAVENOUS PRN
Status: DISCONTINUED | OUTPATIENT
Start: 2019-11-22 | End: 2019-11-22

## 2019-11-22 RX ORDER — NALOXONE HYDROCHLORIDE 0.4 MG/ML
.1-.4 INJECTION, SOLUTION INTRAMUSCULAR; INTRAVENOUS; SUBCUTANEOUS
Status: CANCELLED | OUTPATIENT
Start: 2019-11-22 | End: 2019-11-23

## 2019-11-22 RX ORDER — ONDANSETRON 2 MG/ML
4 INJECTION INTRAMUSCULAR; INTRAVENOUS
Status: DISCONTINUED | OUTPATIENT
Start: 2019-11-22 | End: 2019-11-22 | Stop reason: HOSPADM

## 2019-11-22 RX ORDER — FLUMAZENIL 0.1 MG/ML
0.2 INJECTION, SOLUTION INTRAVENOUS
Status: CANCELLED | OUTPATIENT
Start: 2019-11-22 | End: 2019-11-23

## 2019-11-22 RX ORDER — PROPOFOL 10 MG/ML
INJECTION, EMULSION INTRAVENOUS CONTINUOUS PRN
Status: DISCONTINUED | OUTPATIENT
Start: 2019-11-22 | End: 2019-11-22

## 2019-11-22 RX ORDER — SUCRALFATE 1 G/1
1 TABLET ORAL 4 TIMES DAILY
Qty: 120 TABLET | Refills: 3 | Status: SHIPPED | OUTPATIENT
Start: 2019-11-22 | End: 2020-04-08

## 2019-11-22 RX ORDER — LIDOCAINE HYDROCHLORIDE 20 MG/ML
INJECTION, SOLUTION INFILTRATION; PERINEURAL PRN
Status: DISCONTINUED | OUTPATIENT
Start: 2019-11-22 | End: 2019-11-22

## 2019-11-22 RX ORDER — LIDOCAINE 40 MG/G
CREAM TOPICAL
Status: DISCONTINUED | OUTPATIENT
Start: 2019-11-22 | End: 2019-11-22 | Stop reason: HOSPADM

## 2019-11-22 RX ORDER — SODIUM CHLORIDE, SODIUM LACTATE, POTASSIUM CHLORIDE, CALCIUM CHLORIDE 600; 310; 30; 20 MG/100ML; MG/100ML; MG/100ML; MG/100ML
INJECTION, SOLUTION INTRAVENOUS CONTINUOUS
Status: CANCELLED | OUTPATIENT
Start: 2019-11-22

## 2019-11-22 RX ORDER — OMEPRAZOLE 40 MG/1
40 CAPSULE, DELAYED RELEASE ORAL DAILY
Qty: 90 CAPSULE | Refills: 3 | Status: ON HOLD | OUTPATIENT
Start: 2019-11-22 | End: 2020-08-31

## 2019-11-22 RX ORDER — KETAMINE HYDROCHLORIDE 10 MG/ML
INJECTION, SOLUTION INTRAMUSCULAR; INTRAVENOUS PRN
Status: DISCONTINUED | OUTPATIENT
Start: 2019-11-22 | End: 2019-11-22

## 2019-11-22 RX ORDER — SODIUM CHLORIDE, SODIUM LACTATE, POTASSIUM CHLORIDE, CALCIUM CHLORIDE 600; 310; 30; 20 MG/100ML; MG/100ML; MG/100ML; MG/100ML
INJECTION, SOLUTION INTRAVENOUS CONTINUOUS
Status: DISCONTINUED | OUTPATIENT
Start: 2019-11-22 | End: 2019-11-22 | Stop reason: HOSPADM

## 2019-11-22 RX ADMIN — KETAMINE HYDROCHLORIDE 30 MG: 10 INJECTION, SOLUTION INTRAMUSCULAR; INTRAVENOUS at 13:06

## 2019-11-22 RX ADMIN — SODIUM CHLORIDE, POTASSIUM CHLORIDE, SODIUM LACTATE AND CALCIUM CHLORIDE: 600; 310; 30; 20 INJECTION, SOLUTION INTRAVENOUS at 12:05

## 2019-11-22 RX ADMIN — PROPOFOL 100 MG: 10 INJECTION, EMULSION INTRAVENOUS at 13:01

## 2019-11-22 RX ADMIN — PROPOFOL 50 MG: 10 INJECTION, EMULSION INTRAVENOUS at 13:10

## 2019-11-22 RX ADMIN — PROPOFOL 140 MCG/KG/MIN: 10 INJECTION, EMULSION INTRAVENOUS at 13:03

## 2019-11-22 RX ADMIN — LIDOCAINE HYDROCHLORIDE 60 MG: 20 INJECTION, SOLUTION INFILTRATION; PERINEURAL at 13:01

## 2019-11-22 ASSESSMENT — ENCOUNTER SYMPTOMS
BACK PAIN: 0
BRUISES/BLEEDS EASILY: 0
ADENOPATHY: 0
FEVER: 1
CHILLS: 0
WOUND: 0
ABDOMINAL PAIN: 1
CONFUSION: 0
DYSURIA: 1
HEMATURIA: 0
CHEST TIGHTNESS: 0
SHORTNESS OF BREATH: 0

## 2019-11-22 NOTE — ANESTHESIA PREPROCEDURE EVALUATION
Anesthesia Pre-Procedure Evaluation    Patient: Hi Kinney   MRN: [id number] : 1995          Preoperative Diagnosis: Epigastric pain [R10.13]    Procedure(s):  ESOPHAGOGASTRODUODENOSCOPY, WITH BIOPSY    Past Medical History:   Diagnosis Date     Calculus of kidney 2019     Past Surgical History:   Procedure Laterality Date     COMBINED CYSTOSCOPY, URETEROSCOPY, LASER HOLMIUM LITHOTRIPSY URETER(S) Left 3/17/2019    Procedure: COMBINED CYSTOSCOPY, URETEROSCOPY, LASER HOLMIUM LITHOTRIPSY URETER(S)and stent placement;  Surgeon: Veto Peralta MD;  Location: [facility] OR     COMBINED CYSTOSCOPY, URETEROSCOPY, LASER HOLMIUM LITHOTRIPSY URETER(S) Left 2019    Procedure: COMBINED CYSTOSCOPY, URETEROSCOPY, LASER HOLMIUM LITHOTRIPSY URETER(S);  Surgeon: Veto Peralta MD;  Location: [facility] OR     GRAFT SKIN FULL THICKNESS FROM EXTREMITY Right     right calf     TONSILLECTOMY      No Comments Provided       Anesthesia Evaluation     . Pt has had prior anesthetic.     History of anesthetic complications   - PONV        ROS/MED HX    ENT/Pulmonary:  - neg pulmonary ROS     Neurologic:  - neg neurologic ROS     Cardiovascular:  - neg cardiovascular ROS       METS/Exercise Tolerance:  >4 METS   Hematologic:  - neg hematologic  ROS       Musculoskeletal:  - neg musculoskeletal ROS       GI/Hepatic:  - neg GI/hepatic ROS       Renal/Genitourinary:     (+) Nephrolithiasis ,       Endo:  - neg endo ROS       Psychiatric:  - neg psychiatric ROS       Infectious Disease:  - neg infectious disease ROS       Malignancy:      - no malignancy   Other:    - neg other ROS                      Physical Exam  Normal systems: cardiovascular, pulmonary and dental    Airway   Mallampati: II  TM distance: >3 FB  Neck ROM: full    Dental     Cardiovascular   Rhythm and rate: regular and normal      Pulmonary    breath sounds clear to auscultation            Lab Results   Component Value Date    WBC 8.5 2019    HGB 16.2 2019     "HCT 47.2 11/21/2019     11/21/2019     11/21/2019    POTASSIUM 3.6 11/21/2019    CHLORIDE 103 11/21/2019    CO2 21 11/21/2019    BUN 12 11/21/2019    CR 0.95 11/21/2019     11/21/2019    ALICE 8.9 11/21/2019    PHOS 1.8 (L) 03/25/2019    MAG 1.9 03/25/2019    ALBUMIN 4.5 11/21/2019    PROTTOTAL 7.0 11/21/2019    ALT 29 11/21/2019    AST 15 11/21/2019    ALKPHOS 80 11/21/2019    BILITOTAL 0.9 11/21/2019    LIPASE 9 (L) 11/21/2019    PTT 31 11/17/2019    INR 1.05 11/17/2019       Preop Vitals  BP Readings from Last 3 Encounters:   11/21/19 126/67   11/17/19 129/88   11/02/19 104/68    Pulse Readings from Last 3 Encounters:   11/21/19 98   11/17/19 102   11/02/19 100      Resp Readings from Last 3 Encounters:   11/21/19 16   11/17/19 16   11/02/19 20    SpO2 Readings from Last 3 Encounters:   11/21/19 96%   11/17/19 95%   09/21/19 96%      Temp Readings from Last 1 Encounters:   11/21/19 99.8  F (37.7  C) (Tympanic)    Ht Readings from Last 1 Encounters:   11/17/19 1.727 m (5' 8\")      Wt Readings from Last 1 Encounters:   11/21/19 94.8 kg (209 lb)    Estimated body mass index is 31.78 kg/m  as calculated from the following:    Height as of 11/17/19: 1.727 m (5' 8\").    Weight as of 11/21/19: 94.8 kg (209 lb).       Anesthesia Plan      History & Physical Review      ASA Status:  1 .    NPO Status:  > 8 hours    Plan for MAC with Intravenous induction. Maintenance will be Balanced.      Risks, benefits and alternatives discussed and would like to proceed. General anesthesia ok if indicated.         Postoperative Care      Consents  Anesthetic plan, risks, benefits and alternatives discussed with:  Patient and Parent (Mother and/or Father).  Use of blood products discussed: No .   .                 JASMINE Hernández CRNA  "

## 2019-11-22 NOTE — ED PROVIDER NOTES
History     Chief Complaint   Patient presents with     Post-op Problem     Fever     HPI  Hi Kinney is a 24 year old male who presents to the ED with chief complaint of postop problem with fever and abdominal pain.  Approximately 4 to 5 days prior had a lithotripsy with stent placement done by urology for left proximal ureteral stone with hydronephrosis.  Since that time he had been doing pretty well until last night when he began to develop a fever as well as hematuria and dysuria.  Patient also reports abdominal pain that increases when eating, he says is located in his epigastric region has been present for quite some time.  He denies chest pain or shortness of breath.    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     Priority: Medium     Calculus of kidney 04/27/2019     Priority: Medium        Past Medical History:    Past Medical History:   Diagnosis Date     Calculus of kidney 4/27/2019       Past Surgical History:    Past Surgical History:   Procedure Laterality Date     COMBINED CYSTOSCOPY, URETEROSCOPY, LASER HOLMIUM LITHOTRIPSY URETER(S) Left 3/17/2019    Procedure: COMBINED CYSTOSCOPY, URETEROSCOPY, LASER HOLMIUM LITHOTRIPSY URETER(S)and stent placement;  Surgeon: Veto Peralta MD;  Location:  OR     COMBINED CYSTOSCOPY, URETEROSCOPY, LASER HOLMIUM LITHOTRIPSY URETER(S) Left 11/17/2019    Procedure: COMBINED CYSTOSCOPY, URETEROSCOPY, LASER HOLMIUM LITHOTRIPSY URETER(S);  Surgeon: Veto Peralta MD;  Location:  OR     GRAFT SKIN FULL THICKNESS FROM EXTREMITY Right     right calf     TONSILLECTOMY      No Comments Provided       Family History:    History reviewed. No pertinent family history.    Social  History:  Marital Status:  Single [1]  Social History     Tobacco Use     Smoking status: Passive Smoke Exposure - Never Smoker     Smokeless tobacco: Never Used     Tobacco comment: Quit smoking: family members smoke   Substance Use Topics     Alcohol use: Yes     Comment: rare     Drug use: No        Medications:    HYDROcodone-acetaminophen (NORCO) 5-325 MG tablet  ibuprofen (ADVIL/MOTRIN) 200 MG capsule  hydrOXYzine (ATARAX) 25 MG tablet          Review of Systems   Constitutional: Positive for fever. Negative for chills.   HENT: Negative for congestion.    Eyes: Negative for visual disturbance.   Respiratory: Negative for chest tightness and shortness of breath.    Cardiovascular: Negative for chest pain.   Gastrointestinal: Positive for abdominal pain.   Genitourinary: Positive for dysuria. Negative for hematuria.   Musculoskeletal: Negative for back pain.   Skin: Negative for rash and wound.   Neurological: Negative for syncope.   Hematological: Negative for adenopathy. Does not bruise/bleed easily.   Psychiatric/Behavioral: Negative for confusion.       Physical Exam   BP: 121/60  Pulse: 110  Heart Rate: 110  Temp: 101.2  F (38.4  C)  Resp: 17  Weight: 94.8 kg (209 lb)  SpO2: 96 %      Physical Exam  Constitutional:       General: He is not in acute distress.     Appearance: He is well-developed. He is not diaphoretic.   HENT:      Head: Normocephalic and atraumatic.   Eyes:      General: No scleral icterus.     Extraocular Movements: Extraocular movements intact.      Conjunctiva/sclera: Conjunctivae normal.      Pupils: Pupils are equal, round, and reactive to light.   Neck:      Musculoskeletal: Neck supple.   Cardiovascular:      Rate and Rhythm: Normal rate and regular rhythm.   Pulmonary:      Effort: Pulmonary effort is normal.      Breath sounds: Normal breath sounds.   Abdominal:      Palpations: Abdomen is soft.      Tenderness: There is abdominal tenderness.      Comments: Mild epigastric pain    Musculoskeletal: Normal range of motion.         General: No deformity.   Lymphadenopathy:      Cervical: No cervical adenopathy.   Skin:     General: Skin is warm and dry.      Findings: No rash.   Neurological:      General: No focal deficit present.      Mental Status: He is alert and oriented to person, place, and time.   Psychiatric:         Mood and Affect: Mood normal.         Behavior: Behavior normal.         Thought Content: Thought content normal.         Judgment: Judgment normal.         ED Course        Procedures               Critical Care time:  none               Results for orders placed or performed during the hospital encounter of 11/21/19 (from the past 24 hour(s))   *UA reflex to Microscopic   Result Value Ref Range    Color Urine Red     Appearance Urine Clear     Glucose Urine Negative NEG^Negative mg/dL    Bilirubin Urine Negative NEG^Negative    Ketones Urine 15 (A) NEG^Negative mg/dL    Specific Gravity Urine 1.010 1.000 - 1.030    Blood Urine Large (A) NEG^Negative    pH Urine 6.5 5.0 - 9.0 pH    Protein Albumin Urine 30 (A) NEG^Negative mg/dL    Urobilinogen Urine 0.2 0.2 - 1.0 EU/dL    Nitrite Urine Negative NEG^Negative    Leukocyte Esterase Urine Trace (A) NEG^Negative    Source Midstream Urine    Urine Microscopic   Result Value Ref Range    WBC Urine 0 - 5 OTO5^0 - 5 /HPF    RBC Urine >100 (A) OTO2^O - 2 /HPF   CBC with platelets differential   Result Value Ref Range    WBC 8.5 4.0 - 11.0 10e9/L    RBC Count 5.56 4.4 - 5.9 10e12/L    Hemoglobin 16.2 13.3 - 17.7 g/dL    Hematocrit 47.2 40.0 - 53.0 %    MCV 85 78 - 100 fl    MCH 29.1 26.5 - 33.0 pg    MCHC 34.3 31.5 - 36.5 g/dL    RDW 12.9 10.0 - 15.0 %    Platelet Count 210 150 - 450 10e9/L    Diff Method Automated Method     % Neutrophils 82.2 %    % Lymphocytes 9.7 %    % Monocytes 6.7 %    % Eosinophils 0.1 %    % Basophils 0.2 %    % Immature Granulocytes 1.1 %    Absolute Neutrophil 7.0 1.6 - 8.3 10e9/L    Absolute  Lymphocytes 0.8 0.8 - 5.3 10e9/L    Absolute Monocytes 0.6 0.0 - 1.3 10e9/L    Absolute Eosinophils 0.0 0.0 - 0.7 10e9/L    Absolute Basophils 0.0 0.0 - 0.2 10e9/L    Abs Immature Granulocytes 0.1 0 - 0.4 10e9/L   Comprehensive metabolic panel   Result Value Ref Range    Sodium 135 134 - 144 mmol/L    Potassium 3.6 3.5 - 5.1 mmol/L    Chloride 103 98 - 107 mmol/L    Carbon Dioxide 21 21 - 31 mmol/L    Anion Gap 11 3 - 14 mmol/L    Glucose 102 70 - 105 mg/dL    Urea Nitrogen 12 7 - 25 mg/dL    Creatinine 0.95 0.70 - 1.30 mg/dL    GFR Estimate >90 >60 mL/min/[1.73_m2]    GFR Estimate If Black >90 >60 mL/min/[1.73_m2]    Calcium 8.9 8.6 - 10.3 mg/dL    Bilirubin Total 0.9 0.3 - 1.0 mg/dL    Albumin 4.5 3.5 - 5.7 g/dL    Protein Total 7.0 6.4 - 8.9 g/dL    Alkaline Phosphatase 80 34 - 104 U/L    ALT 29 7 - 52 U/L    AST 15 13 - 39 U/L   Lactic acid   Result Value Ref Range    Lactic Acid 0.7 0.5 - 2.2 mmol/L   Lipase   Result Value Ref Range    Lipase 9 (L) 11 - 82 U/L   Procalcitonin   Result Value Ref Range    Procalcitonin <0.50 ng/ml   XR Chest 2 Views    Narrative    PROCEDURE:  XR CHEST 2 VW    HISTORY:  post surgery fever.     COMPARISON:  None.    FINDINGS:   The cardiac silhouette is normal in size. The pulmonary vasculature is  normal.  The lungs are clear. No pleural effusion or pneumothorax.      Impression    IMPRESSION:  No acute cardiopulmonary disease.      JAMIN MAYORGA MD       Medications   0.9% sodium chloride BOLUS (0 mLs Intravenous Stopped 11/21/19 2129)   ciprofloxacin (CIPRO) infusion 400 mg (0 mg Intravenous Stopped 11/21/19 2030)   acetaminophen (TYLENOL) tablet 500 mg (500 mg Oral Given 11/21/19 2034)   pantoprazole (PROTONIX) 40 mg IV push injection (40 mg Intravenous Given 11/21/19 2129)   sucralfate (CARAFATE) tablet 1 g (1 g Oral Given 11/21/19 2129)       Assessments & Plan (with Medical Decision Making)   Patient is nontoxic-appearing no acute distress.  Heart, lung, bowel sounds  normal.  Abdomen soft but with tenderness  to palpation in epigastric area.  Patient is febrile, slightly tachycardic upon arrival.  I have reviewed the nursing notes.    Patient has normal white count and hemoglobin.  CMP is unremarkable, procalcitonin is negative lactic acid is normal.  Lipase is 9.    Urinalysis shows large amount of blood with trace leukocyte esterase and no white cells.  Chest x-ray shows no acute cardiopulmonary disease.    Patient is given Protonix, Carafate, ciprofloxacin, Tylenol, and fluids.    Upon reassessment he appears to be doing very well.    I did consider possible infection due to urine, lungs, intra-abdominal.  Also considered pulmonary embolism.  However, patient is not short of breath, complains of no chest pain.  His lab work does not support the urine or lung infection.  His abdominal pain which appears to be his main complaint in the ED has been ongoing for many months.  I see no further indication for CT scan of his abdomen at this time.  I discussed the case with Dr. Ned García, general surgeon.  He will take the patient for an EGD tomorrow to investigate the cause of patient's epigastric discomfort.  Is felt that the patient's hematuria and dysuria are fairly common following his lithotripsy procedure.  Strict return precautions were given to the patient, he understands and agrees with the plan and patient is discharged.    Cabrera Brandt PA-C    I have reviewed the findings, diagnosis, plan and need for follow up with the patient.       Discharge Medication List as of 11/21/2019  9:54 PM          Final diagnoses:   Fever   Epigastric pain       11/21/2019   [facility]     Cabrera Brandt PA  11/22/19 0200       Cabrera Brandt PA  11/22/19 0203

## 2019-11-22 NOTE — DISCHARGE INSTRUCTIONS
Get plenty of fluids and rest.  As we discussed, Dr. Ned García will be performing an endoscope procedure on you tomorrow.  Referral was placed and message left for surgery center.  They should be contacting you between 0 830 and 9 AM, if you do not hear from them I recommend you call surgery center.  You are not to have any food after midnight and only clear liquids up to 8 AM and then nothing to eat or drink between 8 AM and your procedure.  Return to the ED if you have worsening or concerning symptoms.  Follow-up with urology on Monday.

## 2019-11-22 NOTE — OP NOTE
PROCEDURE NOTE    SURGEON:Arnie García MD    PRE-OP DIAGNOSIS:  Epigastric pain       POST-OP DIAGNOSIS: Epigastric pain, mild gastritis     PROCEDURE PLANNED:   Diagnostic EGD, flexible        PROCEDURE PERFORMED:  EGD with biopsy, flexible    SPECIMEN:      ID Type Source Tests Collected by Time Destination   A :  Biopsy Small Intestine, Duodenum SURGICAL PATHOLOGY EXAM Arnie García MD 11/22/2019  1:11 PM    B :  Biopsy Stomach, Antrum SURGICAL PATHOLOGY EXAM Anrie García MD 11/22/2019  1:13 PM    C :  Biopsy Esophagus, Distal SURGICAL PATHOLOGY EXAM Arnie García MD 11/22/2019  1:14 PM    D :  Tissue Stomach, Fundus SURGICAL PATHOLOGY EXAM Arnie García MD 11/22/2019  1:15 PM            ANESTHESIA: Monitor Anesthesia Care  Coverage requested    CRNA Independent: German Laguna APRN CRNA      ESTIMATED BLOOD LOSS: None    COMPLICATIONS:  None    INDICATION FOR THE PROCEDURE:The patient is a 24 year old male. The patient presents with epigastric pain. I explained to the patient the risks, benefits and alternatives to diagnostic EGD for evaluating ulcers and gastritis . We specifically discussed the risks of bleeding, infection, perforation and the risks of sedation. The patient's questions were answered and the patient wished to proceed. Informed consent paperwork was completed.    PROCEDURE: The patient was taken to the endoscopy suite. Appropriate monitors were attached. The patient was placed in the left lateral decubitus position. Bite block was positioned. Timeout was performed confirming the patient's identity and procedure to be performed. After appropriate sedation was confirmed, the flexible endoscope was advanced into the oropharynx. The posterior oropharynx appeared grossly normal. The scope was advanced into the proximal esophagus. The esophagus was insufflated with air. The scope was advanced under direct visualization. No acute abnormalities of the esophagus were noted. The scope  was advanced into the stomach. The scope was advanced through the pylorus into the duodenal bulb. The bulb and distal duodenum appeared grossly normal. The scope was withdrawn back into the stomach. Antral biopsy was obtained and sent to pathology. The scope was retroflexed and the GE junction inspected. There was minimal gastritis.  The scope was returned to a neutral position and the stomach was decompressed. The scope was withdrawn to the GE junction and biopsy obtained. The z line appeared normal. There was no hiatal hernia. The mucosa of the esophagus was inspected while withdrawing the scope. No abnormalities were noted. The scope was withdrawn from the patient. The bite block was removed. The patient tolerated the procedure with no immediately apparent complication. The patient was taken to recovery instable condition.    FOLLOW UP:  RECOMMENDATIONS Trial of PPI and carafate.     Arnie García MD on 11/22/2019 at 1:29 PM

## 2019-11-22 NOTE — H&P (VIEW-ONLY)
History     Chief Complaint   Patient presents with     Post-op Problem     Fever     HPI  Hi Kinney is a 24 year old male who presents to the ED with chief complaint of postop problem with fever and abdominal pain.  Approximately 4 to 5 days prior had a lithotripsy with stent placement done by urology for left proximal ureteral stone with hydronephrosis.  Since that time he had been doing pretty well until last night when he began to develop a fever as well as hematuria and dysuria.  Patient also reports abdominal pain that increases when eating, he says is located in his epigastric region has been present for quite some time.  He denies chest pain or shortness of breath.    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     Priority: Medium     Calculus of kidney 04/27/2019     Priority: Medium        Past Medical History:    Past Medical History:   Diagnosis Date     Calculus of kidney 4/27/2019       Past Surgical History:    Past Surgical History:   Procedure Laterality Date     COMBINED CYSTOSCOPY, URETEROSCOPY, LASER HOLMIUM LITHOTRIPSY URETER(S) Left 3/17/2019    Procedure: COMBINED CYSTOSCOPY, URETEROSCOPY, LASER HOLMIUM LITHOTRIPSY URETER(S)and stent placement;  Surgeon: Veto Peralta MD;  Location:  OR     COMBINED CYSTOSCOPY, URETEROSCOPY, LASER HOLMIUM LITHOTRIPSY URETER(S) Left 11/17/2019    Procedure: COMBINED CYSTOSCOPY, URETEROSCOPY, LASER HOLMIUM LITHOTRIPSY URETER(S);  Surgeon: Veto Peralta MD;  Location:  OR     GRAFT SKIN FULL THICKNESS FROM EXTREMITY Right     right calf     TONSILLECTOMY      No Comments Provided       Family History:    History reviewed. No pertinent family history.    Social  History:  Marital Status:  Single [1]  Social History     Tobacco Use     Smoking status: Passive Smoke Exposure - Never Smoker     Smokeless tobacco: Never Used     Tobacco comment: Quit smoking: family members smoke   Substance Use Topics     Alcohol use: Yes     Comment: rare     Drug use: No        Medications:    HYDROcodone-acetaminophen (NORCO) 5-325 MG tablet  ibuprofen (ADVIL/MOTRIN) 200 MG capsule  hydrOXYzine (ATARAX) 25 MG tablet          Review of Systems   Constitutional: Positive for fever. Negative for chills.   HENT: Negative for congestion.    Eyes: Negative for visual disturbance.   Respiratory: Negative for chest tightness and shortness of breath.    Cardiovascular: Negative for chest pain.   Gastrointestinal: Positive for abdominal pain.   Genitourinary: Positive for dysuria. Negative for hematuria.   Musculoskeletal: Negative for back pain.   Skin: Negative for rash and wound.   Neurological: Negative for syncope.   Hematological: Negative for adenopathy. Does not bruise/bleed easily.   Psychiatric/Behavioral: Negative for confusion.       Physical Exam   BP: 121/60  Pulse: 110  Heart Rate: 110  Temp: 101.2  F (38.4  C)  Resp: 17  Weight: 94.8 kg (209 lb)  SpO2: 96 %      Physical Exam  Constitutional:       General: He is not in acute distress.     Appearance: He is well-developed. He is not diaphoretic.   HENT:      Head: Normocephalic and atraumatic.   Eyes:      General: No scleral icterus.     Extraocular Movements: Extraocular movements intact.      Conjunctiva/sclera: Conjunctivae normal.      Pupils: Pupils are equal, round, and reactive to light.   Neck:      Musculoskeletal: Neck supple.   Cardiovascular:      Rate and Rhythm: Normal rate and regular rhythm.   Pulmonary:      Effort: Pulmonary effort is normal.      Breath sounds: Normal breath sounds.   Abdominal:      Palpations: Abdomen is soft.      Tenderness: There is abdominal tenderness.      Comments: Mild epigastric pain    Musculoskeletal: Normal range of motion.         General: No deformity.   Lymphadenopathy:      Cervical: No cervical adenopathy.   Skin:     General: Skin is warm and dry.      Findings: No rash.   Neurological:      General: No focal deficit present.      Mental Status: He is alert and oriented to person, place, and time.   Psychiatric:         Mood and Affect: Mood normal.         Behavior: Behavior normal.         Thought Content: Thought content normal.         Judgment: Judgment normal.         ED Course        Procedures               Critical Care time:  none               Results for orders placed or performed during the hospital encounter of 11/21/19 (from the past 24 hour(s))   *UA reflex to Microscopic   Result Value Ref Range    Color Urine Red     Appearance Urine Clear     Glucose Urine Negative NEG^Negative mg/dL    Bilirubin Urine Negative NEG^Negative    Ketones Urine 15 (A) NEG^Negative mg/dL    Specific Gravity Urine 1.010 1.000 - 1.030    Blood Urine Large (A) NEG^Negative    pH Urine 6.5 5.0 - 9.0 pH    Protein Albumin Urine 30 (A) NEG^Negative mg/dL    Urobilinogen Urine 0.2 0.2 - 1.0 EU/dL    Nitrite Urine Negative NEG^Negative    Leukocyte Esterase Urine Trace (A) NEG^Negative    Source Midstream Urine    Urine Microscopic   Result Value Ref Range    WBC Urine 0 - 5 OTO5^0 - 5 /HPF    RBC Urine >100 (A) OTO2^O - 2 /HPF   CBC with platelets differential   Result Value Ref Range    WBC 8.5 4.0 - 11.0 10e9/L    RBC Count 5.56 4.4 - 5.9 10e12/L    Hemoglobin 16.2 13.3 - 17.7 g/dL    Hematocrit 47.2 40.0 - 53.0 %    MCV 85 78 - 100 fl    MCH 29.1 26.5 - 33.0 pg    MCHC 34.3 31.5 - 36.5 g/dL    RDW 12.9 10.0 - 15.0 %    Platelet Count 210 150 - 450 10e9/L    Diff Method Automated Method     % Neutrophils 82.2 %    % Lymphocytes 9.7 %    % Monocytes 6.7 %    % Eosinophils 0.1 %    % Basophils 0.2 %    % Immature Granulocytes 1.1 %    Absolute Neutrophil 7.0 1.6 - 8.3 10e9/L    Absolute  Lymphocytes 0.8 0.8 - 5.3 10e9/L    Absolute Monocytes 0.6 0.0 - 1.3 10e9/L    Absolute Eosinophils 0.0 0.0 - 0.7 10e9/L    Absolute Basophils 0.0 0.0 - 0.2 10e9/L    Abs Immature Granulocytes 0.1 0 - 0.4 10e9/L   Comprehensive metabolic panel   Result Value Ref Range    Sodium 135 134 - 144 mmol/L    Potassium 3.6 3.5 - 5.1 mmol/L    Chloride 103 98 - 107 mmol/L    Carbon Dioxide 21 21 - 31 mmol/L    Anion Gap 11 3 - 14 mmol/L    Glucose 102 70 - 105 mg/dL    Urea Nitrogen 12 7 - 25 mg/dL    Creatinine 0.95 0.70 - 1.30 mg/dL    GFR Estimate >90 >60 mL/min/[1.73_m2]    GFR Estimate If Black >90 >60 mL/min/[1.73_m2]    Calcium 8.9 8.6 - 10.3 mg/dL    Bilirubin Total 0.9 0.3 - 1.0 mg/dL    Albumin 4.5 3.5 - 5.7 g/dL    Protein Total 7.0 6.4 - 8.9 g/dL    Alkaline Phosphatase 80 34 - 104 U/L    ALT 29 7 - 52 U/L    AST 15 13 - 39 U/L   Lactic acid   Result Value Ref Range    Lactic Acid 0.7 0.5 - 2.2 mmol/L   Lipase   Result Value Ref Range    Lipase 9 (L) 11 - 82 U/L   Procalcitonin   Result Value Ref Range    Procalcitonin <0.50 ng/ml   XR Chest 2 Views    Narrative    PROCEDURE:  XR CHEST 2 VW    HISTORY:  post surgery fever.     COMPARISON:  None.    FINDINGS:   The cardiac silhouette is normal in size. The pulmonary vasculature is  normal.  The lungs are clear. No pleural effusion or pneumothorax.      Impression    IMPRESSION:  No acute cardiopulmonary disease.      JAMIN MAYORGA MD       Medications   0.9% sodium chloride BOLUS (0 mLs Intravenous Stopped 11/21/19 2129)   ciprofloxacin (CIPRO) infusion 400 mg (0 mg Intravenous Stopped 11/21/19 2030)   acetaminophen (TYLENOL) tablet 500 mg (500 mg Oral Given 11/21/19 2034)   pantoprazole (PROTONIX) 40 mg IV push injection (40 mg Intravenous Given 11/21/19 2129)   sucralfate (CARAFATE) tablet 1 g (1 g Oral Given 11/21/19 2129)       Assessments & Plan (with Medical Decision Making)   Patient is nontoxic-appearing no acute distress.  Heart, lung, bowel sounds  normal.  Abdomen soft but with tenderness  to palpation in epigastric area.  Patient is febrile, slightly tachycardic upon arrival.  I have reviewed the nursing notes.    Patient has normal white count and hemoglobin.  CMP is unremarkable, procalcitonin is negative lactic acid is normal.  Lipase is 9.    Urinalysis shows large amount of blood with trace leukocyte esterase and no white cells.  Chest x-ray shows no acute cardiopulmonary disease.    Patient is given Protonix, Carafate, ciprofloxacin, Tylenol, and fluids.    Upon reassessment he appears to be doing very well.    I did consider possible infection due to urine, lungs, intra-abdominal.  Also considered pulmonary embolism.  However, patient is not short of breath, complains of no chest pain.  His lab work does not support the urine or lung infection.  His abdominal pain which appears to be his main complaint in the ED has been ongoing for many months.  I see no further indication for CT scan of his abdomen at this time.  I discussed the case with Dr. Ned García, general surgeon.  He will take the patient for an EGD tomorrow to investigate the cause of patient's epigastric discomfort.  Is felt that the patient's hematuria and dysuria are fairly common following his lithotripsy procedure.  Strict return precautions were given to the patient, he understands and agrees with the plan and patient is discharged.    Cabrera Brandt PA-C    I have reviewed the findings, diagnosis, plan and need for follow up with the patient.       Discharge Medication List as of 11/21/2019  9:54 PM          Final diagnoses:   Fever   Epigastric pain       11/21/2019   [facility]     Cabrera Brandt PA  11/22/19 0200       Cabrera Brandt PA  11/22/19 0203

## 2019-11-22 NOTE — ANESTHESIA POSTPROCEDURE EVALUATION
Patient: Hi Kinney    Procedure(s):  ESOPHAGOGASTRODUODENOSCOPY, WITH BIOPSY    Diagnosis:Epigastric pain [R10.13]  Diagnosis Additional Information: No value filed.    Anesthesia Type:  MAC    Note:  Anesthesia Post Evaluation    Patient location during evaluation: Phase 2  Patient participation: Able to fully participate in evaluation  Level of consciousness: awake and alert  Pain management: adequate  Airway patency: patent  Cardiovascular status: acceptable  Respiratory status: acceptable  Hydration status: acceptable  PONV: none     Anesthetic complications: None          Last vitals:  Vitals:    11/22/19 1324 11/22/19 1330 11/22/19 1345   BP: (!) 136/94 132/85 132/83   Pulse: 90 95 79   Resp: 16     Temp: 98.5  F (36.9  C)     SpO2: 98% 98% 96%         Electronically Signed By: JASMINE BURTON CRNA  November 22, 2019  2:55 PM

## 2019-11-22 NOTE — INTERVAL H&P NOTE
I saw and examined Hi Kinney.  I have reviewed the history and physical and find no changes to the patient's medical status or condition with the exceptions noted below.     Arnie García MD   11:44 AM 11/22/2019

## 2019-11-22 NOTE — ANESTHESIA CARE TRANSFER NOTE
Patient: Hi Kinney    Procedure(s):  ESOPHAGOGASTRODUODENOSCOPY, WITH BIOPSY    Diagnosis: Epigastric pain [R10.13]  Diagnosis Additional Information: No value filed.    Anesthesia Type:   MAC     Note:  Airway :Nasal Cannula  Patient transferred to:Phase II  Handoff Report: Identifed the Patient, Identified the Reponsible Provider, Reviewed the pertinent medical history, Discussed the surgical course, Reviewed Intra-OP anesthesia mangement and issues during anesthesia, Set expectations for post-procedure period and Allowed opportunity for questions and acknowledgement of understanding      Vitals: (Last set prior to Anesthesia Care Transfer)    CRNA VITALS  11/22/2019 1252 - 11/22/2019 1327      11/22/2019             Resp Rate (set):  10                Electronically Signed By: JASMINE BURTON CRNA  November 22, 2019  1:27 PM

## 2019-11-24 LAB
BACTERIA SPEC CULT: NORMAL
SPECIMEN SOURCE: NORMAL

## 2019-11-25 ENCOUNTER — OFFICE VISIT (OUTPATIENT)
Dept: UROLOGY | Facility: OTHER | Age: 24
End: 2019-11-25
Attending: UROLOGY
Payer: COMMERCIAL

## 2019-11-25 ENCOUNTER — TELEPHONE (OUTPATIENT)
Dept: UROLOGY | Facility: OTHER | Age: 24
End: 2019-11-25

## 2019-11-25 VITALS — WEIGHT: 207.6 LBS | RESPIRATION RATE: 12 BRPM | BODY MASS INDEX: 31.57 KG/M2 | HEART RATE: 76 BPM

## 2019-11-25 DIAGNOSIS — N20.0 KIDNEY STONES: Primary | ICD-10-CM

## 2019-11-25 PROCEDURE — 52310 CYSTOSCOPY AND TREATMENT: CPT | Performed by: UROLOGY

## 2019-11-25 PROCEDURE — 99212 OFFICE O/P EST SF 10 MIN: CPT | Mod: 25 | Performed by: UROLOGY

## 2019-11-25 ASSESSMENT — PAIN SCALES - GENERAL: PAINLEVEL: MODERATE PAIN (4)

## 2019-11-25 NOTE — PATIENT INSTRUCTIONS
Home Care after Cystoscopy  Follow these guidelines for your care after your procedure.    Activity  No limitations    Bathing or showering  No limitations    Symptoms  You may notice some burning with urination but this usually resolves after 1-2 days.  You may also notice small amounts of blood in your urine.  Please increase water intake for the next few days to help with these symptoms.    Contacts  General Questions: [phone]  Appointments:  [phone]  Emergencies:  [phone]    When to call the clinic  If you develop any of the following symptoms please call the clinic immediately.  If the clinic is closed please be seen at an urgent care clinic or the Emergency Department.  - Burning with urination that worsens after 2 days  - Unable to urinate causing severe pelvic pain  - Fevers of greater than 101 degrees F  - Flank pain that is not responding to pain medication    Follow up  Please follow up as discussed at the appointment.          Please follow the below generic recommendations to help prevent stones.    If you are interested in undergoing a work up (including blood and urine tests) to determine your patient specific factors for why you form stones and ways to specifically prevent stones in the future, ask Dr Peralta if he hasn't already discussed this with you.      Fluids (Increase)  Please increase your fluid intake   Recommend about ten 10-ounce glasses of fluid per day (avoid dark breanna).  Please try and keep your urine clear or pale yellow.    If it is dark yellow or cloudy it is concentrated and you need to drink more fluid.  Try drinking a tall glass of water prior to every snack/meal.    Citrate (Increase)  Citrate prevents stone formation and is naturally found in urine.    It can be increased by adding concentrated lemon juice (4 ounces daily) and/or drinking diluted orange juice (50/50 with water).    Both MinuteMaid Light and Crystal Light also contain citrate and can be helpful for  stone prevention.    If you plan to drink sodas, I would recommend Diet/Sunkist and/or Diet/7UP as they contain the most citrate (which is good) compared to the breanna such as Coke/Pepsi.      Salt (Decrease)  Try to limit salt intake.  Total daily sodium intake should be less than 1500mg.  If you use salt with cooking don't add any additional salt at the table.    Protein  Limit protein intake to small to moderate portions.  For instance, a steak should be no larger than about the size of a deck of cards.  High protein intake leads to stone formation.

## 2019-11-25 NOTE — PROGRESS NOTES
Preoperative diagnosis  Nephrolithiasis    Postoperative diagnosis  Nephrolithiasis    Procedure  Flexible cystourethroscopy with stent removal    Surgeon  Veto Peralta MD    Anesthesia  2% lidocaine jelly intraurethrally    Complications  None    Indications  24 year old male who is status post ureteroscopy with holmium laser lithotripsy who presents for stent removal.    Procedure  The patient was given one dose of antibiotics. The patient was placed in supine position and was prepped and draped in sterile fashion.  2% lidocaine jelly was bluntly injected per urethra without difficulty. I passed the 14 [place] flexible cystoscope through the urethra and into the bladder.  With the aid of a stent grasper I grasped and removed the stent in its entirety.  The patient tolerated the procedure well.    Plan  Discussed generic dietary approach to stone prevention- provided hand out  F/u in 4 to 8 weeks depending on the results of the GI symptom work-up.   -We may be able to restart potassium citrate depending on the findings        I spent 10 minutes on this patient's visit (exclusive of separately billed services/procedures) and over half of this time was spent in face-to-face counseling regarding stone prevention:  Prevention strategies with fluids and diet, rationale for a metabolic work up, prognosis and importance of compliance.

## 2019-11-25 NOTE — NURSING NOTE
Patient positioned in supine position, perineum area prepped with chlorhexidene Gluconate and patient draped per sterile technique. Per verbal order read back by Veto Peralta MD, Urojet 10mL 2% lidocaine jelly to be instilled into urethra.  Urojet- 10ml 2% Lidocaine jelly instilled into the urethra.    Urojet 2%  Lot#: [id number]  Expiration date: 7/21  : [employer]  NDC: [address]-5    [place] Protocol    A. Pre-procedure verification complete Yes  1-relevant information / documentation available, reviewed and properly matched to the patient; 2-consent accurate and complete, 3-equipment and supplies available    B. Site marking complete N/A  Site marked if not in continuous attendance with patient    C. TIME OUT completed Yes  Time Out was conducted just prior to starting procedure to verify the eight required elements: 1-patient identity, 2-consent accurate and complete, 3-position, 4-correct side/site marked (if applicable), 5-procedure, 6-relevant images / results properly labeled and displayed (if applicable), 7-antibiotics / irrigation fluids (if applicable), 8-safety precautions.    After procedure perineum area rinsed. Discharge instructions reviewed with patient. Patient verbalized understanding of discharge instructions and discharged ambulatory.  Yesi Martínez RN..................11/25/2019  2:40 PM

## 2019-11-25 NOTE — NURSING NOTE
Bactrim DS one tablet by mouth one time now ordered by Veto Peralta MD.  Medication administered per verbal order   Lot # [id number]  Exp. 07/2020  Patient tolerated well.  Yesi Martínez RN..................11/25/2019  2:27 PM

## 2019-11-25 NOTE — Clinical Note
Rei Gaitan, if you think of it keep me in the loop on the GI work-up findings.  If you find something specific/modifiable I may be able to justify a second trial of potassium citrate for stone prevention

## 2019-11-25 NOTE — TELEPHONE ENCOUNTER
Patient scheduled for today at 4:00, for post combined Cysto/ L URS: L stent pull. He states he would like something earlier today, due to left flank pain over the last several days. Please relay to Urology , if a later time will work.

## 2019-11-25 NOTE — TELEPHONE ENCOUNTER
"Correction from last phone encounter:   \"Please relay to Urology , if a later time will work.\"    Earlier time, not later time.    "

## 2019-11-27 DIAGNOSIS — R10.13 EPIGASTRIC PAIN: ICD-10-CM

## 2019-11-27 DIAGNOSIS — R10.13 EPIGASTRIC PAIN: Primary | ICD-10-CM

## 2019-11-27 LAB
BACTERIA SPEC CULT: NORMAL
BACTERIA SPEC CULT: NORMAL
SPECIMEN SOURCE: NORMAL
SPECIMEN SOURCE: NORMAL

## 2019-11-27 PROCEDURE — 36415 COLL VENOUS BLD VENIPUNCTURE: CPT | Mod: ZL | Performed by: SURGERY

## 2019-11-27 PROCEDURE — 83516 IMMUNOASSAY NONANTIBODY: CPT | Mod: ZL | Performed by: SURGERY

## 2019-11-27 PROCEDURE — 83516 IMMUNOASSAY NONANTIBODY: CPT | Mod: ZL,91 | Performed by: SURGERY

## 2019-11-29 LAB
TTG IGA SER-ACNC: 1 U/ML
TTG IGG SER-ACNC: <1 U/ML

## 2019-12-02 DIAGNOSIS — R10.13 EPIGASTRIC PAIN: Primary | ICD-10-CM

## 2019-12-04 DIAGNOSIS — R10.13 EPIGASTRIC PAIN: Primary | ICD-10-CM

## 2019-12-10 ENCOUNTER — HOSPITAL ENCOUNTER (OUTPATIENT)
Dept: NUCLEAR MEDICINE | Facility: OTHER | Age: 24
Setting detail: NUCLEAR MEDICINE
Discharge: HOME | End: 2019-12-10
Attending: SURGERY | Admitting: SURGERY
Payer: COMMERCIAL

## 2019-12-10 DIAGNOSIS — R10.13 EPIGASTRIC PAIN: ICD-10-CM

## 2019-12-10 PROCEDURE — 34300033 ZZH RX 343: Performed by: SURGERY

## 2019-12-10 PROCEDURE — A9537 TC99M MEBROFENIN: HCPCS | Performed by: SURGERY

## 2019-12-10 PROCEDURE — 78227 HEPATOBIL SYST IMAGE W/DRUG: CPT

## 2019-12-10 RX ORDER — KIT FOR THE PREPARATION OF TECHNETIUM TC 99M MEBROFENIN 45 MG/10ML
5.54 INJECTION, POWDER, LYOPHILIZED, FOR SOLUTION INTRAVENOUS ONCE
Status: COMPLETED | OUTPATIENT
Start: 2019-12-10 | End: 2019-12-10

## 2019-12-10 RX ADMIN — MEBROFENIN 5.54 MILLICURIE: 45 INJECTION, POWDER, LYOPHILIZED, FOR SOLUTION INTRAVENOUS at 07:55

## 2020-01-13 ENCOUNTER — OFFICE VISIT (OUTPATIENT)
Dept: FAMILY MEDICINE | Facility: OTHER | Age: 25
End: 2020-01-13
Attending: NURSE PRACTITIONER
Payer: COMMERCIAL

## 2020-01-13 VITALS
OXYGEN SATURATION: 98 % | DIASTOLIC BLOOD PRESSURE: 70 MMHG | TEMPERATURE: 97.4 F | BODY MASS INDEX: 32.43 KG/M2 | HEIGHT: 68 IN | WEIGHT: 214 LBS | RESPIRATION RATE: 15 BRPM | SYSTOLIC BLOOD PRESSURE: 102 MMHG | HEART RATE: 83 BPM

## 2020-01-13 DIAGNOSIS — Z76.89 SLEEP CONCERN: Primary | ICD-10-CM

## 2020-01-13 DIAGNOSIS — F41.9 ANXIETY: ICD-10-CM

## 2020-01-13 PROCEDURE — 99214 OFFICE O/P EST MOD 30 MIN: CPT | Performed by: NURSE PRACTITIONER

## 2020-01-13 RX ORDER — TRAZODONE HYDROCHLORIDE 50 MG/1
50 TABLET, FILM COATED ORAL
Qty: 30 TABLET | Refills: 3 | Status: ON HOLD | OUTPATIENT
Start: 2020-01-13 | End: 2020-08-31

## 2020-01-13 ASSESSMENT — MIFFLIN-ST. JEOR: SCORE: 1935.2

## 2020-01-13 ASSESSMENT — PAIN SCALES - GENERAL: PAINLEVEL: NO PAIN (0)

## 2020-01-13 NOTE — NURSING NOTE
Patient presents to clinic today to discuss medication for sleep.     No LMP for male patient.  Medication Reconciliation: complete    Shilpa Melo LPN  1/13/2020 12:35 PM

## 2020-01-13 NOTE — PROGRESS NOTES
HPI:    Hi Kinney is a 24 year old male who presents to clinic today for sleep concerns.  He finds is been difficult to sleep after he has been working night shifts.  He plans to be on night shifts for the next 6 months.  On days that he is working he sleeps better but when he does not work he will go to bed at 2 or 3 in the morning and lay awake until 6 or 7 in the morning.  Most days he gets about 5 to 6 hours of sleep.  He has tried melatonin and sleepy tea.  These were initially working and now they are not as helpful.  He also reports that the fluoxetine was not helpful in managing his anxiety.  He does use hydroxyzine which has been working well for him.  Denies any concerns regarding depression.  He has never been on any other medications for sleep or anxiety.    Past Medical History:   Diagnosis Date     Calculus of kidney 4/27/2019       Past Surgical History:   Procedure Laterality Date     COMBINED CYSTOSCOPY, URETEROSCOPY, LASER HOLMIUM LITHOTRIPSY URETER(S) Left 3/17/2019    Procedure: COMBINED CYSTOSCOPY, URETEROSCOPY, LASER HOLMIUM LITHOTRIPSY URETER(S)and stent placement;  Surgeon: Veto Peralta MD;  Location:  OR     COMBINED CYSTOSCOPY, URETEROSCOPY, LASER HOLMIUM LITHOTRIPSY URETER(S) Left 11/17/2019    Procedure: COMBINED CYSTOSCOPY, URETEROSCOPY, LASER HOLMIUM LITHOTRIPSY URETER(S);  Surgeon: Veto Peralta MD;  Location:  OR     ESOPHAGOSCOPY, GASTROSCOPY, DUODENOSCOPY (EGD), COMBINED N/A 11/22/2019    Procedure: ESOPHAGOGASTRODUODENOSCOPY, WITH BIOPSY;  Surgeon: Arnie García MD;  Location:  OR     GRAFT SKIN FULL THICKNESS FROM EXTREMITY Right     right calf     TONSILLECTOMY      No Comments Provided       History reviewed. No pertinent family history.    Social History     Socioeconomic History     Marital status: Single     Spouse name: Not on file     Number of children: Not on file     Years of education: Not on file     Highest education level: Not on file   Occupational  History     Not on file   Social Needs     Financial resource strain: Not on file     Food insecurity:     Worry: Not on file     Inability: Not on file     Transportation needs:     Medical: Not on file     Non-medical: Not on file   Tobacco Use     Smoking status: Passive Smoke Exposure - Never Smoker     Smokeless tobacco: Never Used     Tobacco comment: Quit smoking: family members smoke   Substance and Sexual Activity     Alcohol use: Yes     Comment: rare     Drug use: No     Sexual activity: Yes   Lifestyle     Physical activity:     Days per week: Not on file     Minutes per session: Not on file     Stress: Not on file   Relationships     Social connections:     Talks on phone: Not on file     Gets together: Not on file     Attends Congregational service: Not on file     Active member of club or organization: Not on file     Attends meetings of clubs or organizations: Not on file     Relationship status: Not on file     Intimate partner violence:     Fear of current or ex partner: Not on file     Emotionally abused: Not on file     Physically abused: Not on file     Forced sexual activity: Not on file   Other Topics Concern     Parent/sibling w/ CABG, MI or angioplasty before 65F 55M? Not Asked   Social History Narrative    Lives with both parents, has a younger sister.    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       Current Outpatient Medications   Medication Sig Dispense Refill     hydrOXYzine (ATARAX) 25 MG tablet Take 1 tablet (25 mg) by mouth 3 times daily as needed for itching 60 tablet 0     ibuprofen (ADVIL/MOTRIN) 200 MG capsule Take 400 mg by mouth every 6 hours as needed for pain        omeprazole (PRILOSEC) 40 MG DR capsule Take 1 capsule (40 mg) by mouth daily 90 capsule 3     sertraline (ZOLOFT) 50 MG tablet Take 1/2 tablet daily for 3 days then 1 tablet daily 90 tablet 3     sucralfate (CARAFATE) 1 GM tablet Take 1 tablet (1 g) by mouth 4 times  "daily 120 tablet 3     traZODone (DESYREL) 50 MG tablet Take 1 tablet (50 mg) by mouth nightly as needed for sleep 30 tablet 3       Allergies   Allergen Reactions     Cefprozil      Other reaction(s): *Unknown - Childhood Rxn     Augmentin Rash     Childhood reaction       ROS:  Pertinent positives and negatives are noted in HPI.    EXAM:  /70 (BP Location: Right arm, Patient Position: Sitting, Cuff Size: Adult Regular)   Pulse 83   Temp 97.4  F (36.3  C) (Tympanic)   Resp 15   Ht 1.727 m (5' 8\")   Wt 97.1 kg (214 lb)   SpO2 98%   BMI 32.54 kg/m    General appearance: well appearing male, in no acute distress  Respiratory: clear to auscultation bilaterally  Cardiac: RRR with no murmurs  Psychological: normal affect, alert and pleasant    PHQ Depression Screen  No flowsheet data found.  NATI-7 SCORE 6/20/2019   Total Score 12         ASSESSMENT AND PLAN:    1. Sleep concern    2. Anxiety      For sleep we will have him try trazodone 50 mg 30 to 60 minutes prior to desired sleep time.  We will also stop fluoxetine and start sertraline 50 mg daily.  Plan to start 25 mg daily for 3 days and increase to 50 mg.  I like to see him back in 30 to 60 days to see how things are going with both sleep and anxiety.    Discussed side effects which include but are not limited to headache, stomachache, sleep disturbance, appetite suppression, emotional lability. Also discussed black box warning on all SSRI medication for increased thoughts of suicidality or self harm in the initial phase of treatment. If any thoughts of self harm/suicide, family is asked to seek medical care or call 911 or First Call for Help/Crisis Team for evaluation.  Follow up for any new or concerning side effects or any other questions.     I spent approximately 25 minutes with the patient (exclusive of separately billed services/procedures), with greater than 50% spent in counseling, prognosis, risks and benefits of management or follow-up.  " Reviewed importance of compliance with chosen treatment options and follow-up.  Risk factor reduction and patient education and coordinating care, establishing and/or reviewing the patient's medical record also completed during today's exam .      JASMINE Mott CNP..................1/13/2020 12:35 PM      This document was prepared using voice generated software.  While every attempt was made for accuracy, grammatical errors may exist.

## 2020-01-23 ENCOUNTER — OFFICE VISIT (OUTPATIENT)
Dept: FAMILY MEDICINE | Facility: OTHER | Age: 25
End: 2020-01-23
Attending: NURSE PRACTITIONER
Payer: COMMERCIAL

## 2020-01-23 VITALS
HEART RATE: 119 BPM | RESPIRATION RATE: 16 BRPM | BODY MASS INDEX: 31.8 KG/M2 | DIASTOLIC BLOOD PRESSURE: 63 MMHG | WEIGHT: 209.8 LBS | SYSTOLIC BLOOD PRESSURE: 105 MMHG | TEMPERATURE: 98.9 F | HEIGHT: 68 IN | OXYGEN SATURATION: 96 %

## 2020-01-23 DIAGNOSIS — A08.4 VIRAL GASTROENTERITIS: Primary | ICD-10-CM

## 2020-01-23 PROCEDURE — 99213 OFFICE O/P EST LOW 20 MIN: CPT | Performed by: NURSE PRACTITIONER

## 2020-01-23 ASSESSMENT — MIFFLIN-ST. JEOR: SCORE: 1921.02

## 2020-01-23 ASSESSMENT — PAIN SCALES - GENERAL: PAINLEVEL: NO PAIN (0)

## 2020-01-24 NOTE — NURSING NOTE
"Chief Complaint   Patient presents with     Vomiting      and diarrhea since this am     Fever     and chils x 24 hours       Initial /63   Pulse 119   Temp 98.9  F (37.2  C) (Tympanic)   Resp 16   Ht 1.735 m (5' 8.31\")   Wt 95.2 kg (209 lb 12.8 oz)   SpO2 96%   BMI 31.61 kg/m   Estimated body mass index is 31.61 kg/m  as calculated from the following:    Height as of this encounter: 1.735 m (5' 8.31\").    Weight as of this encounter: 95.2 kg (209 lb 12.8 oz).  Medication Reconciliation: complete    Rossy Painter LPN  "

## 2020-01-24 NOTE — PROGRESS NOTES
"Nursing Notes:   Rossy Painter LPN  1/23/2020  6:45 PM  Sign at exiting of workspace  Chief Complaint   Patient presents with     Vomiting      and diarrhea since this am     Fever     and chils x 24 hours       Initial /63   Pulse 119   Temp 98.9  F (37.2  C) (Tympanic)   Resp 16   Ht 1.735 m (5' 8.31\")   Wt 95.2 kg (209 lb 12.8 oz)   SpO2 96%   BMI 31.61 kg/m    Estimated body mass index is 31.61 kg/m  as calculated from the following:    Height as of this encounter: 1.735 m (5' 8.31\").    Weight as of this encounter: 95.2 kg (209 lb 12.8 oz).  Medication Reconciliation: complete    Rossy Painter LPN    SUBJECTIVE:   Hi Kinney is a 24 year old male who presents to clinic today for the following health issues:    Patient presents to the rapid clinic for evaluation of illness.  Since this morning he has experienced vomiting and diarrhea.  He has noted fevers however they have gone unmeasured.  He has had episodes of hot and cold sensations.  He has not been around any else who is been ill.  In the last 24 hours he has vomited 3 times, diarrhea has been multiple times, he has not counted.  He has decreased appetite but has been pushing fluids.      Problem list and histories reviewed & adjusted, as indicated.  Additional history: as documented    Patient Active Problem List   Diagnosis     Calculus of kidney     Hypocitraturia     Ureteral stone with hydronephrosis     Past Surgical History:   Procedure Laterality Date     COMBINED CYSTOSCOPY, URETEROSCOPY, LASER HOLMIUM LITHOTRIPSY URETER(S) Left 3/17/2019    Procedure: COMBINED CYSTOSCOPY, URETEROSCOPY, LASER HOLMIUM LITHOTRIPSY URETER(S)and stent placement;  Surgeon: Veto Peralta MD;  Location:  OR     COMBINED CYSTOSCOPY, URETEROSCOPY, LASER HOLMIUM LITHOTRIPSY URETER(S) Left 11/17/2019    Procedure: COMBINED CYSTOSCOPY, URETEROSCOPY, LASER HOLMIUM LITHOTRIPSY URETER(S);  Surgeon: Veto Peralta MD;  Location:  OR     " "ESOPHAGOSCOPY, GASTROSCOPY, DUODENOSCOPY (EGD), COMBINED N/A 11/22/2019    Procedure: ESOPHAGOGASTRODUODENOSCOPY, WITH BIOPSY;  Surgeon: Arnie García MD;  Location: [facility] OR     GRAFT SKIN FULL THICKNESS FROM EXTREMITY Right     right calf     TONSILLECTOMY      No Comments Provided       Social History     Tobacco Use     Smoking status: Never Smoker     Smokeless tobacco: Never Used   Substance Use Topics     Alcohol use: Yes     Comment: rare     No family history on file.      Current Outpatient Medications   Medication Sig Dispense Refill     hydrOXYzine (ATARAX) 25 MG tablet Take 1 tablet (25 mg) by mouth 3 times daily as needed for itching 60 tablet 0     ibuprofen (ADVIL/MOTRIN) 200 MG capsule Take 400 mg by mouth every 6 hours as needed for pain        omeprazole (PRILOSEC) 40 MG DR capsule Take 1 capsule (40 mg) by mouth daily 90 capsule 3     sucralfate (CARAFATE) 1 GM tablet Take 1 tablet (1 g) by mouth 4 times daily 120 tablet 3     traZODone (DESYREL) 50 MG tablet Take 1 tablet (50 mg) by mouth nightly as needed for sleep 30 tablet 3     sertraline (ZOLOFT) 50 MG tablet Take 1/2 tablet daily for 3 days then 1 tablet daily (Patient not taking: Reported on 1/23/2020) 90 tablet 3     Allergies   Allergen Reactions     Cefprozil      Other reaction(s): *Unknown - Childhood Rxn     Augmentin Rash     Childhood reaction         ROS:  Notable findings in the HPI.       OBJECTIVE:     /63   Pulse 119   Temp 98.9  F (37.2  C) (Tympanic)   Resp 16   Ht 1.735 m (5' 8.31\")   Wt 95.2 kg (209 lb 12.8 oz)   SpO2 96%   BMI 31.61 kg/m    Body mass index is 31.61 kg/m .  GENERAL: alert and no distress  EYES: Eyes grossly normal to inspection  HENT: normal cephalic/atraumatic, oropharynx clear and oral mucous membranes moist  NECK: no adenopathy  RESP: lungs clear to auscultation - no rales, rhonchi or wheezes  CV: regular rates and rhythm, normal S1 S2, no S3 or S4, no murmur, click or rub, peripheral " pulses strong and no peripheral edema  ABDOMEN: soft, nontender, without hepatosplenomegaly or masses and bowel sounds normal  SKIN: no suspicious lesions or rashes  PSYCH: mentation appears normal, affect normal/bright    Diagnostic Test Results:  none     ASSESSMENT/PLAN:     1. Viral gastroenteritis      PLAN:    Gastro: Fluids, time, rest, BRAT Diet, Tylenol and Ibuprofen    Followup:    If not improving or if condition worsens, follow up with your Primary Care Provider    I explained my diagnostic considerations and recommendations to the patient, who voiced understanding and agreement with the treatment plan. All questions were answered. We discussed potential side effects of any prescribed or recommended therapies, as well as expectations for response to treatments.  He was advised to contact our office if there is no improvement or worsening of conditions or symptoms.  If s/s worsen or persist, patient will either come back or follow up with PCP.    Disclaimer:  This note consists of words and symbols derived from keyboarding, dictation, or using voice recognition software. As a result, there may be errors in the script that have gone undetected. Please consider this when interpreting information found in this note.      Rosibel Hernandez NP, 1/23/2020 6:57 PM

## 2020-03-03 ENCOUNTER — OFFICE VISIT (OUTPATIENT)
Dept: SURGERY | Facility: OTHER | Age: 25
End: 2020-03-03
Attending: SURGERY
Payer: COMMERCIAL

## 2020-03-03 VITALS
RESPIRATION RATE: 20 BRPM | TEMPERATURE: 96 F | DIASTOLIC BLOOD PRESSURE: 66 MMHG | BODY MASS INDEX: 31.64 KG/M2 | WEIGHT: 210 LBS | SYSTOLIC BLOOD PRESSURE: 108 MMHG | HEART RATE: 64 BPM

## 2020-03-03 DIAGNOSIS — K81.1 CHRONIC CHOLECYSTITIS: Primary | ICD-10-CM

## 2020-03-03 PROCEDURE — 99203 OFFICE O/P NEW LOW 30 MIN: CPT | Performed by: SURGERY

## 2020-03-03 RX ORDER — CLINDAMYCIN PHOSPHATE 900 MG/50ML
900 INJECTION, SOLUTION INTRAVENOUS
Status: CANCELLED | OUTPATIENT
Start: 2020-03-03

## 2020-03-03 RX ORDER — CLINDAMYCIN PHOSPHATE 900 MG/50ML
900 INJECTION, SOLUTION INTRAVENOUS SEE ADMIN INSTRUCTIONS
Status: CANCELLED | OUTPATIENT
Start: 2020-03-03

## 2020-03-03 ASSESSMENT — PAIN SCALES - GENERAL: PAINLEVEL: NO PAIN (0)

## 2020-03-03 NOTE — NURSING NOTE
"Chief Complaint   Patient presents with     RECHECK     epigastric pain       Initial /66 (BP Location: Right arm, Patient Position: Sitting, Cuff Size: Adult Regular)   Pulse 64   Temp 96  F (35.6  C) (Tympanic)   Resp 20   Wt 95.3 kg (210 lb)   BMI 31.64 kg/m   Estimated body mass index is 31.64 kg/m  as calculated from the following:    Height as of 1/23/20: 1.735 m (5' 8.31\").    Weight as of this encounter: 95.3 kg (210 lb).  Medication Reconciliation: Completed     Emily Gamez LPN  "

## 2020-03-03 NOTE — PROGRESS NOTES
Anesthesia PreOp Note    HPI:     Frankie Trejo is a 36year old female who presents for preoperative consultation requested by: Buddy Greenberg MD    Date of Surgery: 4/13/2017    Procedure(s):   MYOMECTOMY HYSTEROSCOPIC  Indication: menorrhagia, endo OFFICE CONSULTATION NOTE  Patient Name: Hi Kinney  Address: [address] [address]  Age:24 year old  Sex: male     Primary Care Physician: Vero García    I was requested to see this patient in consultation by Dr Peralta for evaluation of RUQ pain. A copy of this note will be sent to Vero García.    HPI:   The patient is 24 year old male with episodes of RUQ pain, epigastric pain, nausea and vomiting. These have been going on since last summer. Pt has a hx of kidney stones and has started medication around that time. The pain persisted despite medication withdrawal. The patient was seen 6/22/2019. Pt had a mild T bili elevation.  AN US at that point did not show stones.  Since then the LFT have been normal.  Pt underwent EGD for possible PUD and was found to have gastritis, lymphocytosis of the duodenum and reflux esophagitis. The Epigastric and LUQ symptoms have resolve. Pt now notes post prandial RUQ pain. A HIDA was done and was borderline at 40%.  Patient denies  light colored stools. Pt has occasional diarrhea.  Patient hasn't had any dark urine. Patient denies fevers. Pt was offered cholecystectomy vs observation previously. Pt would like to proceed with cholecystectomy.     REVIEW OF SYSTEMS  GENERAL: No fevers or chills. Denies fatigue, recent weight loss.  HEENT: No sinus drainage. No changes with vision or hearing. No difficulty swallowing.   LYMPHATICS:  No swollen nodes inaxilla, neck or groin.  CARDIOVASCULAR: Denies chest pain, palpitations and dyspnea on exertion.  PULMONARY: No shortness of breath or cough. No increase in sputum production.  GI: Denies melena, bright red blood instools. No hematemesis. No constipation or diarrhea.  : No dysuria or hematuria.  SKIN: No recent rashes or ulcers.   HEMATOLOGY:  No history of easy bruising or bleeding.  ENDOCRINE:  No history of diabetes orthyroid problems.  NEUROLOGY:  No history of seizures or headaches. No motor or  4/13/2017 at Unknown time   HUMULIN R U-500, CONCENTRATED, 500 UNIT/ML Subcutaneous Solution INJECT 10 UNITS WITH BREAKFAST AND LUNCH (50 UNITS OF U-100) & 13 UNITS WITH DINNER (65 OF U-100) Disp: 60 mL Rfl: 0 4/12/2017 at Unknown time   METFORMIN HCL ER 5 sensory changes.    PAST MEDICAL HISTORY    Past Medical History:   Diagnosis Date     Calculus of kidney 4/27/2019       PAST SURGICAL HISTORY    Past Surgical History:   Procedure Laterality Date     COMBINED CYSTOSCOPY, URETEROSCOPY, LASER HOLMIUM LITHOTRIPSY URETER(S) Left 3/17/2019    Procedure: COMBINED CYSTOSCOPY, URETEROSCOPY, LASER HOLMIUM LITHOTRIPSY URETER(S)and stent placement;  Surgeon: Veto Peralta MD;  Location:  OR     COMBINED CYSTOSCOPY, URETEROSCOPY, LASER HOLMIUM LITHOTRIPSY URETER(S) Left 11/17/2019    Procedure: COMBINED CYSTOSCOPY, URETEROSCOPY, LASER HOLMIUM LITHOTRIPSY URETER(S);  Surgeon: Veto Peralta MD;  Location:  OR     ESOPHAGOSCOPY, GASTROSCOPY, DUODENOSCOPY (EGD), COMBINED N/A 11/22/2019    Procedure: ESOPHAGOGASTRODUODENOSCOPY, WITH BIOPSY;  Surgeon: Arnie García MD;  Location:  OR     GRAFT SKIN FULL THICKNESS FROM EXTREMITY Right     right calf     TONSILLECTOMY      No Comments Provided       CURRENT MEDS  hydrOXYzine (ATARAX) 25 MG tablet, Take 1 tablet (25 mg) by mouth 3 times daily as needed for itching  ibuprofen (ADVIL/MOTRIN) 200 MG capsule, Take 400 mg by mouth every 6 hours as needed for pain   omeprazole (PRILOSEC) 40 MG DR capsule, Take 1 capsule (40 mg) by mouth daily  sertraline (ZOLOFT) 50 MG tablet, Take 1/2 tablet daily for 3 days then 1 tablet daily (Patient not taking: Reported on 1/23/2020)  sucralfate (CARAFATE) 1 GM tablet, Take 1 tablet (1 g) by mouth 4 times daily  traZODone (DESYREL) 50 MG tablet, Take 1 tablet (50 mg) by mouth nightly as needed for sleep    No current facility-administered medications on file prior to visit.     ALLERGIES/SENSITIVITIES  Allergies   Allergen Reactions     Cefprozil      Other reaction(s): *Unknown - Childhood Rxn     Augmentin Rash     Childhood reaction     FAMILY HISTORY  No family history on file.    SOCIAL HISTORY  Social History     Socioeconomic History     Marital status: Single     Spouse name: Not on  URINEPREG Negative 04/13/2017             Vital Signs: Body mass index is 41.18 kg/(m^2). height is 1.626 m (5' 4\") and weight is 108.863 kg (240 lb). Her oral temperature is 98.9 °F (37.2 °C). Her blood pressure is 152/85 and her pulse is 110.  Her r file     Number of children: Not on file     Years of education: Not on file     Highest education level: Not on file   Occupational History     Not on file   Social Needs     Financial resource strain: Not on file     Food insecurity:     Worry: Not on file     Inability: Not on file     Transportation needs:     Medical: Not on file     Non-medical: Not on file   Tobacco Use     Smoking status: Never Smoker     Smokeless tobacco: Never Used   Substance and Sexual Activity     Alcohol use: Yes     Comment: rare     Drug use: No     Sexual activity: Not Currently   Lifestyle     Physical activity:     Days per week: Not on file     Minutes per session: Not on file     Stress: Not on file   Relationships     Social connections:     Talks on phone: Not on file     Gets together: Not on file     Attends Yarsani service: Not on file     Active member of club or organization: Not on file     Attends meetings of clubs or organizations: Not on file     Relationship status: Not on file     Intimate partner violence:     Fear of current or ex partner: Not on file     Emotionally abused: Not on file     Physically abused: Not on file     Forced sexual activity: Not on file   Other Topics Concern     Parent/sibling w/ CABG, MI or angioplasty before 65F 55M? Not Asked   Social History Narrative    Lives with both parents, has a younger sister.    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     The above history was reviewed 3/3/2020.    PHYSICAL EXAM  /66 (BP Location: Right arm, Patient Position: Sitting, Cuff Size: Adult Regular)   Pulse 64   Temp 96  F (35.6  C) (Tympanic)   Resp 20   Wt 95.3 kg (210 lb)   BMI 31.64 kg/m      GENERAL: Healthy appearing patient in noacute distress. Pleasant and cooperative with exam and interview.   HEENT: Head-normocephalic. Eyes-no scleral icterus, pupils equal, round, and reactive to light. Nose-no nasal drainage. No lesions.  Mouth-oral mucosapink and moist, no lesions.  NECK: Supple. No thyroid nodules. Trachea midline.  LYMPHATICS:  No cervical, axillary or supraclavicular adenopathy.  CV: Regular rate and rhythm, no murmurs. No peripheral edema.  LUNGS:  No respiratory distress. Clear bilaterally to auscultation.  ABDOMEN: Non distended. Bowel sounds active. Soft, non-tender, no hepatosplenomegaly or hernias. No peritoneal signs.  SKIN: Pink, warm and dry. Nojaundice. No rash.  NEURO:  Cranial nerves II-XII grossly intact. Alert and oriented.  PSYCH: Appropriate mood and affect.      IMAGING/LAB  I personally reviewed patient's US, CT and HIDA    CONSULTATION ASSESSMENT AND PLAN/RECOMMENDATIONS:   I discussed with the patient the pathophysiology of gallbladder disease and gallstones with the patient. We specifically discussed the treament of biliary dyskinesia/chronic cholecystitis with laproscopic cholecystectomy. I explained therisks, benefits and alternatives to surgical treatment including the specific risks of infection, bleeding, injury to the common bile duct and other abdominal organs, post cholecystectomy diarrhea, post op bile leak, and continued abdominal pain. We discussed the possible need for open surgery. The patient expressed understanding and wishes to proceed. Informed consent paperwork was completed.  This will be scheduled at a time that is convenient for the patient. The patient will call with questions or concerns prior to the surgery. The patient denies questions at this time      Arnie García MD on 3/3/2020 at 3:22 PM

## 2020-03-04 ENCOUNTER — TELEPHONE (OUTPATIENT)
Dept: SURGERY | Facility: OTHER | Age: 25
End: 2020-03-04

## 2020-03-04 ENCOUNTER — HOSPITAL ENCOUNTER (OUTPATIENT)
Facility: OTHER | Age: 25
End: 2020-03-04
Attending: SURGERY | Admitting: SURGERY
Payer: COMMERCIAL

## 2020-03-04 NOTE — TELEPHONE ENCOUNTER
Called to schedule surgery with Dr. García on 4/2/2020.  Has surgery handbook.    Nyla Serrano LPN..........3/4/2020  3:33 PM

## 2020-03-11 ENCOUNTER — HEALTH MAINTENANCE LETTER (OUTPATIENT)
Age: 25
End: 2020-03-11

## 2020-03-17 ENCOUNTER — TELEPHONE (OUTPATIENT)
Dept: SURGERY | Facility: OTHER | Age: 25
End: 2020-03-17

## 2020-03-17 NOTE — TELEPHONE ENCOUNTER
Left message to call back.    Patient's upcoming surgery is cancelled due to COVID-19 Protocol.  Will reschedule at a later date.     Emily Gamez LPN ....................  3/17/2020   10:31 AM

## 2020-04-08 ENCOUNTER — OFFICE VISIT (OUTPATIENT)
Dept: FAMILY MEDICINE | Facility: OTHER | Age: 25
End: 2020-04-08
Attending: NURSE PRACTITIONER
Payer: COMMERCIAL

## 2020-04-08 VITALS
BODY MASS INDEX: 31.19 KG/M2 | HEART RATE: 64 BPM | TEMPERATURE: 98.8 F | WEIGHT: 207 LBS | RESPIRATION RATE: 16 BRPM | SYSTOLIC BLOOD PRESSURE: 100 MMHG | DIASTOLIC BLOOD PRESSURE: 68 MMHG

## 2020-04-08 DIAGNOSIS — R09.82 POST-NASAL DRIP: Primary | ICD-10-CM

## 2020-04-08 PROCEDURE — 99213 OFFICE O/P EST LOW 20 MIN: CPT | Performed by: NURSE PRACTITIONER

## 2020-04-08 ASSESSMENT — PATIENT HEALTH QUESTIONNAIRE - PHQ9: 5. POOR APPETITE OR OVEREATING: NOT AT ALL

## 2020-04-08 ASSESSMENT — ANXIETY QUESTIONNAIRES
1. FEELING NERVOUS, ANXIOUS, OR ON EDGE: NOT AT ALL
2. NOT BEING ABLE TO STOP OR CONTROL WORRYING: NOT AT ALL
7. FEELING AFRAID AS IF SOMETHING AWFUL MIGHT HAPPEN: NOT AT ALL
6. BECOMING EASILY ANNOYED OR IRRITABLE: NOT AT ALL
GAD7 TOTAL SCORE: 0
IF YOU CHECKED OFF ANY PROBLEMS ON THIS QUESTIONNAIRE, HOW DIFFICULT HAVE THESE PROBLEMS MADE IT FOR YOU TO DO YOUR WORK, TAKE CARE OF THINGS AT HOME, OR GET ALONG WITH OTHER PEOPLE: NOT DIFFICULT AT ALL
3. WORRYING TOO MUCH ABOUT DIFFERENT THINGS: NOT AT ALL
5. BEING SO RESTLESS THAT IT IS HARD TO SIT STILL: NOT AT ALL

## 2020-04-08 ASSESSMENT — ENCOUNTER SYMPTOMS
FEVER: 0
SORE THROAT: 1
SINUS PAIN: 0
CHILLS: 0
RHINORRHEA: 0
COUGH: 0
SINUS PRESSURE: 0
SHORTNESS OF BREATH: 0

## 2020-04-08 ASSESSMENT — PAIN SCALES - GENERAL: PAINLEVEL: MODERATE PAIN (4)

## 2020-04-08 NOTE — PROGRESS NOTES
SUBJECTIVE:   Hi Kinney is a 24 year old male who presents to clinic today for the following health issues:    HPI  Patient presents for evaluation of sore throat on and off for the last 10 days. He recently is recovering from a head cold and still having sinus drainage. He notes he usually has chronic sinus infections usually 4-5 times a year. He doesn't always take antibiotics for this, and often time they improve with time. He has found that acupuncturist has been most effective for his sinus congestion/drainage symptoms. He also occasionally does complete nasal saline rinses. Denies seasonal allergies. He has no known exposure to strep. 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 No fevers. No ear pain. No cough.  Some sinus pressure, but denies pain. Notes drainage is clear and thin.   His most bothersome symptom at this time is his sore throat. History of tonsillectomy.     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     Priority: Medium     Calculus of kidney 04/27/2019     Priority: Medium     Past Medical History:   Diagnosis Date     Calculus of kidney 4/27/2019      Past Surgical History:   Procedure Laterality Date     COMBINED CYSTOSCOPY, URETEROSCOPY, LASER HOLMIUM LITHOTRIPSY URETER(S) Left 3/17/2019    Procedure: COMBINED CYSTOSCOPY, URETEROSCOPY, LASER HOLMIUM LITHOTRIPSY URETER(S)and stent placement;  Surgeon: Veto Peralta MD;  Location:  OR     COMBINED CYSTOSCOPY, URETEROSCOPY, LASER HOLMIUM LITHOTRIPSY URETER(S) Left 11/17/2019    Procedure: COMBINED CYSTOSCOPY, URETEROSCOPY, LASER HOLMIUM LITHOTRIPSY URETER(S);  Surgeon: Veto Peralta MD;  Location:  OR     ESOPHAGOSCOPY, GASTROSCOPY, DUODENOSCOPY (EGD), COMBINED N/A 11/22/2019    Procedure: ESOPHAGOGASTRODUODENOSCOPY, WITH BIOPSY;  Surgeon: Arnie García MD;   Location: [facility] OR     GRAFT SKIN FULL THICKNESS FROM EXTREMITY Right     right calf     TONSILLECTOMY      No Comments Provided       Review of Systems   Constitutional: Negative for chills and fever.   HENT: Positive for sore throat. Negative for congestion, ear discharge, ear pain, rhinorrhea, sinus pressure and sinus pain.    Respiratory: Negative for cough and shortness of breath.    Cardiovascular: Negative for chest pain.        OBJECTIVE:     /68   Pulse 64   Temp 98.8  F (37.1  C) (Tympanic)   Resp 16   Wt 93.9 kg (207 lb)   BMI 31.19 kg/m    Body mass index is 31.19 kg/m .  Physical Exam  Constitutional:       General: He is not in acute distress.     Appearance: Normal appearance. He is normal weight. He is not ill-appearing or toxic-appearing.   HENT:      Head: Normocephalic.      Right Ear: Tympanic membrane normal.      Left Ear: Tympanic membrane normal.      Nose: Nose normal.      Right Sinus: No maxillary sinus tenderness or frontal sinus tenderness.      Left Sinus: No maxillary sinus tenderness or frontal sinus tenderness.      Mouth/Throat:      Mouth: Mucous membranes are moist.      Pharynx: Oropharynx is clear. No oropharyngeal exudate or posterior oropharyngeal erythema.   Cardiovascular:      Rate and Rhythm: Normal rate and regular rhythm.   Pulmonary:      Effort: Pulmonary effort is normal.      Breath sounds: Normal breath sounds.   Neurological:      Mental Status: He is alert.         Diagnostic Test Results:  none     ASSESSMENT/PLAN:   1. Post-nasal drip  We discussed treatments to help with post nasal drip symptoms due to rhinosinusitis. Patient likely has some underlying chronic rhino sinusitis as well. We will treat symptoms of post nasal drip, as I do not think this is infectious. Plan on nasal saline rinses which I suggested  1-2 times daily based on symptoms. I sent him the recipe for irrigation solution he can make at home. Could also consider adding a Claritin or  zyrtec daily or nasal steroid such as Flonase/Nasocort to help with symptoms. If symptoms worsen or develop fever consider re-evaluation and may need antibiotic therapy.   We discussed the possibility of strep testing today, but based on exam and no fevers we both agreed unlikely to be strep throat.     Angelina Gonzalez [facility] AND [facility]

## 2020-04-08 NOTE — NURSING NOTE
"Coming in for a sore throat times one week    Chief Complaint   Patient presents with     Pharyngitis     times one week, alot of drainage fronm sinuses        Initial /68   Pulse 64   Temp 98.8  F (37.1  C) (Tympanic)   Resp 16   Wt 93.9 kg (207 lb)   BMI 31.19 kg/m   Estimated body mass index is 31.19 kg/m  as calculated from the following:    Height as of 1/23/20: 1.735 m (5' 8.31\").    Weight as of this encounter: 93.9 kg (207 lb).  Medication Reconciliation: complete    Delmi Espinal LPN  "

## 2020-04-09 ASSESSMENT — ANXIETY QUESTIONNAIRES: GAD7 TOTAL SCORE: 0

## 2020-05-11 ENCOUNTER — TELEPHONE (OUTPATIENT)
Dept: SURGERY | Facility: OTHER | Age: 25
End: 2020-05-11

## 2020-05-19 ENCOUNTER — THERAPY VISIT (OUTPATIENT)
Dept: CHIROPRACTIC MEDICINE | Facility: OTHER | Age: 25
End: 2020-05-19
Attending: CHIROPRACTOR
Payer: COMMERCIAL

## 2020-05-19 DIAGNOSIS — M99.01 SEGMENTAL AND SOMATIC DYSFUNCTION OF CERVICAL REGION: ICD-10-CM

## 2020-05-19 DIAGNOSIS — M54.2 CERVICALGIA: Primary | ICD-10-CM

## 2020-05-19 DIAGNOSIS — G44.209 TENSION HEADACHE: ICD-10-CM

## 2020-05-19 DIAGNOSIS — M99.02 SEGMENTAL AND SOMATIC DYSFUNCTION OF THORACIC REGION: ICD-10-CM

## 2020-05-19 PROCEDURE — 98940 CHIROPRACT MANJ 1-2 REGIONS: CPT | Mod: AT | Performed by: CHIROPRACTOR

## 2020-05-19 PROCEDURE — 99212 OFFICE O/P EST SF 10 MIN: CPT | Mod: 25 | Performed by: CHIROPRACTOR

## 2020-05-19 NOTE — PROGRESS NOTES
Visit #:  1/2-4  New Episode 5/19/2020    Subjective:  Hi Kinney is a 24 year old male who is seen for:        Cervicalgia  Tension headache  Segmental and somatic dysfunction of cervical region  Segmental and somatic dysfunction of thoracic region.     Since last visit on Visit date not found,  Hi Kinney reports: Having a stiff neck over the past several days.  Patient reports that on Sunday he began experiencing headache symptoms.  Patient did attempt to manage symptoms with Tylenol, ibuprofen, Excedrin.  Patient reports that home intervention did not provide much relief of neck pain or headache symptoms.  As symptoms are not improving and are beginning to cause difficulty with sleeping patient contacted our office for evaluation and treatment.  Patient denied any recent traumatic events or overuse type injuries prior to occurrence of a stiff neck.  No radicular complaints noted into the upper extremities.  Patient denies any other symptoms such as fever, cough, chills, nausea or vomiting or other such symptoms as these.    Area of chief complaint:  Cervical/headaches :  Symptoms are graded at 3-7/10. The quality is described as throbbing, aching, tight.      Objective:  The following was observed:  Neck Disability Index (  Todd H. [employer] Jes C. 1991. All rights reserved.; used with permission) 5/19/2020   SECTION 1 - PAIN INTENSITY 2   SECTION 2 - PERSONAL CARE 0   SECTION 3 - LIFTING -1   SECTION 4 - READING 0   SECTION 5 - HEADACHES 3   SECTION 6 - CONCENTRATION 0   SECTION 7 - WORK 0   SECTION 8 - DRIVING 1   SECTION 9 - SLEEPING 3   SECTION 10 - RECREATION 1   Count 9   Sum 10   Raw Score: /50 11.11   Neck Disability Index Score: (%) 22.22     Cervical AROM: mild restriction with lateral flexion approximately 5 degrees in each direction. Rotation, flexion and extension are all unremarkable    Cervical compression negative for local pain or radicular symptoms  Cervical distraction positive    P:  palpatory tenderness Elicited C2/3 bilaterally, right side T4:    A: static palpation demonstrates intersegmental asymmetry , cervical, thoracic  R: motion palpation notes restricted motion, restricted motion , C2 , C3  and T4   T: muscle spasm at level(s): Cervical paraspinal musculature and suboccipitals bilaterally, taut and tender fibers noted of the right rhomboids:      Segmental spinal dysfunction/restrictions found at:  :  C2 Left lateral flexion restricted and Extension restriction  C3 Right lateral flexion restricted and Extension restriction  T4 Right lateral flexion restricted and Extension restriction.      Assessment: Mild to moderate flareup of neck pain and headache symptoms.  Patient has been under our care with similar complaints in the past and has responded favorably with chiropractic adjustments.  I do believe that to be the case with current flareup.  Plan of care to include between 2-4 chiropractic visits over the next 4 weeks.    Diagnoses:      1. Cervicalgia    2. Tension headache    3. Segmental and somatic dysfunction of cervical region    4. Segmental and somatic dysfunction of thoracic region        Patient's condition:  Patient had restrictions pre-manipulation and Patient symptoms are worsened.    Treatment effectiveness:  Post manipulation there is better intersegmental movement and Patient claims to feel looser post manipulation      Procedures:  97124 Established patient, straight forward complexity, 10 minutes    CMT:  01503 Chiropractic manipulative treatment 1-2 regions performed   Cervical: Diversified, C2, C3 , Supine  Thoracic: Diversified, T4, Prone    Modalities:  74102: MSTM:  To cervical paraspinals:   for 3 min    Therapeutic procedures:  None    Response to Treatment  Reduction in symptoms as reported by patient    Prognosis: Excellent    Progress towards Goals: Reduce neck pain and headaches by 50%  Improve Cervical spine ROM by 25%  Patient will report being able to  sleep without painful limits    Recommendations:    Instructions:ice 20 minutes every other hour as needed and heat 15 minutes every other hour as needed    Follow-up:  Continue treatment PRN.

## 2020-05-27 NOTE — TELEPHONE ENCOUNTER
Patient has not called back.  Will close this encounter and patient can call when he is ready to schedule his surgery.  Emily Gamez LPN........................5/27/2020  9:01 AM

## 2020-07-30 ENCOUNTER — ALLIED HEALTH/NURSE VISIT (OUTPATIENT)
Dept: FAMILY MEDICINE | Facility: OTHER | Age: 25
End: 2020-07-30
Attending: NURSE PRACTITIONER
Payer: COMMERCIAL

## 2020-07-30 DIAGNOSIS — R50.9 FEVER AND CHILLS: Primary | ICD-10-CM

## 2020-07-30 PROCEDURE — C9803 HOPD COVID-19 SPEC COLLECT: HCPCS

## 2020-07-30 PROCEDURE — U0003 INFECTIOUS AGENT DETECTION BY NUCLEIC ACID (DNA OR RNA); SEVERE ACUTE RESPIRATORY SYNDROME CORONAVIRUS 2 (SARS-COV-2) (CORONAVIRUS DISEASE [COVID-19]), AMPLIFIED PROBE TECHNIQUE, MAKING USE OF HIGH THROUGHPUT TECHNOLOGIES AS DESCRIBED BY CMS-2020-01-R: HCPCS | Mod: ZL | Performed by: FAMILY MEDICINE

## 2020-07-30 PROCEDURE — 99207 ZZC NO CHARGE NURSE ONLY: CPT

## 2020-07-31 LAB
SARS-COV-2 RNA SPEC QL NAA+PROBE: NOT DETECTED
SPECIMEN SOURCE: NORMAL

## 2020-08-04 ENCOUNTER — OFFICE VISIT (OUTPATIENT)
Dept: FAMILY MEDICINE | Facility: OTHER | Age: 25
End: 2020-08-04
Attending: NURSE PRACTITIONER
Payer: COMMERCIAL

## 2020-08-04 ENCOUNTER — THERAPY VISIT (OUTPATIENT)
Dept: CHIROPRACTIC MEDICINE | Facility: OTHER | Age: 25
End: 2020-08-04
Attending: CHIROPRACTOR
Payer: COMMERCIAL

## 2020-08-04 VITALS
HEART RATE: 75 BPM | SYSTOLIC BLOOD PRESSURE: 108 MMHG | OXYGEN SATURATION: 98 % | TEMPERATURE: 97.7 F | RESPIRATION RATE: 18 BRPM | BODY MASS INDEX: 31.31 KG/M2 | DIASTOLIC BLOOD PRESSURE: 62 MMHG | WEIGHT: 207.8 LBS

## 2020-08-04 DIAGNOSIS — M99.02 SEGMENTAL AND SOMATIC DYSFUNCTION OF THORACIC REGION: ICD-10-CM

## 2020-08-04 DIAGNOSIS — M99.01 SEGMENTAL AND SOMATIC DYSFUNCTION OF CERVICAL REGION: ICD-10-CM

## 2020-08-04 DIAGNOSIS — R53.83 FATIGUE, UNSPECIFIED TYPE: Primary | ICD-10-CM

## 2020-08-04 DIAGNOSIS — M54.2 DORSALGIA OF CERVICOTHORACIC REGION: ICD-10-CM

## 2020-08-04 DIAGNOSIS — M54.6 DORSALGIA OF CERVICOTHORACIC REGION: ICD-10-CM

## 2020-08-04 DIAGNOSIS — Z01.89 PATIENT REQUEST FOR DIAGNOSTIC TESTING: ICD-10-CM

## 2020-08-04 DIAGNOSIS — G44.209 TENSION HEADACHE: Primary | ICD-10-CM

## 2020-08-04 LAB
BASOPHILS # BLD AUTO: 0.1 10E9/L (ref 0–0.2)
BASOPHILS NFR BLD AUTO: 0.6 %
DIFFERENTIAL METHOD BLD: ABNORMAL
EOSINOPHIL # BLD AUTO: 0.1 10E9/L (ref 0–0.7)
EOSINOPHIL NFR BLD AUTO: 0.9 %
ERYTHROCYTE [DISTWIDTH] IN BLOOD BY AUTOMATED COUNT: 13 % (ref 10–15)
HCT VFR BLD AUTO: 51.8 % (ref 40–53)
HGB BLD-MCNC: 17.5 G/DL (ref 13.3–17.7)
IMM GRANULOCYTES # BLD: 0.1 10E9/L (ref 0–0.4)
IMM GRANULOCYTES NFR BLD: 0.9 %
LYMPHOCYTES # BLD AUTO: 2.2 10E9/L (ref 0.8–5.3)
LYMPHOCYTES NFR BLD AUTO: 27.1 %
MCH RBC QN AUTO: 28.9 PG (ref 26.5–33)
MCHC RBC AUTO-ENTMCNC: 33.8 G/DL (ref 31.5–36.5)
MCV RBC AUTO: 86 FL (ref 78–100)
MONOCYTES # BLD AUTO: 0.7 10E9/L (ref 0–1.3)
MONOCYTES NFR BLD AUTO: 8.8 %
NEUTROPHILS # BLD AUTO: 4.9 10E9/L (ref 1.6–8.3)
NEUTROPHILS NFR BLD AUTO: 61.7 %
PLATELET # BLD AUTO: 236 10E9/L (ref 150–450)
RBC # BLD AUTO: 6.06 10E12/L (ref 4.4–5.9)
TSH SERPL DL<=0.05 MIU/L-ACNC: 1.32 IU/ML (ref 0.34–5.6)
WBC # BLD AUTO: 7.9 10E9/L (ref 4–11)

## 2020-08-04 PROCEDURE — 98940 CHIROPRACT MANJ 1-2 REGIONS: CPT | Mod: AT | Performed by: CHIROPRACTOR

## 2020-08-04 PROCEDURE — 99214 OFFICE O/P EST MOD 30 MIN: CPT | Performed by: NURSE PRACTITIONER

## 2020-08-04 PROCEDURE — 84443 ASSAY THYROID STIM HORMONE: CPT | Mod: ZL | Performed by: NURSE PRACTITIONER

## 2020-08-04 PROCEDURE — 87798 DETECT AGENT NOS DNA AMP: CPT | Mod: ZL | Performed by: NURSE PRACTITIONER

## 2020-08-04 PROCEDURE — 99212 OFFICE O/P EST SF 10 MIN: CPT | Mod: 25 | Performed by: CHIROPRACTOR

## 2020-08-04 PROCEDURE — 85025 COMPLETE CBC W/AUTO DIFF WBC: CPT | Mod: ZL | Performed by: NURSE PRACTITIONER

## 2020-08-04 PROCEDURE — 86618 LYME DISEASE ANTIBODY: CPT | Mod: ZL | Performed by: NURSE PRACTITIONER

## 2020-08-04 PROCEDURE — 36415 COLL VENOUS BLD VENIPUNCTURE: CPT | Mod: ZL | Performed by: NURSE PRACTITIONER

## 2020-08-04 ASSESSMENT — PAIN SCALES - GENERAL: PAINLEVEL: NO PAIN (0)

## 2020-08-04 NOTE — NURSING NOTE
"Chief Complaint   Patient presents with     Fatigue     Patient is here for fatigue and just not feeling well that started Wednesday. Patient states his highest temp was 99.6F. Patient has had a negative Covid test. Patient states he has had a headache and stomach ache off and on.     Initial /62   Pulse 75   Temp 97.7  F (36.5  C) (Tympanic)   Resp 18   Wt 94.3 kg (207 lb 12.8 oz)   SpO2 98%   BMI 31.31 kg/m   Estimated body mass index is 31.31 kg/m  as calculated from the following:    Height as of 1/23/20: 1.735 m (5' 8.31\").    Weight as of this encounter: 94.3 kg (207 lb 12.8 oz).  Medication Reconciliation: complete    Neli Kidd LPN  "

## 2020-08-04 NOTE — PROGRESS NOTES
Visit #:  2 PRN  New Episode 8/4/2020    Subjective:  Hi Kinney is a 25 year old male who is seen for:        Tension headache  Dorsalgia of cervicothoracic region  Segmental and somatic dysfunction of cervical region  Segmental and somatic dysfunction of thoracic region.     Since last visit on 5/19/2020,  Hi Kinney reports: Over the past week he has been experiencing increased amounts of neck and headache pain symptoms.  Patient denied any overuse activities or traumatic events prior to flareup.  Patient also notes that he has been feeling quite tired/fatigued over the past week as well.  Yesterday patient presented to rapid clinic for evaluation and COVID 19 testing.  Tests were negative.    No cough or fever.  Patient does note some mild nausea symptoms without vomiting.  Patient is scheduled to undergo cholecystectomy.  This needed to be postponed due to COVID outbreak however.    Patient is wondering if symptoms could be related to his pillows as symptoms have been quite consistent when he wakes in the morning.    Due to past patient history of needing chiropractic adjustments to help with neck pain and headache symptoms patient contacted our office for follow-up evaluation and treatment.    Area of chief complaint:  Thoracic :  Symptoms are graded at 4/10. The quality is described as stiff.    Headaches:  Symptoms are graded at 1-5/10.      Objective:  The following was observed:  Neck Disability Index: Not Assessed    Oswestry Index: Not assessed       Cervical AROM: Unremarkable    Cervical Compression:- Localized symptoms, -radicular complaints  Cervical Distraction: positive      P: palpatory tenderness Present right side C1, left side C5, T6 midline:    A: static palpation demonstrates intersegmental asymmetry , cervical, thoracic  R: motion palpation notes restricted motion, C1 , C5  and T6   T: No spasms are apparent.  Taut and tender fibers noted of the suboccipital musculature and  paraspinal musculature cervical into the upper thoracic spine bilaterally.  Taut tender fibers also noted of the upper trapezius muscle bilaterally    Segmental spinal dysfunction/restrictions found at:  :  C1 ASLR listing  C5 Body left listing  T6 Extension restriction.      Assessment: Evidence present of segmental/somatic dysfunction of the cervical spine along with upper thoracic spine consistent with patient's symptoms.  We did discuss alternative pillows.  Patient was encouraged to try different firmness'/variety to see if this would help his symptoms.     Diagnoses:      1. Tension headache    2. Dorsalgia of cervicothoracic region    3. Segmental and somatic dysfunction of cervical region    4. Segmental and somatic dysfunction of thoracic region        Patient's condition:  Patient had restrictions pre-manipulation and Patient symptoms are worsened    Treatment effectiveness:  Post manipulation there is better intersegmental movement and Patient claims to feel looser post manipulation      Procedures:  09244 Established patient, straight forward complexity, 10 minutes    CMT:  42428 Chiropractic manipulative treatment 1-2 regions performed   Cervical: Diversified, C1 , C5 , Supine  Thoracic: Diversified, T6, Prone    Modalities:  None performed this visit    Therapeutic procedures:  None    Response to Treatment  Reduction in symptoms as reported by patient    Prognosis: Good    Progress towards Goals: Goal: Reduce headaches by 50%  Reduce midback pain by 50%     Recommendations:    Instructions:ice 20 minutes every other hour as needed, heat 15 minutes every other hour as needed and  utilize different pillows    Follow-up:  Return to care if symptoms persist.

## 2020-08-04 NOTE — PROGRESS NOTES
HPI:    Hi Kinney is a 25 year old male  who presents to [facility] today for fatigue, requesting tick testing.    States symptoms of fatigue, body aches, muscle aches, headaches, nausea started after camping for a week.  States low grade fever of 99.4 last week.  No known attached ticks but seen many crawling on him and is out in the woods daily.  No sore throat.   Chest congestion, tightness and heaviness initially for a couple of days then resolved.   Mild stuffy nose initially, resolved.  States he is sleeping well but not waking up feeling rested.  Unsure if he snores.  Normal appetite, no vomiting.  No rashes.  Hx of gallbladder disease, he is waiting to have his gallbladder removed, covid hit so it was postponed and now he wants to wait until summer is over.    Occasional Ibuprofen.        Past Medical History:   Diagnosis Date     Calculus of kidney 4/27/2019     Past Surgical History:   Procedure Laterality Date     COMBINED CYSTOSCOPY, URETEROSCOPY, LASER HOLMIUM LITHOTRIPSY URETER(S) Left 3/17/2019    Procedure: COMBINED CYSTOSCOPY, URETEROSCOPY, LASER HOLMIUM LITHOTRIPSY URETER(S)and stent placement;  Surgeon: Veto Peralta MD;  Location:  OR     COMBINED CYSTOSCOPY, URETEROSCOPY, LASER HOLMIUM LITHOTRIPSY URETER(S) Left 11/17/2019    Procedure: COMBINED CYSTOSCOPY, URETEROSCOPY, LASER HOLMIUM LITHOTRIPSY URETER(S);  Surgeon: Veto Peralta MD;  Location:  OR     ESOPHAGOSCOPY, GASTROSCOPY, DUODENOSCOPY (EGD), COMBINED N/A 11/22/2019    Procedure: ESOPHAGOGASTRODUODENOSCOPY, WITH BIOPSY;  Surgeon: Arnie García MD;  Location:  OR     GRAFT SKIN FULL THICKNESS FROM EXTREMITY Right     right calf     TONSILLECTOMY      No Comments Provided     Social History     Tobacco Use     Smoking status: Never Smoker     Smokeless tobacco: Never Used   Substance Use Topics     Alcohol use: Yes     Comment: rare     Current Outpatient Medications   Medication Sig Dispense Refill     hydrOXYzine  (ATARAX) 25 MG tablet Take 1 tablet (25 mg) by mouth 3 times daily as needed for itching 60 tablet 0     omeprazole (PRILOSEC) 40 MG DR capsule Take 1 capsule (40 mg) by mouth daily 90 capsule 3     traZODone (DESYREL) 50 MG tablet Take 1 tablet (50 mg) by mouth nightly as needed for sleep 30 tablet 3     Allergies   Allergen Reactions     Cefprozil      Other reaction(s): *Unknown - Childhood Rxn     Augmentin Rash     Childhood reaction         Past medical history, past surgical history, current medications and allergies reviewed and accurate to the best of my knowledge.        ROS:  Refer to HPI    /62   Pulse 75   Temp 97.7  F (36.5  C) (Tympanic)   Resp 18   Wt 94.3 kg (207 lb 12.8 oz)   SpO2 98%   BMI 31.31 kg/m      EXAM:  General Appearance: Well appearing adult male, non ill appearance, appropriate appearance for age. No acute distress  Eyes: conjunctivae normal without erythema or irritation, corneas clear, no drainage or crusting, no eyelid swelling, pupils equal   Orophayrnx: moist mucous membranes, posterior pharynx without erythema, tonsils without hypertrophy, no erythema, no exudates or petechiae, no post nasal drip seen, no trismus, voice clear.    Respiratory: normal chest wall and respirations.  Normal effort.  Clear to auscultation bilaterally, no wheezing, crackles or rhonchi.  No increased work of breathing.  No cough appreciated.   Cardiac: RRR with no murmurs  Musculoskeletal:  Equal movement of bilateral upper extremities.  Equal movement of bilateral lower extremities.  Normal gait.    Dermatological: no rashes noted of exposed skin  Psychological: normal affect, alert, oriented, and pleasant.       Labs:  Results for orders placed or performed in visit on 08/04/20   TSH     Status: None   Result Value Ref Range    Thyrotropin 1.32 0.34 - 5.60 IU/mL   CBC and Differential     Status: Abnormal   Result Value Ref Range    WBC 7.9 4.0 - 11.0 10e9/L    RBC Count 6.06 (H) 4.4 - 5.9  10e12/L    Hemoglobin 17.5 13.3 - 17.7 g/dL    Hematocrit 51.8 40.0 - 53.0 %    MCV 86 78 - 100 fl    MCH 28.9 26.5 - 33.0 pg    MCHC 33.8 31.5 - 36.5 g/dL    RDW 13.0 10.0 - 15.0 %    Platelet Count 236 150 - 450 10e9/L    Diff Method Automated Method     % Neutrophils 61.7 %    % Lymphocytes 27.1 %    % Monocytes 8.8 %    % Eosinophils 0.9 %    % Basophils 0.6 %    % Immature Granulocytes 0.9 %    Absolute Neutrophil 4.9 1.6 - 8.3 10e9/L    Absolute Lymphocytes 2.2 0.8 - 5.3 10e9/L    Absolute Monocytes 0.7 0.0 - 1.3 10e9/L    Absolute Eosinophils 0.1 0.0 - 0.7 10e9/L    Absolute Basophils 0.1 0.0 - 0.2 10e9/L    Abs Immature Granulocytes 0.1 0 - 0.4 10e9/L             ASSESSMENT/PLAN:    CBC - normal  TSH- normal   Lyme test pending  Anaplasma test pending  Ehrlichia test pending  Patient had negative covid testing on 7/30 and declines repeat testing today.    Discussed with patient will check basis labs today and treat accordingly.    Follow up with PCP if symptoms persist or worsen or concerns      1. Fatigue, unspecified type    - CBC and Differential; Future  - CBC and Differential  - TSH; Future  - TSH  - Lyme Disease Ab with reflex to WB Serum; Future  - Lyme Disease Ab with reflex to WB Serum  - Ehrlichia Anaplasma Sp by PCR; Future  - Ehrlichia Anaplasma Sp by PCR    2. Patient request for diagnostic testing    - CBC and Differential; Future  - CBC and Differential  - Lyme Disease Ab with reflex to WB Serum; Future  - Lyme Disease Ab with reflex to WB Serum  - Ehrlichia Anaplasma Sp by PCR; Future  - Ehrlichia Anaplasma Sp by PCR              I explained my diagnostic considerations and recommendations to the patient, who voiced understanding and agreement with the treatment plan. All questions were answered. We discussed potential side effects of any prescribed or recommended therapies, as well as expectations for response to treatments.    Disclaimer:  This note consists of words and symbols derived  from keyboarding, dictation, or using voice recognition software. As a result, there may be errors in the script that have gone undetected. Please consider this when interpreting information found in this note.

## 2020-08-05 NOTE — PATIENT INSTRUCTIONS
ice 20 minutes every other hour as needed, heat 15 minutes every other hour as needed and  utilize different pillows

## 2020-08-06 LAB — B BURGDOR IGG+IGM SER QL: 0.11 (ref 0–0.89)

## 2020-08-09 LAB
A PHAGOCYTOPH DNA BLD QL NAA+PROBE: NOT DETECTED
E CHAFFEENSIS DNA BLD QL NAA+PROBE: NOT DETECTED
E EWINGII DNA SPEC QL NAA+PROBE: NOT DETECTED
EHRLICHIA DNA SPEC QL NAA+PROBE: NOT DETECTED

## 2020-08-31 ENCOUNTER — ANESTHESIA (OUTPATIENT)
Dept: SURGERY | Facility: OTHER | Age: 25
End: 2020-08-31
Payer: COMMERCIAL

## 2020-08-31 ENCOUNTER — APPOINTMENT (OUTPATIENT)
Dept: CT IMAGING | Facility: OTHER | Age: 25
End: 2020-08-31
Attending: FAMILY MEDICINE
Payer: COMMERCIAL

## 2020-08-31 ENCOUNTER — APPOINTMENT (OUTPATIENT)
Dept: GENERAL RADIOLOGY | Facility: OTHER | Age: 25
End: 2020-08-31
Attending: UROLOGY
Payer: COMMERCIAL

## 2020-08-31 ENCOUNTER — HOSPITAL ENCOUNTER (OUTPATIENT)
Facility: OTHER | Age: 25
Setting detail: OBSERVATION
Discharge: HOME | End: 2020-08-31
Attending: FAMILY MEDICINE | Admitting: FAMILY MEDICINE
Payer: COMMERCIAL

## 2020-08-31 ENCOUNTER — ANESTHESIA EVENT (OUTPATIENT)
Dept: SURGERY | Facility: OTHER | Age: 25
End: 2020-08-31
Payer: COMMERCIAL

## 2020-08-31 ENCOUNTER — SURGERY (OUTPATIENT)
Age: 25
End: 2020-08-31
Payer: COMMERCIAL

## 2020-08-31 VITALS
BODY MASS INDEX: 31.98 KG/M2 | TEMPERATURE: 98.5 F | SYSTOLIC BLOOD PRESSURE: 112 MMHG | HEIGHT: 68 IN | WEIGHT: 211 LBS | OXYGEN SATURATION: 98 % | RESPIRATION RATE: 16 BRPM | HEART RATE: 94 BPM | DIASTOLIC BLOOD PRESSURE: 56 MMHG

## 2020-08-31 DIAGNOSIS — N20.1 RIGHT URETERAL STONE: Primary | ICD-10-CM

## 2020-08-31 DIAGNOSIS — N20.1 RIGHT URETERAL STONE: ICD-10-CM

## 2020-08-31 DIAGNOSIS — N20.1 CALCULUS OF URETER: ICD-10-CM

## 2020-08-31 DIAGNOSIS — Z79.899 ENCOUNTER FOR LONG-TERM (CURRENT) USE OF OTHER MEDICATIONS: ICD-10-CM

## 2020-08-31 DIAGNOSIS — Z11.59 ENCOUNTER FOR SCREENING FOR OTHER VIRAL DISEASES: ICD-10-CM

## 2020-08-31 DIAGNOSIS — N20.1 URETEROLITHIASIS: ICD-10-CM

## 2020-08-31 DIAGNOSIS — Z87.442 PERSONAL HISTORY OF URINARY CALCULI: ICD-10-CM

## 2020-08-31 LAB
ALBUMIN UR-MCNC: 10 MG/DL
ANION GAP SERPL CALCULATED.3IONS-SCNC: 11 MMOL/L (ref 3–14)
APPEARANCE UR: ABNORMAL
BASOPHILS # BLD AUTO: 0.1 10E9/L (ref 0–0.2)
BASOPHILS NFR BLD AUTO: 0.4 %
BILIRUB UR QL STRIP: NEGATIVE
BUN SERPL-MCNC: 17 MG/DL (ref 7–25)
CALCIUM SERPL-MCNC: 9.4 MG/DL (ref 8.6–10.3)
CHLORIDE SERPL-SCNC: 101 MMOL/L (ref 98–107)
CO2 SERPL-SCNC: 24 MMOL/L (ref 21–31)
COLOR UR AUTO: ABNORMAL
CREAT SERPL-MCNC: 1.27 MG/DL (ref 0.7–1.3)
DIFFERENTIAL METHOD BLD: ABNORMAL
EOSINOPHIL # BLD AUTO: 0 10E9/L (ref 0–0.7)
EOSINOPHIL NFR BLD AUTO: 0.2 %
ERYTHROCYTE [DISTWIDTH] IN BLOOD BY AUTOMATED COUNT: 12.8 % (ref 10–15)
GFR SERPL CREATININE-BSD FRML MDRD: 69 ML/MIN/{1.73_M2}
GLUCOSE SERPL-MCNC: 92 MG/DL (ref 70–105)
GLUCOSE UR STRIP-MCNC: NEGATIVE MG/DL
HCT VFR BLD AUTO: 49.4 % (ref 40–53)
HGB BLD-MCNC: 16.7 G/DL (ref 13.3–17.7)
HGB UR QL STRIP: ABNORMAL
IMM GRANULOCYTES # BLD: 0.2 10E9/L (ref 0–0.4)
IMM GRANULOCYTES NFR BLD: 0.9 %
KETONES UR STRIP-MCNC: 40 MG/DL
LABORATORY COMMENT REPORT: NORMAL
LEUKOCYTE ESTERASE UR QL STRIP: NEGATIVE
LYMPHOCYTES # BLD AUTO: 2.2 10E9/L (ref 0.8–5.3)
LYMPHOCYTES NFR BLD AUTO: 13.6 %
MCH RBC QN AUTO: 29 PG (ref 26.5–33)
MCHC RBC AUTO-ENTMCNC: 33.8 G/DL (ref 31.5–36.5)
MCV RBC AUTO: 86 FL (ref 78–100)
MONOCYTES # BLD AUTO: 1.1 10E9/L (ref 0–1.3)
MONOCYTES NFR BLD AUTO: 7 %
MUCOUS THREADS #/AREA URNS LPF: PRESENT /LPF
NEUTROPHILS # BLD AUTO: 12.4 10E9/L (ref 1.6–8.3)
NEUTROPHILS NFR BLD AUTO: 77.9 %
NITRATE UR QL: NEGATIVE
PH UR STRIP: 6 PH (ref 5–7)
PLATELET # BLD AUTO: 251 10E9/L (ref 150–450)
POTASSIUM SERPL-SCNC: 4.3 MMOL/L (ref 3.5–5.1)
RBC # BLD AUTO: 5.75 10E12/L (ref 4.4–5.9)
RBC #/AREA URNS AUTO: >182 /HPF (ref 0–2)
SARS-COV-2 RNA SPEC QL NAA+PROBE: NEGATIVE
SARS-COV-2 RNA SPEC QL NAA+PROBE: NORMAL
SODIUM SERPL-SCNC: 136 MMOL/L (ref 134–144)
SOURCE: ABNORMAL
SP GR UR STRIP: 1.02 (ref 1–1.03)
SPECIMEN SOURCE: NORMAL
SPECIMEN SOURCE: NORMAL
UROBILINOGEN UR STRIP-MCNC: NORMAL MG/DL (ref 0–2)
WBC # BLD AUTO: 15.9 10E9/L (ref 4–11)
WBC #/AREA URNS AUTO: 3 /HPF (ref 0–5)

## 2020-08-31 PROCEDURE — 99234 HOSP IP/OBS SM DT SF/LOW 45: CPT | Performed by: INTERNAL MEDICINE

## 2020-08-31 PROCEDURE — 25000566 ZZH SEVOFLURANE, EA 15 MIN: Performed by: UROLOGY

## 2020-08-31 PROCEDURE — 99285 EMERGENCY DEPT VISIT HI MDM: CPT | Mod: Z6 | Performed by: FAMILY MEDICINE

## 2020-08-31 PROCEDURE — 96375 TX/PRO/DX INJ NEW DRUG ADDON: CPT | Performed by: FAMILY MEDICINE

## 2020-08-31 PROCEDURE — C2617 STENT, NON-COR, TEM W/O DEL: HCPCS | Performed by: UROLOGY

## 2020-08-31 PROCEDURE — 74176 CT ABD & PELVIS W/O CONTRAST: CPT | Mod: TC

## 2020-08-31 PROCEDURE — 25800025 ZZH RX 258: Performed by: UROLOGY

## 2020-08-31 PROCEDURE — 25500064 ZZH RX 255 OP 636: Performed by: UROLOGY

## 2020-08-31 PROCEDURE — 37000009 ZZH ANESTHESIA TECHNICAL FEE, EACH ADDTL 15 MIN: Performed by: UROLOGY

## 2020-08-31 PROCEDURE — 96374 THER/PROPH/DIAG INJ IV PUSH: CPT | Mod: XU | Performed by: FAMILY MEDICINE

## 2020-08-31 PROCEDURE — 40000278 XR SURGERY CARM FLUORO LESS THAN 5 MIN

## 2020-08-31 PROCEDURE — 85025 COMPLETE CBC W/AUTO DIFF WBC: CPT | Performed by: FAMILY MEDICINE

## 2020-08-31 PROCEDURE — 71000014 ZZH RECOVERY PHASE 1 LEVEL 2 FIRST HR: Performed by: UROLOGY

## 2020-08-31 PROCEDURE — 27210794 ZZH OR GENERAL SUPPLY STERILE: Performed by: UROLOGY

## 2020-08-31 PROCEDURE — 36000060 ZZH SURGERY LEVEL 3 W FLUORO 1ST 30 MIN: Performed by: UROLOGY

## 2020-08-31 PROCEDURE — 25000125 ZZHC RX 250: Performed by: INTERNAL MEDICINE

## 2020-08-31 PROCEDURE — 74420 UROGRAPHY RTRGR +-KUB: CPT | Mod: 26 | Performed by: UROLOGY

## 2020-08-31 PROCEDURE — G0378 HOSPITAL OBSERVATION PER HR: HCPCS

## 2020-08-31 PROCEDURE — 52356 CYSTO/URETERO W/LITHOTRIPSY: CPT | Performed by: NURSE ANESTHETIST, CERTIFIED REGISTERED

## 2020-08-31 PROCEDURE — C1758 CATHETER, URETERAL: HCPCS | Performed by: UROLOGY

## 2020-08-31 PROCEDURE — 25000125 ZZHC RX 250: Performed by: NURSE ANESTHETIST, CERTIFIED REGISTERED

## 2020-08-31 PROCEDURE — 36000058 ZZH SURGERY LEVEL 3 EA 15 ADDTL MIN: Performed by: UROLOGY

## 2020-08-31 PROCEDURE — 25000132 ZZH RX MED GY IP 250 OP 250 PS 637: Performed by: FAMILY MEDICINE

## 2020-08-31 PROCEDURE — 36415 COLL VENOUS BLD VENIPUNCTURE: CPT | Performed by: FAMILY MEDICINE

## 2020-08-31 PROCEDURE — 25000128 H RX IP 250 OP 636: Performed by: FAMILY MEDICINE

## 2020-08-31 PROCEDURE — 81001 URINALYSIS AUTO W/SCOPE: CPT | Performed by: FAMILY MEDICINE

## 2020-08-31 PROCEDURE — 25800030 ZZH RX IP 258 OP 636: Performed by: NURSE ANESTHETIST, CERTIFIED REGISTERED

## 2020-08-31 PROCEDURE — 87635 SARS-COV-2 COVID-19 AMP PRB: CPT | Performed by: FAMILY MEDICINE

## 2020-08-31 PROCEDURE — 52356 CYSTO/URETERO W/LITHOTRIPSY: CPT | Performed by: UROLOGY

## 2020-08-31 PROCEDURE — 99204 OFFICE O/P NEW MOD 45 MIN: CPT | Mod: 25 | Performed by: UROLOGY

## 2020-08-31 PROCEDURE — 25000128 H RX IP 250 OP 636: Performed by: NURSE ANESTHETIST, CERTIFIED REGISTERED

## 2020-08-31 PROCEDURE — 80048 BASIC METABOLIC PNL TOTAL CA: CPT | Performed by: FAMILY MEDICINE

## 2020-08-31 PROCEDURE — 25800030 ZZH RX IP 258 OP 636: Performed by: INTERNAL MEDICINE

## 2020-08-31 PROCEDURE — 37000008 ZZH ANESTHESIA TECHNICAL FEE, 1ST 30 MIN: Performed by: UROLOGY

## 2020-08-31 PROCEDURE — C1769 GUIDE WIRE: HCPCS | Performed by: UROLOGY

## 2020-08-31 PROCEDURE — 25000128 H RX IP 250 OP 636

## 2020-08-31 PROCEDURE — 99285 EMERGENCY DEPT VISIT HI MDM: CPT | Mod: 25 | Performed by: FAMILY MEDICINE

## 2020-08-31 DEVICE — STENT URETERAL CONTOUR SOFT PERCUFLEX 6FRX26CM: Type: IMPLANTABLE DEVICE | Site: URETER | Status: FUNCTIONAL

## 2020-08-31 RX ORDER — METOCLOPRAMIDE HYDROCHLORIDE 5 MG/ML
10 INJECTION INTRAMUSCULAR; INTRAVENOUS ONCE
Status: COMPLETED | OUTPATIENT
Start: 2020-08-31 | End: 2020-08-31

## 2020-08-31 RX ORDER — LIDOCAINE HYDROCHLORIDE 20 MG/ML
INJECTION, SOLUTION INFILTRATION; PERINEURAL PRN
Status: DISCONTINUED | OUTPATIENT
Start: 2020-08-31 | End: 2020-08-31

## 2020-08-31 RX ORDER — PROCHLORPERAZINE 25 MG
25 SUPPOSITORY, RECTAL RECTAL EVERY 12 HOURS PRN
Status: DISCONTINUED | OUTPATIENT
Start: 2020-08-31 | End: 2020-08-31 | Stop reason: HOSPADM

## 2020-08-31 RX ORDER — ONDANSETRON 4 MG/1
4 TABLET, ORALLY DISINTEGRATING ORAL EVERY 6 HOURS PRN
Status: DISCONTINUED | OUTPATIENT
Start: 2020-08-31 | End: 2020-08-31 | Stop reason: HOSPADM

## 2020-08-31 RX ORDER — HYDROMORPHONE HYDROCHLORIDE 1 MG/ML
.3-.5 INJECTION, SOLUTION INTRAMUSCULAR; INTRAVENOUS; SUBCUTANEOUS EVERY 5 MIN PRN
Status: DISCONTINUED | OUTPATIENT
Start: 2020-08-31 | End: 2020-08-31 | Stop reason: HOSPADM

## 2020-08-31 RX ORDER — FENTANYL CITRATE 50 UG/ML
25-50 INJECTION, SOLUTION INTRAMUSCULAR; INTRAVENOUS
Status: DISCONTINUED | OUTPATIENT
Start: 2020-08-31 | End: 2020-08-31 | Stop reason: HOSPADM

## 2020-08-31 RX ORDER — PROPOFOL 10 MG/ML
INJECTION, EMULSION INTRAVENOUS PRN
Status: DISCONTINUED | OUTPATIENT
Start: 2020-08-31 | End: 2020-08-31

## 2020-08-31 RX ORDER — SCOLOPAMINE TRANSDERMAL SYSTEM 1 MG/1
1 PATCH, EXTENDED RELEASE TRANSDERMAL
Status: DISCONTINUED | OUTPATIENT
Start: 2020-08-31 | End: 2020-08-31 | Stop reason: HOSPADM

## 2020-08-31 RX ORDER — OXYCODONE HYDROCHLORIDE 5 MG/1
5 TABLET ORAL EVERY 6 HOURS PRN
Qty: 6 TABLET | Refills: 0 | Status: SHIPPED | OUTPATIENT
Start: 2020-08-31 | End: 2020-09-03

## 2020-08-31 RX ORDER — KETOROLAC TROMETHAMINE 30 MG/ML
30 INJECTION, SOLUTION INTRAMUSCULAR; INTRAVENOUS EVERY 6 HOURS PRN
Status: DISCONTINUED | OUTPATIENT
Start: 2020-08-31 | End: 2020-08-31 | Stop reason: HOSPADM

## 2020-08-31 RX ORDER — ACETAMINOPHEN 325 MG/1
650 TABLET ORAL EVERY 4 HOURS PRN
Status: DISCONTINUED | OUTPATIENT
Start: 2020-08-31 | End: 2020-08-31 | Stop reason: HOSPADM

## 2020-08-31 RX ORDER — LIDOCAINE 40 MG/G
CREAM TOPICAL
Status: DISCONTINUED | OUTPATIENT
Start: 2020-08-31 | End: 2020-08-31 | Stop reason: HOSPADM

## 2020-08-31 RX ORDER — MORPHINE SULFATE 2 MG/ML
1 INJECTION, SOLUTION INTRAMUSCULAR; INTRAVENOUS
Status: DISCONTINUED | OUTPATIENT
Start: 2020-08-31 | End: 2020-08-31 | Stop reason: HOSPADM

## 2020-08-31 RX ORDER — SODIUM CHLORIDE 9 MG/ML
INJECTION, SOLUTION INTRAVENOUS CONTINUOUS
Status: DISCONTINUED | OUTPATIENT
Start: 2020-08-31 | End: 2020-08-31 | Stop reason: HOSPADM

## 2020-08-31 RX ORDER — CIPROFLOXACIN 2 MG/ML
400 INJECTION, SOLUTION INTRAVENOUS ONCE
Status: DISCONTINUED | OUTPATIENT
Start: 2020-08-31 | End: 2020-08-31 | Stop reason: HOSPADM

## 2020-08-31 RX ORDER — ONDANSETRON 4 MG/1
4 TABLET, ORALLY DISINTEGRATING ORAL EVERY 30 MIN PRN
Status: DISCONTINUED | OUTPATIENT
Start: 2020-08-31 | End: 2020-08-31 | Stop reason: HOSPADM

## 2020-08-31 RX ORDER — ONDANSETRON 2 MG/ML
4 INJECTION INTRAMUSCULAR; INTRAVENOUS EVERY 6 HOURS PRN
Status: DISCONTINUED | OUTPATIENT
Start: 2020-08-31 | End: 2020-08-31 | Stop reason: HOSPADM

## 2020-08-31 RX ORDER — FENTANYL CITRATE 50 UG/ML
INJECTION, SOLUTION INTRAMUSCULAR; INTRAVENOUS PRN
Status: DISCONTINUED | OUTPATIENT
Start: 2020-08-31 | End: 2020-08-31

## 2020-08-31 RX ORDER — ONDANSETRON 2 MG/ML
4 INJECTION INTRAMUSCULAR; INTRAVENOUS EVERY 30 MIN PRN
Status: DISCONTINUED | OUTPATIENT
Start: 2020-08-31 | End: 2020-08-31 | Stop reason: HOSPADM

## 2020-08-31 RX ORDER — ACETAMINOPHEN 650 MG/1
650 SUPPOSITORY RECTAL EVERY 4 HOURS PRN
Status: DISCONTINUED | OUTPATIENT
Start: 2020-08-31 | End: 2020-08-31 | Stop reason: HOSPADM

## 2020-08-31 RX ORDER — NALOXONE HYDROCHLORIDE 0.4 MG/ML
.1-.4 INJECTION, SOLUTION INTRAMUSCULAR; INTRAVENOUS; SUBCUTANEOUS
Status: DISCONTINUED | OUTPATIENT
Start: 2020-08-31 | End: 2020-08-31 | Stop reason: HOSPADM

## 2020-08-31 RX ORDER — PROCHLORPERAZINE MALEATE 10 MG
10 TABLET ORAL EVERY 6 HOURS PRN
Status: DISCONTINUED | OUTPATIENT
Start: 2020-08-31 | End: 2020-08-31 | Stop reason: HOSPADM

## 2020-08-31 RX ORDER — TAMSULOSIN HYDROCHLORIDE 0.4 MG/1
0.4 CAPSULE ORAL ONCE
Status: COMPLETED | OUTPATIENT
Start: 2020-08-31 | End: 2020-08-31

## 2020-08-31 RX ORDER — SODIUM CHLORIDE, SODIUM LACTATE, POTASSIUM CHLORIDE, CALCIUM CHLORIDE 600; 310; 30; 20 MG/100ML; MG/100ML; MG/100ML; MG/100ML
INJECTION, SOLUTION INTRAVENOUS CONTINUOUS
Status: DISCONTINUED | OUTPATIENT
Start: 2020-08-31 | End: 2020-08-31 | Stop reason: HOSPADM

## 2020-08-31 RX ORDER — OMEPRAZOLE 40 MG/1
40 CAPSULE, DELAYED RELEASE ORAL DAILY PRN
COMMUNITY
End: 2021-01-29

## 2020-08-31 RX ORDER — KETOROLAC TROMETHAMINE 15 MG/ML
15 INJECTION, SOLUTION INTRAMUSCULAR; INTRAVENOUS ONCE
Status: COMPLETED | OUTPATIENT
Start: 2020-08-31 | End: 2020-08-31

## 2020-08-31 RX ORDER — DEXAMETHASONE SODIUM PHOSPHATE 4 MG/ML
INJECTION, SOLUTION INTRA-ARTICULAR; INTRALESIONAL; INTRAMUSCULAR; INTRAVENOUS; SOFT TISSUE PRN
Status: DISCONTINUED | OUTPATIENT
Start: 2020-08-31 | End: 2020-08-31

## 2020-08-31 RX ORDER — ONDANSETRON 2 MG/ML
INJECTION INTRAMUSCULAR; INTRAVENOUS PRN
Status: DISCONTINUED | OUTPATIENT
Start: 2020-08-31 | End: 2020-08-31

## 2020-08-31 RX ADMIN — ONDANSETRON 4 MG: 2 INJECTION INTRAMUSCULAR; INTRAVENOUS at 12:44

## 2020-08-31 RX ADMIN — MORPHINE SULFATE 1 MG: 2 INJECTION, SOLUTION INTRAMUSCULAR; INTRAVENOUS at 09:14

## 2020-08-31 RX ADMIN — SODIUM CHLORIDE 2000 ML: 900 IRRIGANT IRRIGATION at 13:53

## 2020-08-31 RX ADMIN — TAMSULOSIN HYDROCHLORIDE 0.4 MG: 0.4 CAPSULE ORAL at 04:58

## 2020-08-31 RX ADMIN — SCOPALAMINE 1 PATCH: 1 PATCH, EXTENDED RELEASE TRANSDERMAL at 11:28

## 2020-08-31 RX ADMIN — FENTANYL CITRATE 50 MCG: 50 INJECTION, SOLUTION INTRAMUSCULAR; INTRAVENOUS at 12:53

## 2020-08-31 RX ADMIN — SODIUM CHLORIDE: 9 INJECTION, SOLUTION INTRAVENOUS at 09:20

## 2020-08-31 RX ADMIN — PROPOFOL 200 MG: 10 INJECTION, EMULSION INTRAVENOUS at 12:44

## 2020-08-31 RX ADMIN — DEXAMETHASONE SODIUM PHOSPHATE 8 MG: 4 INJECTION, SOLUTION INTRA-ARTICULAR; INTRALESIONAL; INTRAMUSCULAR; INTRAVENOUS; SOFT TISSUE at 12:44

## 2020-08-31 RX ADMIN — METOCLOPRAMIDE HYDROCHLORIDE 10 MG: 5 INJECTION INTRAMUSCULAR; INTRAVENOUS at 04:59

## 2020-08-31 RX ADMIN — FENTANYL CITRATE 50 MCG: 50 INJECTION, SOLUTION INTRAMUSCULAR; INTRAVENOUS at 13:25

## 2020-08-31 RX ADMIN — FENTANYL CITRATE 50 MCG: 50 INJECTION, SOLUTION INTRAMUSCULAR; INTRAVENOUS at 12:49

## 2020-08-31 RX ADMIN — IOHEXOL 4 ML: 300 INJECTION, SOLUTION INTRAVENOUS at 13:42

## 2020-08-31 RX ADMIN — MIDAZOLAM 2 MG: 1 INJECTION INTRAMUSCULAR; INTRAVENOUS at 12:41

## 2020-08-31 RX ADMIN — KETOROLAC TROMETHAMINE 15 MG: 15 INJECTION, SOLUTION INTRAMUSCULAR; INTRAVENOUS at 04:46

## 2020-08-31 RX ADMIN — LIDOCAINE HYDROCHLORIDE 100 MG: 20 INJECTION, SOLUTION INFILTRATION; PERINEURAL at 12:44

## 2020-08-31 RX ADMIN — FENTANYL CITRATE 100 MCG: 50 INJECTION, SOLUTION INTRAMUSCULAR; INTRAVENOUS at 12:44

## 2020-08-31 RX ADMIN — SODIUM CHLORIDE, POTASSIUM CHLORIDE, SODIUM LACTATE AND CALCIUM CHLORIDE: 600; 310; 30; 20 INJECTION, SOLUTION INTRAVENOUS at 12:36

## 2020-08-31 ASSESSMENT — ENCOUNTER SYMPTOMS
COUGH: 0
CHILLS: 0
FEVER: 0
DYSURIA: 0
CHEST TIGHTNESS: 0

## 2020-08-31 ASSESSMENT — MIFFLIN-ST. JEOR: SCORE: 1916.59

## 2020-08-31 NOTE — ANESTHESIA PREPROCEDURE EVALUATION
Anesthesia Pre-Procedure Evaluation    Patient: Hi Kinney   MRN: [id number] : 1995          Preoperative Diagnosis: Right ureteral stone [N20.1]    Procedure(s):  Right ureteroscopy with holmium laser lithotripsy and stent placement    Past Medical History:   Diagnosis Date     Calculus of kidney 2019     Past Surgical History:   Procedure Laterality Date     COMBINED CYSTOSCOPY, URETEROSCOPY, LASER HOLMIUM LITHOTRIPSY URETER(S) Left 3/17/2019    Procedure: COMBINED CYSTOSCOPY, URETEROSCOPY, LASER HOLMIUM LITHOTRIPSY URETER(S)and stent placement;  Surgeon: Veto Peralta MD;  Location:  OR     COMBINED CYSTOSCOPY, URETEROSCOPY, LASER HOLMIUM LITHOTRIPSY URETER(S) Left 2019    Procedure: COMBINED CYSTOSCOPY, URETEROSCOPY, LASER HOLMIUM LITHOTRIPSY URETER(S);  Surgeon: Veto Peralta MD;  Location:  OR     ESOPHAGOSCOPY, GASTROSCOPY, DUODENOSCOPY (EGD), COMBINED N/A 2019    Procedure: ESOPHAGOGASTRODUODENOSCOPY, WITH BIOPSY;  Surgeon: Arnie García MD;  Location:  OR     GRAFT SKIN FULL THICKNESS FROM EXTREMITY Right     right calf     TONSILLECTOMY      No Comments Provided       Anesthesia Evaluation     . Pt has had prior anesthetic. Type: General and MAC    History of anesthetic complications   - PONV        ROS/MED HX    ENT/Pulmonary:  - neg pulmonary ROS     Neurologic:  - neg neurologic ROS     Cardiovascular:  - neg cardiovascular ROS       METS/Exercise Tolerance:  >4 METS   Hematologic:  - neg hematologic  ROS       Musculoskeletal:  - neg musculoskeletal ROS       GI/Hepatic:  - neg GI/hepatic ROS   (+) cholecystitis/cholelithiasis,       Renal/Genitourinary:     (+) Nephrolithiasis ,       Endo:  - neg endo ROS       Psychiatric:  - neg psychiatric ROS       Infectious Disease:  - neg infectious disease ROS       Malignancy:      - no malignancy   Other:    - neg other ROS                      Physical Exam  Normal systems: cardiovascular, pulmonary and  "dental    Airway   Mallampati: II  TM distance: >3 FB  Neck ROM: full    Dental     Cardiovascular   Rhythm and rate: regular and normal      Pulmonary             Lab Results   Component Value Date    WBC 15.9 (H) 08/31/2020    HGB 16.7 08/31/2020    HCT 49.4 08/31/2020     08/31/2020     08/31/2020    POTASSIUM 4.3 08/31/2020    CHLORIDE 101 08/31/2020    CO2 24 08/31/2020    BUN 17 08/31/2020    CR 1.27 08/31/2020    GLC 92 08/31/2020    ALICE 9.4 08/31/2020    PHOS 1.8 (L) 03/25/2019    MAG 1.9 03/25/2019    ALBUMIN 4.5 11/21/2019    PROTTOTAL 7.0 11/21/2019    ALT 29 11/21/2019    AST 15 11/21/2019    ALKPHOS 80 11/21/2019    BILITOTAL 0.9 11/21/2019    LIPASE 9 (L) 11/21/2019    PTT 31 11/17/2019    INR 1.05 11/17/2019       Preop Vitals  BP Readings from Last 3 Encounters:   08/31/20 118/68   08/04/20 108/62   04/08/20 100/68    Pulse Readings from Last 3 Encounters:   08/31/20 82   08/04/20 75   04/08/20 64      Resp Readings from Last 3 Encounters:   08/31/20 18   08/04/20 18   04/08/20 16    SpO2 Readings from Last 3 Encounters:   08/31/20 97%   08/04/20 98%   01/23/20 96%      Temp Readings from Last 1 Encounters:   08/31/20 96.7  F (35.9  C) (Tympanic)    Ht Readings from Last 1 Encounters:   08/31/20 1.727 m (5' 8\")      Wt Readings from Last 1 Encounters:   08/31/20 95.7 kg (211 lb)    Estimated body mass index is 32.08 kg/m  as calculated from the following:    Height as of this encounter: 1.727 m (5' 8\").    Weight as of this encounter: 95.7 kg (211 lb).       Anesthesia Plan      History & Physical Review      ASA Status:  1 .    NPO Status:  > 8 hours    Plan for General with Intravenous induction. Maintenance will be Balanced.    PONV prophylaxis:  Ondansetron (or other 5HT-3), Dexamethasone or Solumedrol and Scopolamine patch  Risks, benefits and alternatives discussed and would like to proceed.           Postoperative Care  Postoperative pain management:  IV analgesics and Multi-modal " analgesia.      Consents  Anesthetic plan, risks, benefits and alternatives discussed with:  Patient.  Use of blood products discussed: No .   .                 Tk Bray CRNA, APRN CRNA

## 2020-08-31 NOTE — PHARMACY - DISCHARGE MEDICATION RECONCILIATION AND EDUCATION
Pharmacy:  Discharge Counseling and Medication Reconciliation    Hi Kinney  [id number]  [facility] [id number] [phone] (home)   25 year old male  PCP: Vero García    Allergies: Augmentin and Cefprozil    Discharge Counseling:    Pharmacist met with patient today to review the medication portion of the After Visit Summary (with an emphasis on NEW medications) and to address patient's questions/concerns.    Summary of Education: Reviewed the new medication of oxycodone with the patient including how the drug works, adverse effects, and proper administration.     Materials Provided:  MedCounselor sheets printed from Clinical Pharmacology on: Oxycodone     Discharge Medication Reconciliation:    It has been determined that the patient has an adequate supply of medications available or which can be obtained from the patient's preferred pharmacy, which HE has confirmed as: [id number]     Thank you for the consult.    Brylee D. Hocking, Pharmacy Intern........August 31, 2020 3:48 PM

## 2020-08-31 NOTE — OR NURSING
Patient report from QUIQUE Spangler on [facility]. Patient transferred to OR1 on [facility] bed with IV fluids infusing. See MAR.

## 2020-08-31 NOTE — ED PROVIDER NOTES
History     Chief Complaint   Patient presents with     Flank Pain     HPI  Hi Kinney is a 25 year old male who presents to the emergency department with recurrent right flank pain.  Patient known to have recurrent ureterolithiasis.  No recent fevers chills.  Vomiting x2 prior to ER presentation.  Urologic history reviewed in epic.    Review nurse's notes below, similar history is related to me.  Pt comes in complaining of R flank pain, vomiting, pt reports symptoms started @ 2030 last night.  Pt has hx of kidney stones.  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     Priority: Medium     Calculus of kidney 04/27/2019     Priority: Medium        Past Medical History:    Past Medical History:   Diagnosis Date     Calculus of kidney 4/27/2019       Past Surgical History:    Past Surgical History:   Procedure Laterality Date     COMBINED CYSTOSCOPY, URETEROSCOPY, LASER HOLMIUM LITHOTRIPSY URETER(S) Left 3/17/2019    Procedure: COMBINED CYSTOSCOPY, URETEROSCOPY, LASER HOLMIUM LITHOTRIPSY URETER(S)and stent placement;  Surgeon: Veto Peralta MD;  Location:  OR     COMBINED CYSTOSCOPY, URETEROSCOPY, LASER HOLMIUM LITHOTRIPSY URETER(S) Left 11/17/2019    Procedure: COMBINED CYSTOSCOPY, URETEROSCOPY, LASER HOLMIUM LITHOTRIPSY URETER(S);  Surgeon: Veto Peralta MD;  Location:  OR     ESOPHAGOSCOPY, GASTROSCOPY, DUODENOSCOPY (EGD), COMBINED N/A 11/22/2019    Procedure: ESOPHAGOGASTRODUODENOSCOPY, WITH BIOPSY;  Surgeon: Arnie García MD;  Location:  OR     GRAFT SKIN FULL THICKNESS FROM EXTREMITY Right     right calf     TONSILLECTOMY      No Comments Provided       Family  "History:    History reviewed. No pertinent family history.    Social History:  Marital Status:  Single [1]  Social History     Tobacco Use     Smoking status: Never Smoker     Smokeless tobacco: Never Used   Substance Use Topics     Alcohol use: Yes     Comment: rare     Drug use: No        Medications:    hydrOXYzine (ATARAX) 25 MG tablet  omeprazole (PRILOSEC) 40 MG DR capsule  traZODone (DESYREL) 50 MG tablet          Review of Systems   Constitutional: Negative for chills and fever.   Respiratory: Negative for cough and chest tightness.    Genitourinary: Negative for dysuria.       Physical Exam   BP: (!) 142/96  Pulse: 98  Temp: 95.5  F (35.3  C)  Resp: 20  Height: 172.7 cm (5' 8\")  Weight: 95.7 kg (211 lb)  SpO2: 99 %      Physical Exam  Vitals signs and nursing note reviewed.   Constitutional:       General: He is in acute distress.      Appearance: He is not diaphoretic.   HENT:      Head: Atraumatic.   Eyes:      General: No scleral icterus.     Pupils: Pupils are equal, round, and reactive to light.   Cardiovascular:      Heart sounds: Normal heart sounds.   Pulmonary:      Effort: No respiratory distress.      Breath sounds: Normal breath sounds.   Abdominal:      General: Bowel sounds are normal.      Palpations: Abdomen is soft.      Tenderness: There is no abdominal tenderness.   Musculoskeletal:         General: No tenderness.   Skin:     General: Skin is warm.      Findings: No rash.         ED Course        Procedures      Results for orders placed or performed during the hospital encounter of 08/31/20 (from the past 24 hour(s))   UA reflex to Microscopic   Result Value Ref Range    Color Urine Light Yellow     Appearance Urine Slightly Cloudy     Glucose Urine Negative NEG^Negative mg/dL    Bilirubin Urine Negative NEG^Negative    Ketones Urine 40 (A) NEG^Negative mg/dL    Specific Gravity Urine 1.024 1.003 - 1.035    Blood Urine Large (A) NEG^Negative    pH Urine 6.0 5.0 - 7.0 pH    Protein " Albumin Urine 10 (A) NEG^Negative mg/dL    Urobilinogen mg/dL Normal 0.0 - 2.0 mg/dL    Nitrite Urine Negative NEG^Negative    Leukocyte Esterase Urine Negative NEG^Negative    Source Midstream Urine     RBC Urine >182 (H) 0 - 2 /HPF    WBC Urine 3 0 - 5 /HPF    Mucous Urine Present (A) NEG^Negative /LPF   CT Abdomen Pelvis w/o Contrast    Narrative    Addendum created by Radha Villafana MD on 8/31/2020 6:10:06 AM CDT:  CORRECTION:      Impression    IMPRESSION:   1. LARGE OBSTRUCTIVE MID-DISTAL JUNCTION RIGHT URETERIC CALCULUS MEASURING 10 X   10 X 8 MM.   2. Other nonemergent/incidental findings as described.   _______________________________________________________        Initial report created on 8/31/2020 6:02:13 AM CDT:    PROCEDURE INFORMATION:   Exam: CT Abdomen And Pelvis Without Contrast   Exam date and time: 8/31/2020 5:05 AM   Age: 25 years old   Clinical indication: Other: Right flank pain; Prior surgery; Surgery date: 6+   months; Surgery type: Combined cystoscopy, ureteroscopy, laser holmium   lithotripsy ureter (left); Additional info: Flank pain, recurrent stone disease   suspected - right     TECHNIQUE:   Imaging protocol: Computed tomography of the abdomen and pelvis without   contrast.   Radiation optimization: All CT scans at this facility use at least one of these   dose optimization techniques: automated exposure control; mA and/or kV   adjustment per patient size (includes targeted exams where dose is matched to   clinical indication); or iterative reconstruction.     COMPARISON:   No relevant prior studies available.     FINDINGS:    PANCREATICOHEPATOBILIARY: The liver is enlarged without a focal intrahepatic   mass or abnormality. No significant intra-or extrahepatic ductal dilation.   Gallbladder, pancreas and spleen are unremarkable.    .    GENITOURINARY: No adrenal mass. 10 x 10 x 8 mm RIGHT ureteric calculus at the   junction of the mid and distal ureter with moderately severe RIGHT sided    hydronephrosis and hydroureter. Empty urinary bladder. No free fluid in the   pelvis.    .    GASTROINTESTINAL: A few colonic diverticula. Colon contains moderate amount of   fecal matter and air. No free intraperitoneal air or fluid collection. A normal   APPENDIX is visualized.    .    OTHER STRUCTURES: Aorta appears unremarkable without evidence of aortic   aneurysm. No bulky lymph node enlargement. Mild chronic compression deformity   of superior endplate of L1 and prominent Schmorl's node along the inferior   endplate of L4.     IMPRESSION:   1. LARGE OBSTRUCTIVE MID RIGHT URETERIC CALCULUS MEASURING 10 X 10 X 8 MM.   2. Other nonemergent/incidental findings as described.     COMMENTS:   Suboptimal evaluation of bowel loops and abdominal organs due to lack of   intravenous and oral contrast.     THIS DOCUMENT HAS BEEN ELECTRONICALLY SIGNED BY ELIZABETH TORIBIO MD   CBC with platelets differential   Result Value Ref Range    WBC 15.9 (H) 4.0 - 11.0 10e9/L    RBC Count 5.75 4.4 - 5.9 10e12/L    Hemoglobin 16.7 13.3 - 17.7 g/dL    Hematocrit 49.4 40.0 - 53.0 %    MCV 86 78 - 100 fl    MCH 29.0 26.5 - 33.0 pg    MCHC 33.8 31.5 - 36.5 g/dL    RDW 12.8 10.0 - 15.0 %    Platelet Count 251 150 - 450 10e9/L    Diff Method Automated Method     % Neutrophils 77.9 %    % Lymphocytes 13.6 %    % Monocytes 7.0 %    % Eosinophils 0.2 %    % Basophils 0.4 %    % Immature Granulocytes 0.9 %    Absolute Neutrophil 12.4 (H) 1.6 - 8.3 10e9/L    Absolute Lymphocytes 2.2 0.8 - 5.3 10e9/L    Absolute Monocytes 1.1 0.0 - 1.3 10e9/L    Absolute Eosinophils 0.0 0.0 - 0.7 10e9/L    Absolute Basophils 0.1 0.0 - 0.2 10e9/L    Abs Immature Granulocytes 0.2 0 - 0.4 10e9/L       Medications   ketorolac (TORADOL) injection 15 mg (15 mg Intravenous Given 8/31/20 1076)   tamsulosin (FLOMAX) capsule 0.4 mg (0.4 mg Oral Given 8/31/20 1670)   metoclopramide (REGLAN) injection 10 mg (10 mg Intravenous Given 8/31/20 5349)       Assessments & Plan (with  Medical Decision Making)     I have reviewed the nursing notes.    I have reviewed the findings, diagnosis, plan and need for follow up with the patient.  ED course: IV access established rapidly and patient given 15 mg of IV Toradol with significant relief.  Still has considerable amount of pain however.  Flomax x1 here in the ED as well as IV Reglan.  White count mildly elevated but patient has been vomiting.  Urinalysis shows no pyuria, patient is afebrile and I have low clinical suspicion for concomitant urinary tract infection.  Nonetheless given size of stone spontaneous passage is dubious.  Anticipate need for recurrent stenting.  New Prescriptions    No medications on file     6:25 AM--I called the house supervisor, Dr. Peralta is in clinic today.  Patient prefers to see Dr. Peralta     6:38 AM--D/w Dr Harvey, admit for pain control anticipate seeing tamiko today    Final diagnoses:   Ureterolithiasis       8/31/2020   [facility]     Zelalem Pan MD  08/31/20 0639       Zelalem Pan MD  08/31/20 0646

## 2020-08-31 NOTE — ED TRIAGE NOTES
Pt comes in complaining of R flank pain, vomiting, pt reports symptoms started @ 2030 last night.  Pt has hx of kidney stones.

## 2020-08-31 NOTE — PHARMACY-ADMISSION MEDICATION HISTORY
Pharmacy -- Admission Medication Reconciliation    Prior to admission (PTA) medications were reviewed and the patient's PTA medication list was updated.    Sources Consulted: Patient Interview, Chart Review    The reliability of this Medication Reconciliation is: Reliability: Reliable    The following significant changes were made:  Will changed Omeprazole to PRN- patient has not taken since April but plans on refilling and using if needed again.   Removed trazodone as patient no longer takes.     In addition, the patient's allergies were reviewed with the patient and updated as follows:   Allergies: Cefprozil and Augmentin    The pharmacist has reviewed with the patient that all personal medications should be removed from the building or locked in the belongings safe.  Patient shall only take medications ordered by the physician and administered by the nursing staff.       Medication barriers identified: None noted. Patient states that he has insurance.    Medication adherence concerns: None noted.    Understanding of emergency medications: YOHANA [facility], 8/31/2020,  8:44 AM

## 2020-08-31 NOTE — OR NURSING
PACU Transfer Note    Hi Kinney was transferred to [facility] via cart.  Equipment used for transport:  none.  Accompanied by:  rn  Prescriptions were: none    PACU Respiratory Event Documentation     1) Episodes of Apnea greater than or equal to 10 seconds: none    2) Bradypnea - less than 8 breaths per minute: 0    3) Pain score on 0 to 10 scale: 0    4) Pain-sedation mismatch (yes or no): no    5) Repeated 02 desaturation less than 90% (yes or no): no    Anesthesia notified? (yes or no): no    Any of the above events occuring repeatedly in separate 30 minute intervals may be considered recurrent PACU respiratory events.    Patient stable and meets phase 1 discharge criteria for transport from PACU.

## 2020-08-31 NOTE — OP NOTE
Preoperative diagnosis  Right  ureteral stone    Postoperative diagnosis  Right  ureteral stone    Procedure performed  Right  ureteroscopy with holmium-YAG laser lithotripsy and basket extraction of stone fragments  Cystoscopy with right retrograde pyelogram and ureteral stent placement  Interpretation of retrograde    Surgeon  Veto Peralta MD    Surgeon(s)/Proceduralist(s) and Assistants (if any)  Surgeon(s):  Veto Peralta MD  Circulator: Celena Burrell RN  : Lashae García  Scrub Person: Kasey Nieves  X-Ray Technologist: Patricia Velazquez  First Assistant: Jed Rios, QUIQUE; Madelin Hall RN    Specimen(s)  Yes  Stone fragments for biochemical analysis    (EBL) Estimated blood loss (ml)  5    Anesthesia  General    Complications  None    Findings  Cystoscopy revealed no tumors, stones or other mucosal abnormalities.  Retrograde pyelogram revealed a  moderately dilated system with identification of the stone seen on imaging.    Indications  25 year old male agreed to undergo the above named procedure after discussion of the alternatives, risks and benefits.    Informed consent was obtained.      Procedure  The patient was taken to the operating room and placed supine on the operating table.  Pre-operative antibiotics were administered.  Bilateral lower extremity SCDs were placed.  After induction of general anesthesia the patient was positioned in dorsal lithotomy, prepped and draped in a sterile fashion.  A time-out was performed.      I passed a 14 Azeri flexible cystoscope carefully via urethra into the bladder.  The right ureteral orifice was identified and a Sensor wire was passed retrograde to the level of the kidney and confirmed by fluoroscopy.  The flexible scope was off-loaded and the bladder emptied with a straight catheter.  An 8-10 coaxial dilator was passed without difficulty and then removed.  The MR6A semirigid ureteroscope was passed carefully along the Sensor wire through  the urethra and into the distal ureter.  The stone was encountered and fragmented with a 200-micron holmium YAG laser fiber at laser settings of 0.8 joules and a frequency of 8 Hz.  The fragments were basket extracted.  At the completion of the procedure, all clinically significant fragments were removed from the ureter and only dust-like fragments remained.  The ureteroscope was withdrawn.  A 5-Romanian open-ended was passed over the wire and the wire removed.  A retrograde pyelogram was performed by slowly injecting 5 mL of 50% Omnipaque contrast via the 5 Romanian catheter with findings described above.  An Amplatz super-stiff was placed to the level of the renal pelvis confirmed by fluoroscopy.  A 6 x 26 Romanian stent was positioned with the upper end in the upper pole and the lower in the bladder confirmed by fluoroscopy. The bladder was emptied and the procedure was complete. The patient tolerated the procedure well and was stable throughout.    Plan  Follow up in clinic for stent pull in the next week or so.

## 2020-08-31 NOTE — ANESTHESIA CARE TRANSFER NOTE
Patient: Hi Kinney    Procedure(s):  Right ureteroscopy with holmium laser lithotripsy and stent placement    Diagnosis: Right ureteral stone [N20.1]  Diagnosis Additional Information: No value filed.    Anesthesia Type:   General     Note:  Airway :Face Mask  Patient transferred to:PACU  Handoff Report: Identifed the Patient, Identified the Reponsible Provider, Reviewed the pertinent medical history, Discussed the surgical course, Reviewed Intra-OP anesthesia mangement and issues during anesthesia, Set expectations for post-procedure period and Allowed opportunity for questions and acknowledgement of understanding      Vitals: (Last set prior to Anesthesia Care Transfer)    CRNA VITALS  8/31/2020 1322 - 8/31/2020 1358      8/31/2020             Resp Rate (set):  10                Electronically Signed By: Tk Bray CRNA, APRN CRNA  August 31, 2020  1:58 PM

## 2020-08-31 NOTE — DISCHARGE SUMMARY
"[facility]    Discharge Summary  Hospitalist    Date of Admission:  8/31/2020  Date of Discharge:  8/31/2020  Discharging Provider: Hasmukh Baker  Date of Service (when I saw the patient): 08/31/20    Discharge Diagnoses   Principal Problem:    Right ureteral stone (8/31/2020)  Active Problems:    Ureterolithiasis (8/31/2020)      History of Present Illness   Hi Kinney is an 25 year old male who presented with right flank pain. Per the H&P:  \"Hi Kinney is a 25 year old male who presents with acute onset of right flank pain last night at 2030.  He was sitting around a campfire when the symptoms started.  He has a history of recurrent ureteral lithiasis.  He has had 3 episodes in the past.  His pain was severe enough that he was vomiting.  No fevers or chills.  No hematuria.     He presented to the emergency department and was hemodynamically stable.  A CT of the abdomen and pelvis shows a 10 x 10 x 8 mm obstructing mid ureteral stone with severe hydronephrosis.\"    Hospital Course   Hi Kinney was admitted on 8/31/2020.  The following problems were addressed during his hospitalization:    Right ureteral stone  The patient underwent Right  ureteroscopy with holmium-YAG laser lithotripsy and basket extraction of stone fragments, and then Cystoscopy with right retrograde pyelogram and ureteral stent placement. He tolerated this well. He was discharged with 6 oxycodone and a follow up 1 week stent pull with Dr. Peralta.     Hasmukh Baker MD      Code Status   Full Code       Primary Care Physician   Vero García    Physical Exam   Temp: 98.5  F (36.9  C) Temp src: Tympanic BP: 117/71 Pulse: 96   Resp: 20 SpO2: 96 % O2 Device: None (Room air) Oxygen Delivery: 6 LPM  Vitals:    08/31/20 0440   Weight: 95.7 kg (211 lb)     Vital Signs with Ranges  Temp:  [95.5  F (35.3  C)-98.5  F (36.9  C)] 98.5  F (36.9  C)  Pulse:  [] 96  Resp:  [16-20] 20  BP: ()/(54-96) " 117/71  SpO2:  [94 %-100 %] 96 %  No intake/output data recorded.    GENERAL: Comfortable, no apparent distress.  CARDIOVASCULAR: regular rate and rhythm, no murmur. No lower extremity edema   RESPIRATORY: Clear to auscultation bilaterally, no wheezes or crackles.  GI: non-tender, non-distended, normal bowel sounds.   SKIN: warm periphery, no rashes      Discharge Disposition   Discharged to home  Condition at discharge: Stable    Consultations This Hospital Stay   None    Time Spent on this Encounter   I, Hasmukh Baker MD, personally saw the patient today and spent greater than 30 minutes discharging this patient.    Discharge Orders      Reason for your hospital stay    Kidney stone     Follow-up and recommended labs and tests    Follow up with Dr. Peralta on 9/10 at 8AM.     Activity    Your activity upon discharge: activity as tolerated     Discharge Instructions    Expect some bloody urine. Any worsening pain or inability to urinate - come to ER     Full Code     Diet    Follow this diet upon discharge: Orders Placed This Encounter      NPO for Medical/Clinical Reasons Except for: Ice Chips, Meds       Discharge Medications   Current Discharge Medication List      START taking these medications    Details   oxyCODONE (ROXICODONE) 5 MG tablet Take 1 tablet (5 mg) by mouth every 6 hours as needed for severe pain  Qty: 6 tablet, Refills: 0    Associated Diagnoses: Right ureteral stone         CONTINUE these medications which have NOT CHANGED    Details   hydrOXYzine (ATARAX) 25 MG tablet Take 1 tablet (25 mg) by mouth 3 times daily as needed for itching  Qty: 60 tablet, Refills: 0    Associated Diagnoses: Anxiety      omeprazole (PRILOSEC) 40 MG DR capsule Take 40 mg by mouth daily as needed           Allergies   Allergies   Allergen Reactions     Augmentin Rash     Childhood reaction     Cefprozil Rash     Other reaction(s): *Unknown - Childhood Rxn     Data   Most Recent 3 CBC's:  Recent Labs   Lab Test  08/31/20  0628 08/04/20  1349 11/21/19 1930   WBC 15.9* 7.9 8.5   HGB 16.7 17.5 16.2   MCV 86 86 85    236 210      Most Recent 3 BMP's:  Recent Labs   Lab Test 08/31/20  0628 11/21/19 1930 11/17/19  0055    135 140   POTASSIUM 4.3 3.6 3.3*   CHLORIDE 101 103 104   CO2 24 21 25   BUN 17 12 12   CR 1.27 0.95 1.08   ANIONGAP 11 11 11   ALICE 9.4 8.9 9.7   GLC 92 102 109*     Most Recent 2 LFT's:  Recent Labs   Lab Test 11/21/19 1930 09/21/19  1309   AST 15 14   ALT 29 30   ALKPHOS 80 80   BILITOTAL 0.9 0.5     Most Recent INR's and Anticoagulation Dosing History:  Anticoagulation Dose History     Recent Dosing and Labs Latest Ref Rng & Units 11/17/2019    INR 0 - 1.3 1.05        Most Recent 3 Troponin's:No lab results found.  Most Recent Cholesterol Panel:No lab results found.  Most Recent 6 Bacteria Isolates From Any Culture (See EPIC Reports for Culture Details):  Recent Labs   Lab Test 11/21/19 1930 11/21/19  1715   CULT No growth after 6 days  No growth after 6 days Urine culture negative (no growth of uropathogens).     Most Recent TSH, T4 and A1c Labs:No lab results found.  Results for orders placed or performed during the hospital encounter of 08/31/20   CT Abdomen Pelvis w/o Contrast    Narrative    Addendum created by Radha Villafana MD on 8/31/2020 6:10:06 AM CDT:  CORRECTION:      Impression    IMPRESSION:   1. LARGE OBSTRUCTIVE MID-DISTAL JUNCTION RIGHT URETERIC CALCULUS MEASURING 10 X   10 X 8 MM.   2. Other nonemergent/incidental findings as described.   _______________________________________________________        Initial report created on 8/31/2020 6:02:13 AM CDT:    PROCEDURE INFORMATION:   Exam: CT Abdomen And Pelvis Without Contrast   Exam date and time: 8/31/2020 5:05 AM   Age: 25 years old   Clinical indication: Other: Right flank pain; Prior surgery; Surgery date: 6+   months; Surgery type: Combined cystoscopy, ureteroscopy, laser holmium   lithotripsy ureter (left); Additional  info: Flank pain, recurrent stone disease   suspected - right     TECHNIQUE:   Imaging protocol: Computed tomography of the abdomen and pelvis without   contrast.   Radiation optimization: All CT scans at this facility use at least one of these   dose optimization techniques: automated exposure control; mA and/or kV   adjustment per patient size (includes targeted exams where dose is matched to   clinical indication); or iterative reconstruction.     COMPARISON:   No relevant prior studies available.     FINDINGS:    PANCREATICOHEPATOBILIARY: The liver is enlarged without a focal intrahepatic   mass or abnormality. No significant intra-or extrahepatic ductal dilation.   Gallbladder, pancreas and spleen are unremarkable.    .    GENITOURINARY: No adrenal mass. 10 x 10 x 8 mm RIGHT ureteric calculus at the   junction of the mid and distal ureter with moderately severe RIGHT sided   hydronephrosis and hydroureter. Empty urinary bladder. No free fluid in the   pelvis.    .    GASTROINTESTINAL: A few colonic diverticula. Colon contains moderate amount of   fecal matter and air. No free intraperitoneal air or fluid collection. A normal   APPENDIX is visualized.    .    OTHER STRUCTURES: Aorta appears unremarkable without evidence of aortic   aneurysm. No bulky lymph node enlargement. Mild chronic compression deformity   of superior endplate of L1 and prominent Schmorl's node along the inferior   endplate of L4.     IMPRESSION:   1. LARGE OBSTRUCTIVE MID RIGHT URETERIC CALCULUS MEASURING 10 X 10 X 8 MM.   2. Other nonemergent/incidental findings as described.     COMMENTS:   Suboptimal evaluation of bowel loops and abdominal organs due to lack of   intravenous and oral contrast.     THIS DOCUMENT HAS BEEN ELECTRONICALLY SIGNED BY ELIZABETH TOIRBIO MD   XR Surgery FRANCINE L/T 5 Min Fluoro    Narrative    This exam was marked as non-reportable because it will not be read by a   radiologist or a [facility] non-radiologist  provider.

## 2020-08-31 NOTE — CONSULTS
I was asked to see this patient by Dr Baker and provide my opinion about the following:  Ureteral stone    Type of Visit  Consult    Chief Complaint  Ureteral stone    HPI  Mr. Kinney is a 25 year old male who presents with right ureteral stone  The patient initially presented to the ED overnight.  At that time the patient underwent a CT scan which revealed a 10 mm obstructing stone.  The patient currently denies fevers or chills.  The patient currently denies nausea or vomiting.  The patient has undergone surgery in the past for stones.    Pain ROS  Location:  Right flank  Quality:    Sharp and Dull  Provocative factors:  Nothing makes it worse  Palliative factors:   Narcotics make it better  Radiation:   None  Severity:   4/10 currently and 8/10 at its worst  Time:     Pain started 1 day(s) ago      Past Medical History  He  has a past medical history of Calculus of kidney (4/27/2019).  Patient Active Problem List   Diagnosis     Calculus of kidney     Hypocitraturia     Ureteral stone with hydronephrosis     Chronic cholecystitis     Ureterolithiasis       Past Surgical History  He  has a past surgical history that includes Tonsillectomy; Combined Cystoscopy, Ureteroscopy, Laser Holmium Lithotripsy Ureter(S) (Left, 3/17/2019); Graft skin full thickness from extremity (Right); Combined Cystoscopy, Ureteroscopy, Laser Holmium Lithotripsy Ureter(S) (Left, 11/17/2019); and Esophagoscopy, gastroscopy, duodenoscopy (EGD), combined (N/A, 11/22/2019).    Medications    Current Facility-Administered Medications:      acetaminophen (TYLENOL) Suppository 650 mg, 650 mg, Rectal, Q4H PRN, Hasmukh Baker MD     acetaminophen (TYLENOL) tablet 650 mg, 650 mg, Oral, Q4H PRN, Hasmukh Baker MD     ketorolac (TORADOL) injection 30 mg, 30 mg, Intravenous, Q6H PRN, Hasmukh Baker MD     morphine (PF) injection 1 mg, 1 mg, Intravenous, Q2H PRN, Hasmukh Baker MD     naloxone (NARCAN) injection 0.1-0.4 mg, 0.1-0.4 mg,  Intravenous, Q2 Min PRN, Hasmukh Baker MD     ondansetron (ZOFRAN-ODT) ODT tab 4 mg, 4 mg, Oral, Q6H PRN **OR** ondansetron (ZOFRAN) injection 4 mg, 4 mg, Intravenous, Q6H PRN, Hasmukh Baker MD     prochlorperazine (COMPAZINE) injection 10 mg, 10 mg, Intravenous, Q6H PRN **OR** prochlorperazine (COMPAZINE) tablet 10 mg, 10 mg, Oral, Q6H PRN **OR** prochlorperazine (COMPAZINE) suppository 25 mg, 25 mg, Rectal, Q12H PRN, Hasmukh Baker MD     sodium chloride 0.9% infusion, , Intravenous, Continuous, Hasmukh Baker MD      Allergies  Allergies   Allergen Reactions     Cefprozil      Other reaction(s): *Unknown - Childhood Rxn     Augmentin Rash     Childhood reaction       Social History  He  reports that he has never smoked. He has never used smokeless tobacco. He reports current alcohol use. He reports that he does not use drugs.  No drug abuse.    Family History  History reviewed. No pertinent family history.    Review of Systems  I personally reviewed the ROS with the patient.    Unintentional weight loss:  No  Recent fever/chills: No  Night sweats: No   Current skin rash: No   Recent hair loss: No   Heat intolerance: No   Cold intolerance: No   Chest pain: No   Palpitations: No   Shortness of breath: No  Wheezing: No   Constipation: No   Diarrhea: No   Nausea: Yes    Vomiting: Yes   Kidney/side pain: Yes    Back pain: No  Frequent headaches: No  Dizziness: No   Leg swelling: No   Calf pain: No    Physical Exam  Vitals:    08/31/20 0645 08/31/20 0700 08/31/20 0754 08/31/20 0838   BP: 111/72 110/54 119/64 118/68   Pulse: 84 87 94 82   Resp:   18 18   Temp:   96.9  F (36.1  C) 96.7  F (35.9  C)   TempSrc:   Tympanic Tympanic   SpO2: 97% 95% 94% 97%   Weight:       Height:         Constitutional: No acute distress.  Alert and cooperative   Head: NCAT  Eyes: Conjunctivae normal  Cardiovascular: Regular rate.  Pulmonary/Chest: Respirations are even and non-labored bilaterally, no audible  wheezing  Abdominal: Soft. No distension, tenderness, masses or guarding.   Neurological: A + O x 3.  Cranial Nerves II-XII grossly intact.  Extremities: ADELA x 4, Warm. No clubbing.  No cyanosis.    Skin: Pink, warm and dry.  No visible rashes noted.  Psychiatric:  Normal mood and affect  Back:  - left CVA tenderness.  + right CVA tenderness.  Genitourinary: nonpalpable bladder    Labs  Results for orders placed or performed during the hospital encounter of 08/31/20   CT Abdomen Pelvis w/o Contrast     Status: None    Narrative    Addendum created by Radha Villafana MD on 8/31/2020 6:10:06 AM CDT:  CORRECTION:      Impression    IMPRESSION:   1. LARGE OBSTRUCTIVE MID-DISTAL JUNCTION RIGHT URETERIC CALCULUS MEASURING 10 X   10 X 8 MM.   2. Other nonemergent/incidental findings as described.   _______________________________________________________        Initial report created on 8/31/2020 6:02:13 AM CDT:    PROCEDURE INFORMATION:   Exam: CT Abdomen And Pelvis Without Contrast   Exam date and time: 8/31/2020 5:05 AM   Age: 25 years old   Clinical indication: Other: Right flank pain; Prior surgery; Surgery date: 6+   months; Surgery type: Combined cystoscopy, ureteroscopy, laser holmium   lithotripsy ureter (left); Additional info: Flank pain, recurrent stone disease   suspected - right     TECHNIQUE:   Imaging protocol: Computed tomography of the abdomen and pelvis without   contrast.   Radiation optimization: All CT scans at this facility use at least one of these   dose optimization techniques: automated exposure control; mA and/or kV   adjustment per patient size (includes targeted exams where dose is matched to   clinical indication); or iterative reconstruction.     COMPARISON:   No relevant prior studies available.     FINDINGS:    PANCREATICOHEPATOBILIARY: The liver is enlarged without a focal intrahepatic   mass or abnormality. No significant intra-or extrahepatic ductal dilation.   Gallbladder, pancreas and  spleen are unremarkable.    .    GENITOURINARY: No adrenal mass. 10 x 10 x 8 mm RIGHT ureteric calculus at the   junction of the mid and distal ureter with moderately severe RIGHT sided   hydronephrosis and hydroureter. Empty urinary bladder. No free fluid in the   pelvis.    .    GASTROINTESTINAL: A few colonic diverticula. Colon contains moderate amount of   fecal matter and air. No free intraperitoneal air or fluid collection. A normal   APPENDIX is visualized.    .    OTHER STRUCTURES: Aorta appears unremarkable without evidence of aortic   aneurysm. No bulky lymph node enlargement. Mild chronic compression deformity   of superior endplate of L1 and prominent Schmorl's node along the inferior   endplate of L4.     IMPRESSION:   1. LARGE OBSTRUCTIVE MID RIGHT URETERIC CALCULUS MEASURING 10 X 10 X 8 MM.   2. Other nonemergent/incidental findings as described.     COMMENTS:   Suboptimal evaluation of bowel loops and abdominal organs due to lack of   intravenous and oral contrast.     THIS DOCUMENT HAS BEEN ELECTRONICALLY SIGNED BY ELIZABETH TORIBIO MD   UA reflex to Microscopic     Status: Abnormal   Result Value Ref Range    Color Urine Light Yellow     Appearance Urine Slightly Cloudy     Glucose Urine Negative NEG^Negative mg/dL    Bilirubin Urine Negative NEG^Negative    Ketones Urine 40 (A) NEG^Negative mg/dL    Specific Gravity Urine 1.024 1.003 - 1.035    Blood Urine Large (A) NEG^Negative    pH Urine 6.0 5.0 - 7.0 pH    Protein Albumin Urine 10 (A) NEG^Negative mg/dL    Urobilinogen mg/dL Normal 0.0 - 2.0 mg/dL    Nitrite Urine Negative NEG^Negative    Leukocyte Esterase Urine Negative NEG^Negative    Source Midstream Urine     RBC Urine >182 (H) 0 - 2 /HPF    WBC Urine 3 0 - 5 /HPF    Mucous Urine Present (A) NEG^Negative /LPF   CBC with platelets differential     Status: Abnormal   Result Value Ref Range    WBC 15.9 (H) 4.0 - 11.0 10e9/L    RBC Count 5.75 4.4 - 5.9 10e12/L    Hemoglobin 16.7 13.3 - 17.7 g/dL     Hematocrit 49.4 40.0 - 53.0 %    MCV 86 78 - 100 fl    MCH 29.0 26.5 - 33.0 pg    MCHC 33.8 31.5 - 36.5 g/dL    RDW 12.8 10.0 - 15.0 %    Platelet Count 251 150 - 450 10e9/L    Diff Method Automated Method     % Neutrophils 77.9 %    % Lymphocytes 13.6 %    % Monocytes 7.0 %    % Eosinophils 0.2 %    % Basophils 0.4 %    % Immature Granulocytes 0.9 %    Absolute Neutrophil 12.4 (H) 1.6 - 8.3 10e9/L    Absolute Lymphocytes 2.2 0.8 - 5.3 10e9/L    Absolute Monocytes 1.1 0.0 - 1.3 10e9/L    Absolute Eosinophils 0.0 0.0 - 0.7 10e9/L    Absolute Basophils 0.1 0.0 - 0.2 10e9/L    Abs Immature Granulocytes 0.2 0 - 0.4 10e9/L   Basic metabolic panel     Status: None   Result Value Ref Range    Sodium 136 134 - 144 mmol/L    Potassium 4.3 3.5 - 5.1 mmol/L    Chloride 101 98 - 107 mmol/L    Carbon Dioxide 24 21 - 31 mmol/L    Anion Gap 11 3 - 14 mmol/L    Glucose 92 70 - 105 mg/dL    Urea Nitrogen 17 7 - 25 mg/dL    Creatinine 1.27 0.70 - 1.30 mg/dL    GFR Estimate 69 >60 mL/min/[1.73_m2]    GFR Estimate If Black 84 >60 mL/min/[1.73_m2]    Calcium 9.4 8.6 - 10.3 mg/dL   Asymptomatic COVID-19 Virus (Coronavirus) by PCR     Status: None    Specimen: Nasopharyngeal   Result Value Ref Range    COVID-19 Virus PCR to [facility] - Source Nasopharyngeal     COVID-19 Virus PCR to [facility] - Result       Test received-See reflex to [facility] test SARS CoV2 (COVID-19) Virus RT-PCR   SARS-CoV-2 COVID-19 Virus (Coronavirus) RT-PCR Nasopharyngeal     Status: None    Specimen: Nasopharyngeal   Result Value Ref Range    SARS-CoV-2 Virus Specimen Source Nasopharyngeal     SARS-CoV-2 PCR Result NEGATIVE     SARS-CoV-2 PCR Comment       Testing was performed using the 800APP Xpress SARS-CoV-2 Assay on the Cepheid Gene-Xpert   Instrument Systems. Additional information about this Emergency Use Authorization (EUA)   assay can be found via the Lab Guide.         Imaging  CT stone study   8/31/2020  I personally reviewed and interpreted the images  "and report.   IMPRESSION:   1. LARGE OBSTRUCTIVE MID RIGHT URETERIC CALCULUS MEASURING 10 X 10 X 8 MM.   2. Other nonemergent/incidental findings as described.     Assessment  Mr. Kinney is a 25 year old male who presents with symptomatic right  ureteral stone.    Discussed the treatment options for the ureteral stone including ESWL and ureteroscopy with laser lithotripsy.    After explaining the risks, benefits, and alternatives a decision was made to proceed with ureteroscopy/laser lithotripsy.    The patient was explained the specific risks of bleeding, pain, infection, and ureteral injury.    In addition, the patient was told a stent may need to be placed which could result in urinary frequency, urgency, dysuria, and flank pain with voiding.    It would require an office based procedure to remove it at a later date.    Finally, the patient was told if the stone was very impacted, that we would place a stent and return at a later date to treat the stone.      Plan  The patient was scheduled and consented for \"right ureteroscopy with holmium laser lithotripsy and stent placement\" in the OR (LMA general anesthesia).  "

## 2020-08-31 NOTE — H&P
[facility]    History and Physical  Hospitalist       Date of Admission:  8/31/2020    Assessment & Plan   Hi Kinney is a 25 year old male who presents with flank pain.     Active Problems:    Ureterolithiasis    Assessment: acute, recurrent.  Right-sided.  The patient has a history of kidney stones in the past.    Plan: Observation status, IV fluids, IV ketorolac, tamsulosin, IV morphine as needed.  Consult urology for stone retrieval.    DVT Prophylaxis: Low Risk/Ambulatory with no VTE prophylaxis indicated  Code Status: Full Code    Hasmukh Baker    Primary Care Physician   Vero García    Chief Complaint   Right flank pain    History is obtained from the patient and chart review.    History of Present Illness   Hi Kinney is a 25 year old male who presents with acute onset of right flank pain last night at 2030.  He was sitting around a campfire when the symptoms started.  He has a history of recurrent ureteral lithiasis.  He has had 3 episodes in the past.  His pain was severe enough that he was vomiting.  No fevers or chills.  No hematuria.    He presented to the emergency department and was hemodynamically stable.  A CT of the abdomen and pelvis shows a 10 x 10 x 8 mm obstructing mid ureteral stone with severe hydronephrosis.    Past Medical History    I have reviewed this patient's medical history and updated it with pertinent information if needed.   Past Medical History:   Diagnosis Date     Calculus of kidney 4/27/2019       Past Surgical History   I have reviewed this patient's surgical history and updated it with pertinent information if needed.  Past Surgical History:   Procedure Laterality Date     COMBINED CYSTOSCOPY, URETEROSCOPY, LASER HOLMIUM LITHOTRIPSY URETER(S) Left 3/17/2019    Procedure: COMBINED CYSTOSCOPY, URETEROSCOPY, LASER HOLMIUM LITHOTRIPSY URETER(S)and stent placement;  Surgeon: Veto Peralta MD;  Location:  OR     COMBINED CYSTOSCOPY,  URETEROSCOPY, LASER HOLMIUM LITHOTRIPSY URETER(S) Left 11/17/2019    Procedure: COMBINED CYSTOSCOPY, URETEROSCOPY, LASER HOLMIUM LITHOTRIPSY URETER(S);  Surgeon: Veto Peralta MD;  Location:  OR     ESOPHAGOSCOPY, GASTROSCOPY, DUODENOSCOPY (EGD), COMBINED N/A 11/22/2019    Procedure: ESOPHAGOGASTRODUODENOSCOPY, WITH BIOPSY;  Surgeon: Arnie García MD;  Location:  OR     GRAFT SKIN FULL THICKNESS FROM EXTREMITY Right     right calf     TONSILLECTOMY      No Comments Provided       Prior to Admission Medications   Prior to Admission Medications   Prescriptions Last Dose Informant Patient Reported? Taking?   hydrOXYzine (ATARAX) 25 MG tablet Unknown at Unknown time Self No No   Sig: Take 1 tablet (25 mg) by mouth 3 times daily as needed for itching   omeprazole (PRILOSEC) 40 MG DR capsule Unknown at Unknown time  No No   Sig: Take 1 capsule (40 mg) by mouth daily   traZODone (DESYREL) 50 MG tablet Unknown at Unknown time  No No   Sig: Take 1 tablet (50 mg) by mouth nightly as needed for sleep      Facility-Administered Medications: None     Allergies   Allergies   Allergen Reactions     Cefprozil      Other reaction(s): *Unknown - Childhood Rxn     Augmentin Rash     Childhood reaction       Social History   I have reviewed this patient's social history and updated it with pertinent information if needed. Hi Kinney  reports that he has never smoked. He has never used smokeless tobacco. He reports current alcohol use. He reports that he does not use drugs.    Family History   I have reviewed this patient's family history and updated it with pertinent information if needed.   Both parents have had kidney stones.    Review of Systems     REVIEW OF SYSTEMS:    Constitutional: normal energy and appetite, no recent sick contacts  Eyes: no changes in vision  Ears, nose, mouth, throat, and face: no mouth sores, dysphagia, or odynophagia  Respiratory: no shortness of breath, cough, or wheezing. No aspiration  symptoms.   Cardiovascular: no chest pain, palpitations, orthopnea, increased lower extremity edema, or syncope.   Gastrointestinal: no constipation, diarrhea, nausea, vomiting or abdominal pain.  Genitourinary: complains of dysuria. No hematuria, urgency or frequency.   Hematologic/lymphatic: no unintentional weight loss or night sweats.  Musculoskeletal: no pain to extremities or falls.   Neurological: no new weakness, tingling, numbness.   Psychiatric: no hallucinations or delusions.  Endocrine:  not a known diabetic.    Physical Exam   Temp: 96.7  F (35.9  C) Temp src: Tympanic BP: 118/68 Pulse: 82   Resp: 18 SpO2: 97 % O2 Device: None (Room air)    Vital Signs with Ranges  Temp:  [95.5  F (35.3  C)-96.9  F (36.1  C)] 96.7  F (35.9  C)  Pulse:  [] 82  Resp:  [18-20] 18  BP: ()/(54-96) 118/68  SpO2:  [94 %-99 %] 97 %  211 lbs 0 oz    GENERAL: Comfortable, talkative, in no apparent distress.  HEENT: Anicteric, non-injected sclera, mouth moist.   NECK: No JVD.  CARDIOVASCULAR: regular rate and rhythm, no murmur. No lower extremity edema   RESPIRATORY: Clear to auscultation bilaterally, no wheezes, no crackles.  GI: Non-distended, normal bowel sounds, soft, non-tender.  SKIN: No rashes, sores.   NEUROLOGY: Alert and oriented x3, follows commands, speech and language normal.       Data   Data reviewed today:  I personally reviewed the abdominal CT image(s) showing hydro with [id number] mm stone.  Recent Labs   Lab 08/31/20  0628   WBC 15.9*   HGB 16.7   MCV 86         POTASSIUM 4.3   CHLORIDE 101   CO2 24   BUN 17   CR 1.27   ANIONGAP 11   ALICE 9.4   GLC 92       Recent Results (from the past 24 hour(s))   CT Abdomen Pelvis w/o Contrast    Narrative    Addendum created by Radha Villafana MD on 8/31/2020 6:10:06 AM CDT:  CORRECTION:      Impression    IMPRESSION:   1. LARGE OBSTRUCTIVE MID-DISTAL JUNCTION RIGHT URETERIC CALCULUS MEASURING 10 X   10 X 8 MM.   2. Other nonemergent/incidental findings  as described.   _______________________________________________________        Initial report created on 8/31/2020 6:02:13 AM CDT:    PROCEDURE INFORMATION:   Exam: CT Abdomen And Pelvis Without Contrast   Exam date and time: 8/31/2020 5:05 AM   Age: 25 years old   Clinical indication: Other: Right flank pain; Prior surgery; Surgery date: 6+   months; Surgery type: Combined cystoscopy, ureteroscopy, laser holmium   lithotripsy ureter (left); Additional info: Flank pain, recurrent stone disease   suspected - right     TECHNIQUE:   Imaging protocol: Computed tomography of the abdomen and pelvis without   contrast.   Radiation optimization: All CT scans at this facility use at least one of these   dose optimization techniques: automated exposure control; mA and/or kV   adjustment per patient size (includes targeted exams where dose is matched to   clinical indication); or iterative reconstruction.     COMPARISON:   No relevant prior studies available.     FINDINGS:    PANCREATICOHEPATOBILIARY: The liver is enlarged without a focal intrahepatic   mass or abnormality. No significant intra-or extrahepatic ductal dilation.   Gallbladder, pancreas and spleen are unremarkable.    .    GENITOURINARY: No adrenal mass. 10 x 10 x 8 mm RIGHT ureteric calculus at the   junction of the mid and distal ureter with moderately severe RIGHT sided   hydronephrosis and hydroureter. Empty urinary bladder. No free fluid in the   pelvis.    .    GASTROINTESTINAL: A few colonic diverticula. Colon contains moderate amount of   fecal matter and air. No free intraperitoneal air or fluid collection. A normal   APPENDIX is visualized.    .    OTHER STRUCTURES: Aorta appears unremarkable without evidence of aortic   aneurysm. No bulky lymph node enlargement. Mild chronic compression deformity   of superior endplate of L1 and prominent Schmorl's node along the inferior   endplate of L4.     IMPRESSION:   1. LARGE OBSTRUCTIVE MID RIGHT URETERIC  CALCULUS MEASURING 10 X 10 X 8 MM.   2. Other nonemergent/incidental findings as described.     COMMENTS:   Suboptimal evaluation of bowel loops and abdominal organs due to lack of   intravenous and oral contrast.     THIS DOCUMENT HAS BEEN ELECTRONICALLY SIGNED BY ELIZABETH TORIBIO MD

## 2020-08-31 NOTE — PROGRESS NOTES
[facility] DISCHARGE NOTE    Patient discharged to home at 6:50 PM via ambulation. Accompanied staff. Discharge instructions reviewed with patient, opportunity offered to ask questions. Prescriptions sent to patients preferred pharmacy. All belongings sent with patient.    Crys Jeffrey RN

## 2020-08-31 NOTE — PLAN OF CARE
"/56   Pulse 96   Temp 98.5  F (36.9  C) (Tympanic)   Resp 20   Ht 1.727 m (5' 8\")   Wt 95.7 kg (211 lb)   SpO2 96%   BMI 32.08 kg/m      Patient A&O x4. Returned from procedure approx 1430 report received from QUIQUE Wolfe, was oriented to room. Patient urinated after procedure, c/o burning with urination. Denies pain at this time. Tolerating regular diet at this time.   "

## 2020-09-01 NOTE — ANESTHESIA POSTPROCEDURE EVALUATION
Patient: Hi Kinney    Procedure(s):  Right ureteroscopy with holmium laser lithotripsy and stent placement    Diagnosis:Right ureteral stone [N20.1]  Diagnosis Additional Information: No value filed.    Anesthesia Type:  General    Note:  Anesthesia Post Evaluation    Patient participation: Able to fully participate in evaluation  Level of consciousness: awake and alert  Pain management: adequate  Airway patency: patent  Cardiovascular status: acceptable  Respiratory status: acceptable  Hydration status: acceptable  PONV: none     Anesthetic complications: None          Last vitals:  Vitals:    08/31/20 1415 08/31/20 1420 08/31/20 1528   BP:   112/56   Pulse:   94   Resp:   16   Temp:      SpO2: 94% 96% 98%         Electronically Signed By: Tk Bray CRNA, APRN CRNA  September 1, 2020  2:17 PM

## 2020-09-02 ENCOUNTER — MYC MEDICAL ADVICE (OUTPATIENT)
Dept: UROLOGY | Facility: OTHER | Age: 25
End: 2020-09-02

## 2020-09-10 ENCOUNTER — OFFICE VISIT (OUTPATIENT)
Dept: UROLOGY | Facility: OTHER | Age: 25
End: 2020-09-10
Attending: UROLOGY
Payer: COMMERCIAL

## 2020-09-10 VITALS — BODY MASS INDEX: 31.32 KG/M2 | RESPIRATION RATE: 12 BRPM | HEART RATE: 104 BPM | WEIGHT: 206 LBS

## 2020-09-10 DIAGNOSIS — N20.0 KIDNEY STONES: Primary | ICD-10-CM

## 2020-09-10 DIAGNOSIS — R82.991 HYPOCITRATURIA: ICD-10-CM

## 2020-09-10 PROCEDURE — 99212 OFFICE O/P EST SF 10 MIN: CPT | Mod: 25 | Performed by: UROLOGY

## 2020-09-10 PROCEDURE — 52310 CYSTOSCOPY AND TREATMENT: CPT | Performed by: UROLOGY

## 2020-09-10 RX ORDER — POTASSIUM CITRATE 15 MEQ/1
1 TABLET, EXTENDED RELEASE ORAL 2 TIMES DAILY WITH MEALS
Qty: 180 TABLET | Refills: 3 | Status: SHIPPED | OUTPATIENT
Start: 2020-09-10 | End: 2020-11-23

## 2020-09-10 RX ORDER — POTASSIUM CITRATE 10 MEQ/1
20 TABLET, EXTENDED RELEASE ORAL 2 TIMES DAILY WITH MEALS
Qty: 360 TABLET | Refills: 3 | Status: SHIPPED | OUTPATIENT
Start: 2020-09-10 | End: 2020-11-20 | Stop reason: DRUGHIGH

## 2020-09-10 ASSESSMENT — PAIN SCALES - GENERAL: PAINLEVEL: NO PAIN (0)

## 2020-09-10 NOTE — NURSING NOTE
Patient positioned in supine position, perineum area prepped with chlorhexidene Gluconate and patient draped per sterile technique. Per verbal order read back by Veto Peralta MD, Urojet 10mL 2% lidocaine jelly to be instilled into urethra.  Urojet- 10ml 2% Lidocaine jelly instilled into the urethra.    Urojet 2%  Lot#: [id number]  Expiration date: 4/22  : [employer]  NDC: [address]-8    [place] Protocol    A. Pre-procedure verification complete Yes  1-relevant information / documentation available, reviewed and properly matched to the patient; 2-consent accurate and complete, 3-equipment and supplies available    B. Site marking complete N/A  Site marked if not in continuous attendance with patient    C. TIME OUT completed Yes  Time Out was conducted just prior to starting procedure to verify the eight required elements: 1-patient identity, 2-consent accurate and complete, 3-position, 4-correct side/site marked (if applicable), 5-procedure, 6-relevant images / results properly labeled and displayed (if applicable), 7-antibiotics / irrigation fluids (if applicable), 8-safety precautions.    After procedure perineum area rinsed. Discharge instructions reviewed with patient. Patient verbalized understanding of discharge instructions and discharged ambulatory.  Yesi Martínez RN..................9/10/2020  8:30 AM

## 2020-09-10 NOTE — PROGRESS NOTES
Preoperative diagnosis  Nephrolithiasis    Postoperative diagnosis  Nephrolithiasis    Procedure  Flexible cystourethroscopy with stent removal    Surgeon  Veto Peralta MD    Anesthesia  2% lidocaine jelly intraurethrally    Complications  None    Indications  25 year old male who is status post ureteroscopy with holmium laser lithotripsy who presents for stent removal.    Procedure  The patient was given one dose of antibiotics. The patient was placed in supine position and was prepped and draped in sterile fashion.  2% lidocaine jelly was bluntly injected per urethra without difficulty. I passed the 14 [place] flexible cystoscope through the urethra and into the bladder.  With the aid of a stent grasper I grasped and removed the stent in its entirety.  The patient tolerated the procedure well.    Plan  Restart potassium citrate        I spent 10 minutes on this patient's visit (exclusive of separately billed services/procedures) and over half of this time was spent in face-to-face counseling regarding stone prevention:  Prevention strategies with fluids and diet, rationale for a metabolic work up, prognosis and importance of compliance.

## 2020-09-10 NOTE — NURSING NOTE
Bactrim DS one tablet by mouth one time now ordered by Veto Peralta MD.  Medication administered per verbal order   Lot # [id number]  Exp. 06/2021  NDC 6583-0822-13  Patient tolerated well.  Yesi Martínez RN..................9/10/2020  8:06 AM

## 2020-09-10 NOTE — PATIENT INSTRUCTIONS
Home Care after Stent Pull  Follow these guidelines for your care after your procedure.    Activity  No limitations    Bathing or showering  No limitations    Symptoms  You may notice some burning with urination but this usually resolves after 1-2 days.  You may also notice small amounts of blood in your urine.  Please increase water intake for the next few days to help with these symptoms.    Contacts  General Questions: [phone]  Appointments:  [phone]  Emergencies:  [phone]    When to call the clinic  If you develop any of the following symptoms please call the clinic immediately.  If the clinic is closed please be seen at an urgent care clinic or the Emergency Department.  - Burning with urination that worsens after 2 days  - Unable to urinate  - Fevers of greater than 101 degrees F  - Flank pain that is not responding to pain medication    Follow up  Please follow up in clinic as discussed.      Please follow the below generic recommendations to help prevent stones.    If you are interested in undergoing a work up (including blood and urine tests) to determine your patient specific factors for why you form stones and ways to specifically prevent stones in the future, ask Dr Peralta if he hasn't already discussed this with you.      Fluids (Increase)  Please increase your fluid intake   Recommend about ten 10-ounce glasses of fluid per day (avoid dark breanna).  Please try and keep your urine clear or pale yellow.    If it is dark yellow or cloudy it is concentrated and you need to drink more fluid.  Try drinking a tall glass of water prior to every snack/meal.    Citrate (Increase)  Citrate prevents stone formation and is naturally found in urine.    It can be increased by adding concentrated lemon juice (4 ounces daily) and/or drinking diluted orange juice (50/50 with water).    Both MinuteMaid Light and Crystal Light also contain citrate and can be helpful for stone prevention.    If you plan to drink  sodas, I would recommend Diet/Sunkist and/or Diet/7UP as they contain the most citrate (which is good) compared to the breanna such as Coke/Pepsi.      Salt (Decrease)  Try to limit salt intake.  Total daily sodium intake should be less than 1500mg.  If you use salt with cooking don't add any additional salt at the table.    Protein  Limit protein intake to small to moderate portions.  For instance, a steak should be no larger than about the size of a deck of cards.  High protein intake leads to stone formation.

## 2020-10-14 ENCOUNTER — RESULTS ONLY (OUTPATIENT)
Dept: LAB | Age: 25
End: 2020-10-14

## 2020-10-14 LAB
SARS-COV-2 RNA SPEC QL NAA+PROBE: NORMAL
SPECIMEN SOURCE: NORMAL

## 2020-10-15 LAB
LABORATORY COMMENT REPORT: NORMAL
SARS-COV-2 RNA SPEC QL NAA+PROBE: NEGATIVE
SPECIMEN SOURCE: NORMAL

## 2020-11-12 ENCOUNTER — TELEPHONE (OUTPATIENT)
Dept: SURGERY | Facility: OTHER | Age: 25
End: 2020-11-12

## 2020-11-18 ENCOUNTER — TELEPHONE (OUTPATIENT)
Dept: UROLOGY | Facility: OTHER | Age: 25
End: 2020-11-18

## 2020-11-18 DIAGNOSIS — R82.991 HYPOCITRATURIA: Primary | ICD-10-CM

## 2020-11-18 NOTE — TELEPHONE ENCOUNTER
Patient called to schedule surgery with Arnie García MD.  Surgery scheduled for Laparoscopic cholecystectomy on 11/30/2020.   PAC appointment scheduled on 11/20/20 at 10:00AM.  Covid appointment scheduled on 11/26/20 at 10:20AM.  Post op appointment scheduled on 12/15/20 at 9:30AM.  Reviewed surgery folder with patient.  Patient will  folder at unit 4 window.  All patient's questions were answered.

## 2020-11-18 NOTE — TELEPHONE ENCOUNTER
Patient called and states he is taking potassium citrate and was to follow up with labs. He wants to be seen on 11/23/20 since he is off. Does he need a lab appointment also for potassium?     Plan  Restart potassium citrate    Rebeca Hawkins on 11/18/2020 at 3:49 PM

## 2020-11-19 NOTE — TELEPHONE ENCOUNTER
Left message to call back to schedule lab prior to appointment.    Rebeca Hawkins on 11/19/2020 at 9:51 AM

## 2020-11-20 ENCOUNTER — RESULTS ONLY (OUTPATIENT)
Dept: LAB | Age: 25
End: 2020-11-20

## 2020-11-20 DIAGNOSIS — R82.991 HYPOCITRATURIA: ICD-10-CM

## 2020-11-20 LAB
ANION GAP SERPL CALCULATED.3IONS-SCNC: 7 MMOL/L (ref 3–14)
BUN SERPL-MCNC: 14 MG/DL (ref 7–25)
CALCIUM SERPL-MCNC: 9.7 MG/DL (ref 8.6–10.3)
CHLORIDE SERPL-SCNC: 104 MMOL/L (ref 98–107)
CO2 SERPL-SCNC: 28 MMOL/L (ref 21–31)
CREAT SERPL-MCNC: 1.03 MG/DL (ref 0.7–1.3)
GFR SERPL CREATININE-BSD FRML MDRD: 88 ML/MIN/{1.73_M2}
GLUCOSE SERPL-MCNC: 95 MG/DL (ref 70–105)
POTASSIUM SERPL-SCNC: 3.9 MMOL/L (ref 3.5–5.1)
SARS-COV-2 RNA SPEC QL NAA+PROBE: NORMAL
SODIUM SERPL-SCNC: 139 MMOL/L (ref 134–144)
SPECIMEN SOURCE: NORMAL

## 2020-11-20 PROCEDURE — 80048 BASIC METABOLIC PNL TOTAL CA: CPT | Mod: ZL | Performed by: UROLOGY

## 2020-11-20 PROCEDURE — 36415 COLL VENOUS BLD VENIPUNCTURE: CPT | Mod: ZL | Performed by: UROLOGY

## 2020-11-23 ENCOUNTER — OFFICE VISIT (OUTPATIENT)
Dept: UROLOGY | Facility: OTHER | Age: 25
End: 2020-11-23
Attending: UROLOGY
Payer: COMMERCIAL

## 2020-11-23 VITALS
OXYGEN SATURATION: 98 % | WEIGHT: 206 LBS | RESPIRATION RATE: 16 BRPM | BODY MASS INDEX: 31.32 KG/M2 | SYSTOLIC BLOOD PRESSURE: 112 MMHG | HEART RATE: 87 BPM | DIASTOLIC BLOOD PRESSURE: 60 MMHG

## 2020-11-23 DIAGNOSIS — R82.991 HYPOCITRATURIA: ICD-10-CM

## 2020-11-23 DIAGNOSIS — N20.0 KIDNEY STONES: ICD-10-CM

## 2020-11-23 PROCEDURE — 99213 OFFICE O/P EST LOW 20 MIN: CPT | Performed by: UROLOGY

## 2020-11-23 RX ORDER — POTASSIUM CITRATE 15 MEQ/1
2 TABLET, EXTENDED RELEASE ORAL 2 TIMES DAILY WITH MEALS
Qty: 360 TABLET | Refills: 3 | Status: SHIPPED | OUTPATIENT
Start: 2020-11-23 | End: 2021-06-14

## 2020-11-23 ASSESSMENT — PAIN SCALES - GENERAL: PAINLEVEL: NO PAIN (0)

## 2020-11-23 NOTE — H&P (VIEW-ONLY)
Type of Visit  Established    Chief Complaint  History of kidney stones  Hypocitraturia    HPI  Mr. Kinney is a 25 year old male with history of kidney stones for the last 15 years.  The patient underwent a metabolic work-up revealing hypocitraturia.  He was experiencing recurrent stones at a frequent rate and I recommended he start potassium citrate.  He has been dealing with GI issues and unfortunately was unable to tolerate the medication in the past.  Due to a recent recurrent stone episode he again tried potassium citrate.  He started the medication approximately 2 months ago.  He reports tolerating the medication well.  He takes 2 tablets twice daily and tolerates this regimen well.      Review of Systems  I reviewed the ROS with the patient.    Nursing Notes:   Paula Sewell LPN  11/23/2020  3:26 PM  Signed  Chief Complaint   Patient presents with     Follow Up     medication    Patient presents to the clinic today for a follow up for medication,     Review of Systems:    Weight loss:    No     Recent fever/chills:  No   Night sweats:   No  Current skin rash:  No   Recent hair loss:  No  Heat intolerance:  No   Cold intolerance:  No  Chest pain:   No   Palpitations:   No  Shortness of breath:  No   Wheezing:   No  Constipation:    No   Diarrhea:   No   Nausea:   No   Vomiting:   No   Kidney/side pain:  No   Back pain:   Yes  Frequent headaches:  No   Dizziness:     No  Leg swelling:   No   Calf pain:    No          Medication Reconciliation: completed   Paula Sewell LPN  11/23/2020 3:16 PM         Physical Exam  /60 (BP Location: Right arm, Patient Position: Sitting, Cuff Size: Adult Large)   Pulse 87   Resp 16   Wt 93.4 kg (206 lb)   SpO2 98%   BMI 31.32 kg/m    Constitutional: NAD, WDWN.  Cardiovascular: Regular rate.  Pulmonary/Chest: Respirations are even and non-labored bilaterally.  Abdominal: Soft. No distension, tenderness, masses or guarding.  Back: - left CVA tenderness,  - right CVA  tenderness.   Extremities: ADELA x 4, Warm. No clubbing.  No cyanosis.    Skin: Pink, warm and dry.  No rashes noted.    Labs  Results for TAMMY WALKER (MRN [id number]) as of 11/23/2020 15:27   11/20/2020 12:37   Sodium 139   Potassium 3.9   Chloride 104   Carbon Dioxide 28   Urea Nitrogen 14   Creatinine 1.03   GFR Estimate 88   GFR Estimate If Black >90   Calcium 9.7   Anion Gap 7   Glucose 95     Results for TAMMY WALKER (MRN [id number]) as of 5/15/2019 09:47   3/25/2019 08:46   Magnesium 1.9   Phosphorus 1.8 (L)   Parathyroid Hormone Intact 16     Stone Analysis  3/17/2019  20% calcium oxalate  80% calcium phosphate     [facility]     4/28/2019 & 4/29/2019  Volume (L)  2.1-4.5   SS CaOx  2.27-5.4 (6-10)  Urine Calcium  203-213 (male<250)  Urine Oxalate  32-34  (20-40)  Urine Citrate  238-362 (male>450)  SS CaP  0.8-1.4  (0.5-2)  24 hr Urine pH  6.4-6.8  (5.8-6.2)  SS Uric Acid  0.07-0.31 (0-1)  Urine uric acid  0.76-0.777 (<0.8)    (collected while on treatment:  None)    Na: 107-206 and Cl:     Radiographic Studies  I personally reviewed and interpreted the images and report.  CT Stone  4/26/2019  IMPRESSION:   1. No acute osseous pelvic CT findings.   2. Stable mild fullness of the right renal collecting system. Is there chronic in nature.   3. 1 mm nonobstructing left renal calculus.    Assessment  Mr. Walker is a 25 year old male with history of kidney stones with hypocitraturia who recently started medical management with potassium citrate who follows up with a BMP.  He is tolerating this regimen well and is able to continue it on a regular basis.    Clinically it appears the patient may have distal renal tubular acidosis.  I described the pathophysiology of this disease state and explained how it leads to recurrent stones.    Plan  Continue KCitrate 15 mEq - take 2 tablets (30 mEq) BID  Follow up in 6 months with a BMP then once annually with a BMP

## 2020-11-23 NOTE — PROGRESS NOTES
Type of Visit  Established    Chief Complaint  History of kidney stones  Hypocitraturia    HPI  Mr. Kinney is a 25 year old male with history of kidney stones for the last 15 years.  The patient underwent a metabolic work-up revealing hypocitraturia.  He was experiencing recurrent stones at a frequent rate and I recommended he start potassium citrate.  He has been dealing with GI issues and unfortunately was unable to tolerate the medication in the past.  Due to a recent recurrent stone episode he again tried potassium citrate.  He started the medication approximately 2 months ago.  He reports tolerating the medication well.  He takes 2 tablets twice daily and tolerates this regimen well.      Review of Systems  I reviewed the ROS with the patient.    Nursing Notes:   Paula Sewell LPN  11/23/2020  3:26 PM  Signed  Chief Complaint   Patient presents with     Follow Up     medication    Patient presents to the clinic today for a follow up for medication,     Review of Systems:    Weight loss:    No     Recent fever/chills:  No   Night sweats:   No  Current skin rash:  No   Recent hair loss:  No  Heat intolerance:  No   Cold intolerance:  No  Chest pain:   No   Palpitations:   No  Shortness of breath:  No   Wheezing:   No  Constipation:    No   Diarrhea:   No   Nausea:   No   Vomiting:   No   Kidney/side pain:  No   Back pain:   Yes  Frequent headaches:  No   Dizziness:     No  Leg swelling:   No   Calf pain:    No          Medication Reconciliation: completed   Paula Sewell LPN  11/23/2020 3:16 PM         Physical Exam  /60 (BP Location: Right arm, Patient Position: Sitting, Cuff Size: Adult Large)   Pulse 87   Resp 16   Wt 93.4 kg (206 lb)   SpO2 98%   BMI 31.32 kg/m    Constitutional: NAD, WDWN.  Cardiovascular: Regular rate.  Pulmonary/Chest: Respirations are even and non-labored bilaterally.  Abdominal: Soft. No distension, tenderness, masses or guarding.  Back: - left CVA tenderness,  - right CVA  tenderness.   Extremities: ADELA x 4, Warm. No clubbing.  No cyanosis.    Skin: Pink, warm and dry.  No rashes noted.    Labs  Results for TAMMY WALKER (MRN [id number]) as of 11/23/2020 15:27   11/20/2020 12:37   Sodium 139   Potassium 3.9   Chloride 104   Carbon Dioxide 28   Urea Nitrogen 14   Creatinine 1.03   GFR Estimate 88   GFR Estimate If Black >90   Calcium 9.7   Anion Gap 7   Glucose 95     Results for TAMMY WALKER (MRN [id number]) as of 5/15/2019 09:47   3/25/2019 08:46   Magnesium 1.9   Phosphorus 1.8 (L)   Parathyroid Hormone Intact 16     Stone Analysis  3/17/2019  20% calcium oxalate  80% calcium phosphate     [facility]     4/28/2019 & 4/29/2019  Volume (L)  2.1-4.5   SS CaOx  2.27-5.4 (6-10)  Urine Calcium  203-213 (male<250)  Urine Oxalate  32-34  (20-40)  Urine Citrate  238-362 (male>450)  SS CaP  0.8-1.4  (0.5-2)  24 hr Urine pH  6.4-6.8  (5.8-6.2)  SS Uric Acid  0.07-0.31 (0-1)  Urine uric acid  0.76-0.777 (<0.8)    (collected while on treatment:  None)    Na: 107-206 and Cl:     Radiographic Studies  I personally reviewed and interpreted the images and report.  CT Stone  4/26/2019  IMPRESSION:   1. No acute osseous pelvic CT findings.   2. Stable mild fullness of the right renal collecting system. Is there chronic in nature.   3. 1 mm nonobstructing left renal calculus.    Assessment  Mr. Walker is a 25 year old male with history of kidney stones with hypocitraturia who recently started medical management with potassium citrate who follows up with a BMP.  He is tolerating this regimen well and is able to continue it on a regular basis.    Clinically it appears the patient may have distal renal tubular acidosis.  I described the pathophysiology of this disease state and explained how it leads to recurrent stones.    Plan  Continue KCitrate 15 mEq - take 2 tablets (30 mEq) BID  Follow up in 6 months with a BMP then once annually with a BMP

## 2020-11-23 NOTE — NURSING NOTE
Chief Complaint   Patient presents with     Follow Up     medication    Patient presents to the clinic today for a follow up for medication,     Review of Systems:    Weight loss:    No     Recent fever/chills:  No   Night sweats:   No  Current skin rash:  No   Recent hair loss:  No  Heat intolerance:  No   Cold intolerance:  No  Chest pain:   No   Palpitations:   No  Shortness of breath:  No   Wheezing:   No  Constipation:    No   Diarrhea:   No   Nausea:   No   Vomiting:   No   Kidney/side pain:  No   Back pain:   Yes  Frequent headaches:  No   Dizziness:     No  Leg swelling:   No   Calf pain:    No          Medication Reconciliation: completed   Paula Sewell LPN  11/23/2020 3:16 PM

## 2020-11-24 ENCOUNTER — THERAPY VISIT (OUTPATIENT)
Dept: CHIROPRACTIC MEDICINE | Facility: OTHER | Age: 25
End: 2020-11-24
Attending: CHIROPRACTOR
Payer: COMMERCIAL

## 2020-11-24 DIAGNOSIS — M54.50 ACUTE LEFT-SIDED LOW BACK PAIN WITHOUT SCIATICA: ICD-10-CM

## 2020-11-24 DIAGNOSIS — M99.04 SEGMENTAL AND SOMATIC DYSFUNCTION OF SACRAL REGION: Primary | ICD-10-CM

## 2020-11-24 PROCEDURE — 99212 OFFICE O/P EST SF 10 MIN: CPT | Mod: 25 | Performed by: CHIROPRACTOR

## 2020-11-24 PROCEDURE — 98940 CHIROPRACT MANJ 1-2 REGIONS: CPT | Mod: AT | Performed by: CHIROPRACTOR

## 2020-11-24 NOTE — PROGRESS NOTES
Visit #:  1  New Episode 11/24/2020    Subjective:  Hi Kinney is a 25 year old male who is seen for:        Segmental and somatic dysfunction of sacral region  Acute left-sided low back pain without sciatica.     Since last visit on 8/4/2020,  Hi Kinney reports: During first week of deer season patient described helping to move several deer stands and believes this caused left-sided low back pain to worsen.  No specific traumatic causative factors or other overuse type injuries noted tenderness and patient history.  Patient was immediately treated by family relative who happens to be a chiropractor in [facility].  This seemed to provide the patient with quite a bit of relief as symptoms did quite a bit better after a couple of visits until couple of days ago when symptoms began to worsen to current levels.  Initially patient believed he was going to suffer from another kidney stone attack.  Patient does have history of ongoing kidney stone complaints.  Patient stated that the difference between current back pain and kidney stone pain is that back pain seem to worsen with activity.  Because of this and the fact that symptoms seem to do better after undergoing chiropractic adjustments with family relative patient presents to our office for evaluation and treatment.    No saddle paresthesia, or loss of bowel/blader. Next week patient will be under going gall bladder removal.    No current neck pain, but has been noticing some headaches, these do not appear to be worsening or out of the ordinary for the patient.    Area of chief complaint:  Lumbar :  Symptoms are graded at 8/10. The quality is described as throbbing.       Objective:  The following was observed:  Oswestry (ESTHER) Questionnaire    OSWESTRY DISABILITY INDEX 11/24/2020   Count 9   Sum 7   Oswestry Score (%) 15.56   Some recent data might be hidden      Thoracic/lumbar AROM: left lateral flexion restriction approximately 5-10 degrees.  All  other ranges of motion are unremarkable    SLR:- right, - left  [place]'s: - right, - left    P: palpatory tenderness left PSIS:    A: static palpation demonstrates intersegmental asymmetry , pelvis  R: motion palpation notes restricted motion, Sacrum   T: muscle spasm at level(s): left quadratus lumborum:      Segmental spinal dysfunction/restrictions found at:  :  Sacrum P-L listing.      Assessment: Acute onset low back pain.  Evidence present of segmental/somatic dysfunction of the left innominate consistent with patient's symptoms.  Plan of care to include up to 6 chiropractic visits over the next 4 weeks for current flareup.  Patient was instructed after today's visit to follow-up early next week.  However if symptoms fail to improve patient is contact Dr. Agata CRUM to try and obtain an appointment with the [facility] care clinic.  Diagnoses:      1. Segmental and somatic dysfunction of sacral region    2. Acute left-sided low back pain without sciatica        Patient's condition:  Patient had restrictions pre-manipulation and Patient symptoms are worsed due to deer hunting related activities.    Treatment effectiveness:  Post manipulation there is better intersegmental movement and Patient claims to feel looser post manipulation      Procedures:  [id number] Established patient, straight forward complexity, 10 minutes    CMT:  74925 Chiropractic manipulative treatment 1-2 regions performed   Pelvis: Diversified, Sacrum , Side posture    Modalities:  None performed this visit    Therapeutic procedures:  None    Response to Treatment  Reduction in symptoms as reported by patient    Prognosis: Excellent    Progress towards Goals: Reduce back pain by 75%  Improve spinal AROM.  Reduce index score by 20%    Recommendations:    Instructions:ice 20 minutes every other hour as needed and heat 15 minutes every other hour as needed   If  symptoms fail to improve during holiday weekend patient was instructed to follow up with the  [facility] care clinic and to have the staff contact Dr. Agata CURRY for follow up evaluation and treatment.  Follow-up:  Return to care in 1 week.

## 2020-11-25 ENCOUNTER — ALLIED HEALTH/NURSE VISIT (OUTPATIENT)
Dept: FAMILY MEDICINE | Facility: OTHER | Age: 25
End: 2020-11-25
Attending: NURSE PRACTITIONER
Payer: COMMERCIAL

## 2020-11-25 DIAGNOSIS — Z20.822 COVID-19 RULED OUT: Primary | ICD-10-CM

## 2020-11-25 PROCEDURE — U0003 INFECTIOUS AGENT DETECTION BY NUCLEIC ACID (DNA OR RNA); SEVERE ACUTE RESPIRATORY SYNDROME CORONAVIRUS 2 (SARS-COV-2) (CORONAVIRUS DISEASE [COVID-19]), AMPLIFIED PROBE TECHNIQUE, MAKING USE OF HIGH THROUGHPUT TECHNOLOGIES AS DESCRIBED BY CMS-2020-01-R: HCPCS | Mod: ZL | Performed by: NURSE PRACTITIONER

## 2020-11-25 PROCEDURE — 99207 PR NO CHARGE NURSE ONLY: CPT

## 2020-11-25 PROCEDURE — C9803 HOPD COVID-19 SPEC COLLECT: HCPCS

## 2020-11-25 NOTE — NURSING NOTE
Patient swabbed for COVID-19 testing for procedure with SPO 11/30/20.  Shu Mercado LPN on 11/25/2020 at 4:52 PM

## 2020-11-26 LAB
SARS-COV-2 RNA SPEC QL NAA+PROBE: NORMAL
SPECIMEN SOURCE: NORMAL

## 2020-11-27 ENCOUNTER — ANESTHESIA EVENT (OUTPATIENT)
Dept: SURGERY | Facility: OTHER | Age: 25
End: 2020-11-27
Payer: COMMERCIAL

## 2020-11-30 ENCOUNTER — HOSPITAL ENCOUNTER (OUTPATIENT)
Facility: OTHER | Age: 25
Discharge: HOME | End: 2020-11-30
Attending: SURGERY | Admitting: SURGERY
Payer: COMMERCIAL

## 2020-11-30 ENCOUNTER — ANESTHESIA (OUTPATIENT)
Dept: SURGERY | Facility: OTHER | Age: 25
End: 2020-11-30
Payer: COMMERCIAL

## 2020-11-30 VITALS
BODY MASS INDEX: 33.11 KG/M2 | OXYGEN SATURATION: 96 % | TEMPERATURE: 97.4 F | WEIGHT: 206 LBS | DIASTOLIC BLOOD PRESSURE: 62 MMHG | RESPIRATION RATE: 17 BRPM | HEIGHT: 66 IN | SYSTOLIC BLOOD PRESSURE: 123 MMHG | HEART RATE: 100 BPM

## 2020-11-30 DIAGNOSIS — K81.1 CHRONIC CHOLECYSTITIS: Primary | ICD-10-CM

## 2020-11-30 PROCEDURE — 258N000003 HC RX IP 258 OP 636: Performed by: NURSE ANESTHETIST, CERTIFIED REGISTERED

## 2020-11-30 PROCEDURE — 250N000011 HC RX IP 250 OP 636: Performed by: NURSE ANESTHETIST, CERTIFIED REGISTERED

## 2020-11-30 PROCEDURE — 250N000009 HC RX 250: Performed by: SURGERY

## 2020-11-30 PROCEDURE — 250N000009 HC RX 250: Performed by: NURSE ANESTHETIST, CERTIFIED REGISTERED

## 2020-11-30 PROCEDURE — 360N000021 HC SURGERY LEVEL 3 EA 15 ADDTL MIN: Performed by: SURGERY

## 2020-11-30 PROCEDURE — 250N000013 HC RX MED GY IP 250 OP 250 PS 637: Performed by: SURGERY

## 2020-11-30 PROCEDURE — 250N000011 HC RX IP 250 OP 636: Performed by: SURGERY

## 2020-11-30 PROCEDURE — 47562 LAPAROSCOPIC CHOLECYSTECTOMY: CPT | Performed by: SURGERY

## 2020-11-30 PROCEDURE — 370N000001 HC ANESTHESIA TECHNICAL FEE, 1ST 30 MIN: Performed by: SURGERY

## 2020-11-30 PROCEDURE — 258N000001 HC RX 258: Performed by: SURGERY

## 2020-11-30 PROCEDURE — 761N000007 HC RECOVERY PHASE 2 EACH 15 MINS: Performed by: SURGERY

## 2020-11-30 PROCEDURE — 250N000001 HC DESFLURANE, EA 15 MIN: Performed by: SURGERY

## 2020-11-30 PROCEDURE — 761N000003 HC RECOVERY PHASE 1 LEVEL 2 FIRST HR: Performed by: SURGERY

## 2020-11-30 PROCEDURE — 88304 TISSUE EXAM BY PATHOLOGIST: CPT

## 2020-11-30 PROCEDURE — 272N000001 HC OR GENERAL SUPPLY STERILE: Performed by: SURGERY

## 2020-11-30 PROCEDURE — 999N000136 HC STATISTIC PRE PROC ASSESS II: Performed by: SURGERY

## 2020-11-30 PROCEDURE — 47562 LAPAROSCOPIC CHOLECYSTECTOMY: CPT | Performed by: NURSE ANESTHETIST, CERTIFIED REGISTERED

## 2020-11-30 PROCEDURE — 360N000020 HC SURGERY LEVEL 3 1ST 30 MIN: Performed by: SURGERY

## 2020-11-30 PROCEDURE — 370N000002 HC ANESTHESIA TECHNICAL FEE, EACH ADDTL 15 MIN: Performed by: SURGERY

## 2020-11-30 RX ORDER — IBUPROFEN 600 MG/1
600 TABLET, FILM COATED ORAL EVERY 6 HOURS PRN
Qty: 30 TABLET | Refills: 0 | Status: SHIPPED | OUTPATIENT
Start: 2020-11-30 | End: 2021-04-15

## 2020-11-30 RX ORDER — NALOXONE HYDROCHLORIDE 0.4 MG/ML
0.4 INJECTION, SOLUTION INTRAMUSCULAR; INTRAVENOUS; SUBCUTANEOUS
Status: DISCONTINUED | OUTPATIENT
Start: 2020-11-30 | End: 2020-11-30 | Stop reason: HOSPADM

## 2020-11-30 RX ORDER — ACETAMINOPHEN 325 MG/1
650 TABLET ORAL EVERY 4 HOURS PRN
Qty: 100 TABLET | Refills: 0 | Status: SHIPPED | OUTPATIENT
Start: 2020-11-30 | End: 2021-04-15

## 2020-11-30 RX ORDER — ONDANSETRON 2 MG/ML
4 INJECTION INTRAMUSCULAR; INTRAVENOUS EVERY 30 MIN PRN
Status: DISCONTINUED | OUTPATIENT
Start: 2020-11-30 | End: 2020-11-30 | Stop reason: HOSPADM

## 2020-11-30 RX ORDER — OXYCODONE AND ACETAMINOPHEN 5; 325 MG/1; MG/1
2 TABLET ORAL
Status: COMPLETED | OUTPATIENT
Start: 2020-11-30 | End: 2020-11-30

## 2020-11-30 RX ORDER — PROPOFOL 10 MG/ML
INJECTION, EMULSION INTRAVENOUS CONTINUOUS PRN
Status: DISCONTINUED | OUTPATIENT
Start: 2020-11-30 | End: 2020-11-30

## 2020-11-30 RX ORDER — OXYCODONE AND ACETAMINOPHEN 5; 325 MG/1; MG/1
1-2 TABLET ORAL EVERY 4 HOURS PRN
Qty: 12 TABLET | Refills: 0 | Status: SHIPPED | OUTPATIENT
Start: 2020-11-30 | End: 2020-12-15

## 2020-11-30 RX ORDER — KETOROLAC TROMETHAMINE 30 MG/ML
INJECTION, SOLUTION INTRAMUSCULAR; INTRAVENOUS PRN
Status: DISCONTINUED | OUTPATIENT
Start: 2020-11-30 | End: 2020-11-30

## 2020-11-30 RX ORDER — SODIUM CHLORIDE, SODIUM LACTATE, POTASSIUM CHLORIDE, CALCIUM CHLORIDE 600; 310; 30; 20 MG/100ML; MG/100ML; MG/100ML; MG/100ML
INJECTION, SOLUTION INTRAVENOUS CONTINUOUS
Status: DISCONTINUED | OUTPATIENT
Start: 2020-11-30 | End: 2020-11-30 | Stop reason: HOSPADM

## 2020-11-30 RX ORDER — OXYCODONE HYDROCHLORIDE 5 MG/1
5 TABLET ORAL EVERY 4 HOURS PRN
Status: DISCONTINUED | OUTPATIENT
Start: 2020-11-30 | End: 2020-11-30 | Stop reason: HOSPADM

## 2020-11-30 RX ORDER — HYDROMORPHONE HYDROCHLORIDE 1 MG/ML
.3-.5 INJECTION, SOLUTION INTRAMUSCULAR; INTRAVENOUS; SUBCUTANEOUS EVERY 10 MIN PRN
Status: DISCONTINUED | OUTPATIENT
Start: 2020-11-30 | End: 2020-11-30 | Stop reason: HOSPADM

## 2020-11-30 RX ORDER — NALOXONE HYDROCHLORIDE 0.4 MG/ML
0.2 INJECTION, SOLUTION INTRAMUSCULAR; INTRAVENOUS; SUBCUTANEOUS
Status: DISCONTINUED | OUTPATIENT
Start: 2020-11-30 | End: 2020-11-30 | Stop reason: HOSPADM

## 2020-11-30 RX ORDER — MEPERIDINE HYDROCHLORIDE 50 MG/ML
12.5 INJECTION INTRAMUSCULAR; INTRAVENOUS; SUBCUTANEOUS
Status: DISCONTINUED | OUTPATIENT
Start: 2020-11-30 | End: 2020-11-30 | Stop reason: HOSPADM

## 2020-11-30 RX ORDER — CLINDAMYCIN PHOSPHATE 900 MG/50ML
900 INJECTION, SOLUTION INTRAVENOUS SEE ADMIN INSTRUCTIONS
Status: DISCONTINUED | OUTPATIENT
Start: 2020-11-30 | End: 2020-11-30 | Stop reason: HOSPADM

## 2020-11-30 RX ORDER — BUPIVACAINE HYDROCHLORIDE 2.5 MG/ML
INJECTION, SOLUTION INFILTRATION; PERINEURAL PRN
Status: DISCONTINUED | OUTPATIENT
Start: 2020-11-30 | End: 2020-11-30 | Stop reason: HOSPADM

## 2020-11-30 RX ORDER — LIDOCAINE 40 MG/G
CREAM TOPICAL
Status: DISCONTINUED | OUTPATIENT
Start: 2020-11-30 | End: 2020-11-30 | Stop reason: HOSPADM

## 2020-11-30 RX ORDER — FENTANYL CITRATE 50 UG/ML
25-50 INJECTION, SOLUTION INTRAMUSCULAR; INTRAVENOUS
Status: DISCONTINUED | OUTPATIENT
Start: 2020-11-30 | End: 2020-11-30 | Stop reason: HOSPADM

## 2020-11-30 RX ORDER — SCOLOPAMINE TRANSDERMAL SYSTEM 1 MG/1
1 PATCH, EXTENDED RELEASE TRANSDERMAL
Status: DISCONTINUED | OUTPATIENT
Start: 2020-11-30 | End: 2020-11-30 | Stop reason: HOSPADM

## 2020-11-30 RX ORDER — CLINDAMYCIN PHOSPHATE 900 MG/50ML
900 INJECTION, SOLUTION INTRAVENOUS
Status: DISCONTINUED | OUTPATIENT
Start: 2020-11-30 | End: 2020-11-30 | Stop reason: HOSPADM

## 2020-11-30 RX ORDER — LIDOCAINE HYDROCHLORIDE 20 MG/ML
INJECTION, SOLUTION INFILTRATION; PERINEURAL PRN
Status: DISCONTINUED | OUTPATIENT
Start: 2020-11-30 | End: 2020-11-30

## 2020-11-30 RX ORDER — AMOXICILLIN 250 MG
1-2 CAPSULE ORAL 2 TIMES DAILY
Qty: 30 TABLET | Refills: 0 | Status: SHIPPED | OUTPATIENT
Start: 2020-11-30 | End: 2020-12-15

## 2020-11-30 RX ORDER — FENTANYL CITRATE 50 UG/ML
INJECTION, SOLUTION INTRAMUSCULAR; INTRAVENOUS PRN
Status: DISCONTINUED | OUTPATIENT
Start: 2020-11-30 | End: 2020-11-30

## 2020-11-30 RX ORDER — ONDANSETRON 2 MG/ML
INJECTION INTRAMUSCULAR; INTRAVENOUS PRN
Status: DISCONTINUED | OUTPATIENT
Start: 2020-11-30 | End: 2020-11-30

## 2020-11-30 RX ORDER — ONDANSETRON 4 MG/1
4 TABLET, ORALLY DISINTEGRATING ORAL EVERY 30 MIN PRN
Status: DISCONTINUED | OUTPATIENT
Start: 2020-11-30 | End: 2020-11-30 | Stop reason: HOSPADM

## 2020-11-30 RX ORDER — KETAMINE HYDROCHLORIDE 10 MG/ML
INJECTION INTRAMUSCULAR; INTRAVENOUS PRN
Status: DISCONTINUED | OUTPATIENT
Start: 2020-11-30 | End: 2020-11-30

## 2020-11-30 RX ORDER — PROPOFOL 10 MG/ML
INJECTION, EMULSION INTRAVENOUS PRN
Status: DISCONTINUED | OUTPATIENT
Start: 2020-11-30 | End: 2020-11-30

## 2020-11-30 RX ORDER — DEXAMETHASONE SODIUM PHOSPHATE 4 MG/ML
INJECTION, SOLUTION INTRA-ARTICULAR; INTRALESIONAL; INTRAMUSCULAR; INTRAVENOUS; SOFT TISSUE PRN
Status: DISCONTINUED | OUTPATIENT
Start: 2020-11-30 | End: 2020-11-30

## 2020-11-30 RX ADMIN — KETOROLAC TROMETHAMINE 30 MG: 30 INJECTION, SOLUTION INTRAMUSCULAR at 14:07

## 2020-11-30 RX ADMIN — DEXAMETHASONE SODIUM PHOSPHATE 4 MG: 4 INJECTION, SOLUTION INTRA-ARTICULAR; INTRALESIONAL; INTRAMUSCULAR; INTRAVENOUS; SOFT TISSUE at 13:38

## 2020-11-30 RX ADMIN — CLINDAMYCIN IN 5 PERCENT DEXTROSE 900 MG: 18 INJECTION, SOLUTION INTRAVENOUS at 13:27

## 2020-11-30 RX ADMIN — PROPOFOL 200 MG: 10 INJECTION, EMULSION INTRAVENOUS at 13:31

## 2020-11-30 RX ADMIN — LIDOCAINE HYDROCHLORIDE 0.1 ML: 10 INJECTION, SOLUTION EPIDURAL; INFILTRATION; INTRACAUDAL; PERINEURAL at 10:45

## 2020-11-30 RX ADMIN — ONDANSETRON HYDROCHLORIDE 4 MG: 2 SOLUTION INTRAMUSCULAR; INTRAVENOUS at 14:34

## 2020-11-30 RX ADMIN — FENTANYL CITRATE 50 MCG: 50 INJECTION, SOLUTION INTRAMUSCULAR; INTRAVENOUS at 14:39

## 2020-11-30 RX ADMIN — FENTANYL CITRATE 50 MCG: 50 INJECTION, SOLUTION INTRAMUSCULAR; INTRAVENOUS at 13:48

## 2020-11-30 RX ADMIN — ROCURONIUM BROMIDE 40 MG: 10 INJECTION INTRAVENOUS at 13:31

## 2020-11-30 RX ADMIN — SODIUM CHLORIDE, POTASSIUM CHLORIDE, SODIUM LACTATE AND CALCIUM CHLORIDE 100 ML/HR: 600; 310; 30; 20 INJECTION, SOLUTION INTRAVENOUS at 10:45

## 2020-11-30 RX ADMIN — FENTANYL CITRATE 50 MCG: 50 INJECTION, SOLUTION INTRAMUSCULAR; INTRAVENOUS at 14:26

## 2020-11-30 RX ADMIN — OXYCODONE AND ACETAMINOPHEN 1 TABLET: 5; 325 TABLET ORAL at 15:19

## 2020-11-30 RX ADMIN — Medication 30 MG: at 13:28

## 2020-11-30 RX ADMIN — MIDAZOLAM 2 MG: 1 INJECTION INTRAMUSCULAR; INTRAVENOUS at 13:28

## 2020-11-30 RX ADMIN — FENTANYL CITRATE 100 MCG: 50 INJECTION, SOLUTION INTRAMUSCULAR; INTRAVENOUS at 14:11

## 2020-11-30 RX ADMIN — FENTANYL CITRATE 50 MCG: 50 INJECTION, SOLUTION INTRAMUSCULAR; INTRAVENOUS at 13:28

## 2020-11-30 RX ADMIN — ROCURONIUM BROMIDE 10 MG: 10 INJECTION INTRAVENOUS at 13:51

## 2020-11-30 RX ADMIN — SUGAMMADEX 200 MG: 100 INJECTION, SOLUTION INTRAVENOUS at 14:07

## 2020-11-30 RX ADMIN — LIDOCAINE HYDROCHLORIDE 60 MG: 20 INJECTION, SOLUTION INFILTRATION; PERINEURAL at 13:31

## 2020-11-30 RX ADMIN — SODIUM CHLORIDE, POTASSIUM CHLORIDE, SODIUM LACTATE AND CALCIUM CHLORIDE: 600; 310; 30; 20 INJECTION, SOLUTION INTRAVENOUS at 14:01

## 2020-11-30 RX ADMIN — FENTANYL CITRATE 50 MCG: 50 INJECTION, SOLUTION INTRAMUSCULAR; INTRAVENOUS at 13:51

## 2020-11-30 RX ADMIN — ONDANSETRON 4 MG: 2 INJECTION INTRAMUSCULAR; INTRAVENOUS at 13:31

## 2020-11-30 RX ADMIN — SCOPALAMINE 1 PATCH: 1 PATCH, EXTENDED RELEASE TRANSDERMAL at 11:02

## 2020-11-30 RX ADMIN — PROPOFOL 75 MCG/KG/MIN: 10 INJECTION, EMULSION INTRAVENOUS at 13:31

## 2020-11-30 ASSESSMENT — MIFFLIN-ST. JEOR: SCORE: 1862.16

## 2020-11-30 NOTE — ANESTHESIA PREPROCEDURE EVALUATION
Anesthesia Pre-Procedure Evaluation    Patient: Hi Kinney   MRN: [id number] : 1995          Preoperative Diagnosis: Cholelithiasis [K80.20]    Procedure(s):  CHOLECYSTECTOMY, LAPAROSCOPIC    Past Medical History:   Diagnosis Date     Calculus of kidney 2019     Past Surgical History:   Procedure Laterality Date     COMBINED CYSTOSCOPY, RETROGRADES, URETEROSCOPY, LASER HOLMIUM LITHOTRIPSY URETER(S), INSERT STENT Right 2020    Procedure: Right ureteroscopy with holmium laser lithotripsy and stent placement;  Surgeon: Veto Peralta MD;  Location:  OR     COMBINED CYSTOSCOPY, URETEROSCOPY, LASER HOLMIUM LITHOTRIPSY URETER(S) Left 3/17/2019    Procedure: COMBINED CYSTOSCOPY, URETEROSCOPY, LASER HOLMIUM LITHOTRIPSY URETER(S)and stent placement;  Surgeon: Veto Peralta MD;  Location:  OR     COMBINED CYSTOSCOPY, URETEROSCOPY, LASER HOLMIUM LITHOTRIPSY URETER(S) Left 2019    Procedure: COMBINED CYSTOSCOPY, URETEROSCOPY, LASER HOLMIUM LITHOTRIPSY URETER(S);  Surgeon: Veto Peralta MD;  Location:  OR     ESOPHAGOSCOPY, GASTROSCOPY, DUODENOSCOPY (EGD), COMBINED N/A 2019    Procedure: ESOPHAGOGASTRODUODENOSCOPY, WITH BIOPSY;  Surgeon: Arnie García MD;  Location:  OR     GRAFT SKIN FULL THICKNESS FROM EXTREMITY Right     right calf     TONSILLECTOMY      No Comments Provided       Anesthesia Evaluation     . Pt has had prior anesthetic.     No history of anesthetic complications          ROS/MED HX    ENT/Pulmonary:  - neg pulmonary ROS     Neurologic:  - neg neurologic ROS     Cardiovascular:  - neg cardiovascular ROS       METS/Exercise Tolerance:  >4 METS   Hematologic:  - neg hematologic  ROS       Musculoskeletal:  - neg musculoskeletal ROS       GI/Hepatic:     (+) cholecystitis/cholelithiasis,       Renal/Genitourinary:      (-) nephrolithiasis   Endo:  - neg endo ROS       Psychiatric:  - neg psychiatric ROS       Infectious Disease:  - neg infectious disease ROS      "  Malignancy:      - no malignancy   Other:    - neg other ROS                      Physical Exam  Normal systems: cardiovascular, pulmonary and dental    Airway   Mallampati: I  TM distance: >3 FB  Neck ROM: full    Dental     Cardiovascular   Rhythm and rate: regular and normal      Pulmonary    breath sounds clear to auscultation            Lab Results   Component Value Date    WBC 15.9 (H) 08/31/2020    HGB 16.7 08/31/2020    HCT 49.4 08/31/2020     08/31/2020     11/20/2020    POTASSIUM 3.9 11/20/2020    CHLORIDE 104 11/20/2020    CO2 28 11/20/2020    BUN 14 11/20/2020    CR 1.03 11/20/2020    GLC 95 11/20/2020    ALICE 9.7 11/20/2020    PHOS 1.8 (L) 03/25/2019    MAG 1.9 03/25/2019    ALBUMIN 4.5 11/21/2019    PROTTOTAL 7.0 11/21/2019    ALT 29 11/21/2019    AST 15 11/21/2019    ALKPHOS 80 11/21/2019    BILITOTAL 0.9 11/21/2019    LIPASE 9 (L) 11/21/2019    PTT 31 11/17/2019    INR 1.05 11/17/2019       Preop Vitals  BP Readings from Last 3 Encounters:   11/30/20 127/88   11/23/20 112/60   08/31/20 112/56    Pulse Readings from Last 3 Encounters:   11/23/20 87   09/10/20 104   08/31/20 94      Resp Readings from Last 3 Encounters:   11/30/20 12   11/23/20 16   09/10/20 12    SpO2 Readings from Last 3 Encounters:   11/30/20 96%   11/23/20 98%   08/31/20 98%      Temp Readings from Last 1 Encounters:   11/30/20 97.7  F (36.5  C) (Tympanic)    Ht Readings from Last 1 Encounters:   11/30/20 1.676 m (5' 6\")      Wt Readings from Last 1 Encounters:   11/30/20 93.4 kg (206 lb)    Estimated body mass index is 33.25 kg/m  as calculated from the following:    Height as of this encounter: 1.676 m (5' 6\").    Weight as of this encounter: 93.4 kg (206 lb).       Anesthesia Plan      History & Physical Review      ASA Status:  1 .    NPO Status:  > 8 hours    Plan for General with Intravenous and Propofol induction. Maintenance will be Balanced.             Postoperative Care      Consents  Anesthetic plan, " risks, benefits and alternatives discussed with:  Patient.  Use of blood products discussed: No .   .                 JASMINE Easton CRNA

## 2020-11-30 NOTE — ANESTHESIA POSTPROCEDURE EVALUATION
Patient: Hi Kinney    Procedure(s):  CHOLECYSTECTOMY, LAPAROSCOPIC    Diagnosis:Cholelithiasis [K80.20]  Diagnosis Additional Information: No value filed.    Anesthesia Type:  General    Note:  Anesthesia Post Evaluation    Patient location during evaluation: PACU  Patient participation: Able to fully participate in evaluation  Level of consciousness: awake and alert  Pain management: adequate  Airway patency: patent  Cardiovascular status: acceptable  Respiratory status: acceptable  Hydration status: acceptable  PONV: none     Anesthetic complications: None          Last vitals:  Vitals:    11/30/20 1440 11/30/20 1445 11/30/20 1500   BP: 134/77  123/62   Pulse: 95 101 100   Resp:  17    Temp:   97.4  F (36.3  C)   SpO2: 95% 92% 96%         Electronically Signed By: JASMINE Easton CRNA  November 30, 2020  4:19 PM

## 2020-11-30 NOTE — ANESTHESIA CARE TRANSFER NOTE
Patient: Hi Kinney    Procedure(s):  CHOLECYSTECTOMY, LAPAROSCOPIC    Diagnosis: Cholelithiasis [K80.20]  Diagnosis Additional Information: No value filed.    Anesthesia Type:   General     Note:  Airway :Face Mask (Patient spont . breathing well. TV>450cc. RR 25 SpO2 99%. Patient transported on O2 @ 10L/min)  Patient transferred to:PACU  Handoff Report: Identifed the Patient, Identified the Reponsible Provider, Reviewed the pertinent medical history, Discussed the surgical course, Reviewed Intra-OP anesthesia mangement and issues during anesthesia, Set expectations for post-procedure period and Allowed opportunity for questions and acknowledgement of understanding      Vitals: (Last set prior to Anesthesia Care Transfer)    CRNA VITALS  11/30/2020 1346 - 11/30/2020 1418      11/30/2020             Resp Rate (set):  10                Electronically Signed By: David Kellerman, APRN CRNA  November 30, 2020  2:18 PM

## 2020-11-30 NOTE — OP NOTE
PREOPERATIVE  DIAGNOSIS: Chronic cholecystitis/cholelithiasis.       POSTOPERATIVE DIAGNOSIS: Chronic cholecystitis/cholelithiasis.      PROCEDURE PERFORMED:  Laparoscopic cholecystectomy.    SURGEON:  Arnie García MD MD    ASSISTANT:     Circulator: Celena Burrell RN  Scrub Person: Alissa Robertson; Eva Wiseman  First Assistant: Jed Rios RN  Assist Patient Positioning: Jaye García RN  Pre-Op Nurse: Lawanda Dacosta RN      ANESTHESIA: General, CRNA Independent: Kellerman, David, APRN CRNA; Alejandra Angelo APRN CRNA    FAMILY PHYSICIAN: Vero García    REFERRING PROVIDER:      INDICATION FOR THE PROCEDURE:  Thepatient is a 25 year old male with a history of right upper quadrant abdominal pain radiating to the back and the shoulder.  The patient's ultrasound shows  stones.  His liver functions were within normal limits.     PROCEDURE:  After adequate general anesthesia, the patient was prepped and draped inthe usual sterile fashion.  A supraumbilical 5 mm incision was made and the abdomen insufflated with Veress needle using CO2 after a drop test. The abdomen was insufflated with carbon dioxide to a pressure of 15 mmHg. The abdomen was then entered using the Visiport technique.  Under direct vision, an 11mm epigastric and two 5 mm right-sided ports were placed.  The initial port site was inspected and was unremarkable.  The gallbladder was then grasped and held on traction.    Dissection was carried down to Venessa s pouch . The cystic duct was visualized and dissected free circumferentially.  Adjacent to that was the cystic artery was dissected free. When a satisfactory critical view of safety was obtained we proceeded to clip and divide the cystic duct and the cystic artery. The gallbladder was then taken out of the hepatic bed using the hook cautery and maintaining good hemostasis throughout.  There was some spillage of bile.  The gallbladder was placed in an retrieval pouch and  removed through the epigastric port site.  There was no spillage.  There was good hemostasis.  The RUQ was irrigated and suctioned.   The ports were removed under direct vision.  The abdomen was deflated and the epigastric port removed.  The epigastric fascia defect was closed with an 0 Vicryl suture.  The wound was infiltrated with marcaine, the dermis approximated with 4-0 Monocryl intracuticular suture.  Proxi-Strips and clean, dry dressings were applied.  The patient was taken to the recovery room in stable condition.    Arnie García MD on 11/30/2020 at 2:42 PM     Ml:Vero García

## 2020-11-30 NOTE — ANESTHESIA PROCEDURE NOTES
Airway   Date/Time: 11/30/2020 1:34 PM   Patient location during procedure: OR    Staff -   CRNA: Alejandra Angelo APRN CRNA  Performed By: CRNA    Consent for Airway   Urgency: elective    Indications and Patient Condition  Indications for airway management: torres-procedural  Induction type:intravenousMask difficulty assessment: 1 - vent by mask    Final Airway Details  Final airway type: endotracheal airway  Successful airway:ETT - single  Endotracheal Airway Details   ETT size (mm): 8.0  Cuffed: yes  Successful intubation technique: direct laryngoscopy  Grade View of Cords: 1  Measured from: gums/teeth  Secured at (cm): 24  Secured with: silk tape  Bite block used: None    Post intubation assessment   Placement verified by: capnometry, equal breath sounds and chest rise   Number of attempts at approach: 1  Number of other approaches attempted: 0  Secured with:silk tape  Ease of procedure: easy  Dentition: Intact and Unchanged

## 2020-11-30 NOTE — INTERVAL H&P NOTE
I saw and examined Hi Kinney.  I have reviewed the history and physical and find no changes to the patient's medical status or condition with the exceptions noted below.     Arnie García MD   12:34 PM 11/30/2020

## 2020-12-15 ENCOUNTER — OFFICE VISIT (OUTPATIENT)
Dept: SURGERY | Facility: OTHER | Age: 25
End: 2020-12-15
Attending: SURGERY
Payer: COMMERCIAL

## 2020-12-15 VITALS
WEIGHT: 209.6 LBS | OXYGEN SATURATION: 99 % | SYSTOLIC BLOOD PRESSURE: 118 MMHG | TEMPERATURE: 97.7 F | BODY MASS INDEX: 33.83 KG/M2 | DIASTOLIC BLOOD PRESSURE: 70 MMHG | HEART RATE: 74 BPM

## 2020-12-15 DIAGNOSIS — K82.8 BILIARY DYSKINESIA: Primary | ICD-10-CM

## 2020-12-15 PROCEDURE — 99024 POSTOP FOLLOW-UP VISIT: CPT | Performed by: SURGERY

## 2020-12-15 ASSESSMENT — PAIN SCALES - GENERAL: PAINLEVEL: NO PAIN (0)

## 2020-12-15 NOTE — PROGRESS NOTES
Patient presents for post surgical visit after laparoscopic cholecystectomy on 11/30. Patient has done well. No problems with incisions.  Had some right flank pain post op.  He also had some loose stools that were self limited.     /70 (BP Location: Right arm, Patient Position: Sitting, Cuff Size: Adult Regular)   Pulse 74   Temp 97.7  F (36.5  C) (Tympanic)   Wt 95.1 kg (209 lb 9.6 oz)   SpO2 99%   BMI 33.83 kg/m      General: NAD, pleasant and cooperative with exam and interview.  Abdomen: healing incisions. No sign of infection. No pain with palpation.  Psychiatry: awake, alert and oriented. Appropriate affect.    Assessment/Plan:  Discussed surgery and pathology results. Patient can return to normalactivities. Patient will call with questions or concerns.    - Follow-up as needed.     Arnie García MD on 12/15/2020 at 9:33 AM

## 2020-12-16 NOTE — PATIENT INSTRUCTIONS
"Patient Education     Viral Gastroenteritis (Adult)    Gastroenteritis is commonly called the \"stomach flu,\" although it has nothing to do with influenza. It is most often caused by a virus that affects the stomach and intestinal tract and usually lasts from 2 to 7 days. Common viruses causing gastroenteritis include norovirus, rotavirus, and hepatitis A. Non-viral causes of gastroenteritis include bacteria, parasites, and toxins.  The danger from repeated vomiting or diarrhea is dehydration. This is the loss of too much fluid from the body. When this occurs, body fluids must be replaced. Antibiotics don't help with this illness because it is usually viral. Simple home treatment will be helpful.  Symptoms of viral gastroenteritis may include:    Watery, loose stools    Stomach pain or abdominal cramps    Fever and chills    Nausea and vomiting    Loss of bowel control    Headache  Home care  Gastroenteritis is transmitted by contact with the stool or vomit of an infected person. This can occur from person to person or from contact with a contaminated surface.  Follow these guidelines when caring for yourself at home:    If symptoms are severe, rest at home for the next 24 hours or until you are feeling better.    Wash your hands with soap and water or use alcohol-based  to prevent the spread of infection. Wash your hands after touching anyone who is sick.    Wash your hands or use alcohol-based  after using the toilet and before meals. Clean the toilet after each use.  Remember these tips when preparing food:    People with diarrhea should not prepare or serve food to others. When preparing foods, wash your hands before and after.    Wash your hands after using cutting boards, countertops, knives, or utensils that have been in contact with raw food.    Dry your hands with a single use towel.    Keep uncooked meats away from cooked and ready-to-eat foods.  Medicine  You may use acetaminophen or " Called no answer left vm to call my direct number to help schedule appointment.    NSAID medicines like ibuprofen or naproxen to control fever unless another medicine was given. If you have chronic liver or kidney disease, talk with your healthcare provider before using these medicines. Also talk with your provider if you've had a stomach ulcer or gastrointestinal bleeding. Don't give aspirin to anyone under 18 years of age who is ill with a fever. It may cause severe liver damage. Don't use NSAIDS is you are already taking one for another condition (like arthritis) or are on aspirin (such as for heart disease or after a stroke).  If medicine for vomiting or diarrhea are prescribed, take these only as directed. Nausea and diarrhea medicines are generally OK unless you have bleeding, fever, or severe abdominal pain.  Diet  Follow these guidelines for food:    Water and liquids are important so you don't get dehydrated. Drink a small amount at a time or suck on ice chips if you are vomiting.    If you eat, avoid fatty, greasy, spicy, or fried foods.    Don't eat dairy if you have diarrhea. This can make diarrhea worse.    Avoid tobacco, alcohol, and caffeine which may worsen symptoms.  During the first 24 hours (the first full day), follow the diet below:    Beverages. Sports drinks, soft drinks without caffeine, ginger ale, mineral water (plain or flavored), decaffeinated tea and coffee. If you are very dehydrated, sports drinks aren't a good choice. They have too much sugar and not enough electrolytes. In this case, commercially available products called oral rehydration solutions, are best.    Soups. Eat clear broth, consommé, and bouillon.    Desserts. Eat gelatin, ice pops, and fruit juice bars.  During the next 24 hours (the second day), you may add the following to the above:    Hot cereal, plain toast, bread, rolls, and crackers    Plain noodles, rice, mashed potatoes, chicken noodle or rice soup    Unsweetened canned fruit (avoid pineapple), bananas    Limit fat intake to less than 15 grams  per day. Do this by avoiding margarine, butter, oils, mayonnaise, sauces, gravies, fried foods, peanut butter, meat, poultry, and fish.    Limit fiber and avoid raw or cooked vegetables, fresh fruits (except bananas), and bran cereals.    Limit caffeine and chocolate. Don't use spices or seasonings other than salt.    Limit dairy products.    Avoid alcohol.  During the next 24 hours:    Gradually resume a normal diet as you feel better and your symptoms improve.    If at any time it starts getting worse again, go back to clear liquids until you feel better.  Follow-up care  Follow up with your healthcare provider, or as advised. Call your provider if you don't get better within 24 hours or if diarrhea lasts more than a week. Also follow up if you are unable to keep down liquids and get dehydrated. If a stool (diarrhea) sample was taken, call as directed for the results.  Call 911  Call 911 if any of these occur:    Trouble breathing    Chest pain    Confused    Severe drowsiness or trouble awakening    Fainting or loss of consciousness    Rapid heart rate    Seizure    Stiff neck  When to seek medical advice  Call your healthcare provider right away if any of these occur:    Abdominal pain that gets worse    Continued vomiting (unable to keep liquids down)    Frequent diarrhea (more than 5 times a day)    Blood in vomit or stool (black or red color)    Dark urine, reduced urine output, or extreme thirst    Weakness or dizziness    Drowsiness    Fever of 100.4 F (38 C) or higher, or as directed by your healthcare provider    New rash  Date Last Reviewed: 6/1/2018 2000-2019 [employer]. [address] [place] [address], [place] [address]. All rights reserved. This information is not intended as a substitute for professional medical care. Always follow your healthcare professional's instructions.

## 2021-01-29 ENCOUNTER — OFFICE VISIT (OUTPATIENT)
Dept: FAMILY MEDICINE | Facility: OTHER | Age: 26
End: 2021-01-29
Attending: NURSE PRACTITIONER
Payer: COMMERCIAL

## 2021-01-29 ENCOUNTER — TELEPHONE (OUTPATIENT)
Dept: FAMILY MEDICINE | Facility: OTHER | Age: 26
End: 2021-01-29

## 2021-01-29 VITALS
DIASTOLIC BLOOD PRESSURE: 80 MMHG | WEIGHT: 209.8 LBS | HEIGHT: 68 IN | HEART RATE: 71 BPM | TEMPERATURE: 95.5 F | RESPIRATION RATE: 16 BRPM | BODY MASS INDEX: 31.8 KG/M2 | SYSTOLIC BLOOD PRESSURE: 122 MMHG | OXYGEN SATURATION: 97 %

## 2021-01-29 DIAGNOSIS — L08.89 PITTED KERATOLYSIS: Primary | ICD-10-CM

## 2021-01-29 PROCEDURE — 99213 OFFICE O/P EST LOW 20 MIN: CPT | Performed by: NURSE PRACTITIONER

## 2021-01-29 RX ORDER — ERYTHROMYCIN AND BENZOYL PEROXIDE 30; 50 MG/G; MG/G
GEL TOPICAL 2 TIMES DAILY
Qty: 46.6 G | Refills: 1 | Status: SHIPPED | OUTPATIENT
Start: 2021-01-29 | End: 2021-04-15

## 2021-01-29 RX ORDER — ERYTHROMYCIN AND BENZOYL PEROXIDE 30; 50 MG/G; MG/G
GEL TOPICAL 2 TIMES DAILY
Qty: 46.6 G | Refills: 3 | Status: SHIPPED | OUTPATIENT
Start: 2021-01-29 | End: 2021-04-15

## 2021-01-29 RX ORDER — NYSTATIN 100000 U/G
CREAM TOPICAL 2 TIMES DAILY
COMMUNITY
End: 2021-04-15

## 2021-01-29 RX ORDER — TRIAMCINOLONE ACETONIDE 0.25 MG/G
OINTMENT TOPICAL 2 TIMES DAILY
COMMUNITY
End: 2021-04-15

## 2021-01-29 SDOH — HEALTH STABILITY: MENTAL HEALTH: HOW OFTEN DO YOU HAVE 6 OR MORE DRINKS ON ONE OCCASION?: NOT ASKED

## 2021-01-29 SDOH — HEALTH STABILITY: MENTAL HEALTH: HOW OFTEN DO YOU HAVE A DRINK CONTAINING ALCOHOL?: 2-4 TIMES A MONTH

## 2021-01-29 SDOH — HEALTH STABILITY: MENTAL HEALTH: HOW MANY STANDARD DRINKS CONTAINING ALCOHOL DO YOU HAVE ON A TYPICAL DAY?: NOT ASKED

## 2021-01-29 SDOH — HEALTH STABILITY: MENTAL HEALTH: HOW OFTEN DO YOU HAVE SIX OR MORE DRINKS ON ONE OCCASION?: NOT ASKED

## 2021-01-29 SDOH — HEALTH STABILITY: MENTAL HEALTH: HOW MANY DRINKS CONTAINING ALCOHOL DO YOU HAVE ON A TYPICAL DAY WHEN YOU ARE DRINKING?: NOT ASKED

## 2021-01-29 ASSESSMENT — MIFFLIN-ST. JEOR: SCORE: 1911.15

## 2021-01-29 ASSESSMENT — PAIN SCALES - GENERAL: PAINLEVEL: MILD PAIN (2)

## 2021-01-29 NOTE — TELEPHONE ENCOUNTER
After verifying patient's name and date of birth, patient was given the below information.  Emmie Rodríguez....1/29/2021 3:38 PM e

## 2021-01-29 NOTE — PROGRESS NOTES
"HPI:    Hi Kinney is a 25 year old male who presents to clinic today for rash.  He reports he has had this rash on his feet for several months and may be even more than a year.  He has been seen in the past and [facility] and was treated for athlete's foot with both steroid creams and antifungal creams.  He states these are minimally helpful.  Rashes on the soles of both feet and is somewhat painful.    Past Medical History:   Diagnosis Date     Calculus of kidney 4/27/2019         Current Outpatient Medications   Medication Sig Dispense Refill     acetaminophen (TYLENOL) 325 MG tablet Take 2 tablets (650 mg) by mouth every 4 hours as needed for other (mild pain) 100 tablet 0     benzoyl peroxide-erythromycin (BENZAMYCIN) 5-3 % external gel Apply topically 2 times daily 46.6 g 1     hydrOXYzine (ATARAX) 25 MG tablet Take 1 tablet (25 mg) by mouth 3 times daily as needed for itching 60 tablet 0     ibuprofen (ADVIL/MOTRIN) 600 MG tablet Take 1 tablet (600 mg) by mouth every 6 hours as needed for pain (mild) 30 tablet 0     nystatin (MYCOSTATIN) 630789 UNIT/GM external cream Apply topically 2 times daily       potassium citrate 15 MEQ (1620 MG) TBCR Take 2 tablets by mouth 2 times daily (with meals) 360 tablet 3     triamcinolone (KENALOG) 0.025 % external ointment Apply topically 2 times daily       benzoyl peroxide-erythromycin (BENZAMYCIN) 5-3 % external gel Apply topically 2 times daily 46.6 g 3       Allergies   Allergen Reactions     Augmentin Rash     Childhood reaction     Cefprozil Rash     Other reaction(s): *Unknown - Childhood Rxn       ROS:  Pertinent positives and negatives are noted in HPI.    EXAM:  /80 (BP Location: Right arm, Patient Position: Sitting, Cuff Size: Adult Regular)   Pulse 71   Temp 95.5  F (35.3  C) (Tympanic)   Resp 16   Ht 1.727 m (5' 8\")   Wt 95.2 kg (209 lb 12.8 oz)   SpO2 97%   BMI 31.90 kg/m    General appearance: well appearing male, in no acute " distress  Dermatological: pads of bilateral feet with round lesion with several punched out, white/brown dots in center. Skin is tough and thickened  Psychological: normal affect, alert and pleasant    ASSESSMENT AND PLAN:    1. Pitted keratolysis      Rash consistent with pitted keratolysis.  Treated with benzoyl peroxide and erythromycin ointment twice daily until resolved.  Encouraged him to keep his feet clean and dry.  Follow-up if symptoms progress or worsen.  Consideration of dermatology referral if we are unable to get this under better control.    JASMINE Mott CNP..................1/29/2021 2:14 PM      This document was prepared using voice generated software.  While every attempt was made for accuracy, grammatical errors may exist.

## 2021-01-29 NOTE — NURSING NOTE
"Chief Complaint   Patient presents with     Derm Problem     athlete's foot     Patient presents to clinic with rash on feet/athlete's foot. He has been using Nystatin cream and Kenalog for a couple years, but he said it really isn't working for him anymore.     Initial /80 (BP Location: Right arm, Patient Position: Sitting, Cuff Size: Adult Regular)   Pulse 71   Temp 95.5  F (35.3  C) (Tympanic)   Resp 16   Ht 1.727 m (5' 8\")   Wt 95.2 kg (209 lb 12.8 oz)   SpO2 97%   BMI 31.90 kg/m   Estimated body mass index is 31.9 kg/m  as calculated from the following:    Height as of this encounter: 1.727 m (5' 8\").    Weight as of this encounter: 95.2 kg (209 lb 12.8 oz).         Medication Reconciliation: Complete      Emmie Rodríguez   "

## 2021-01-29 NOTE — TELEPHONE ENCOUNTER
Patient given a prescription for Benzamycin today. [facility] has this on backorder. Patient would like to know if the prescription can be sent to a different pharmacy or is there some other medication he could use?    Wendy Dunaway on 1/29/2021 at 2:58 PM

## 2021-02-01 PROBLEM — L08.89 PITTED KERATOLYSIS: Status: ACTIVE | Noted: 2021-02-01

## 2021-02-02 DIAGNOSIS — N20.0 KIDNEY STONES: Primary | ICD-10-CM

## 2021-02-02 NOTE — PROGRESS NOTES
"Per last office visit  11/23/20 with Veto Peralta MD \"Plan  Continue KCitrate 15 mEq - take 2 tablets (30 mEq) BID  Follow up in 6 months with a BMP then once annually with a BMP\"        Orders Placed    "

## 2021-02-03 ENCOUNTER — IMMUNIZATION (OUTPATIENT)
Dept: FAMILY MEDICINE | Facility: OTHER | Age: 26
End: 2021-02-03
Attending: FAMILY MEDICINE
Payer: COMMERCIAL

## 2021-02-03 PROCEDURE — 2894A PR COVID VAC PFIZER DIL RECON 30 MCG/0.3 ML IM: CPT

## 2021-02-12 ENCOUNTER — HOSPITAL ENCOUNTER (EMERGENCY)
Facility: OTHER | Age: 26
Discharge: HOME | End: 2021-02-12
Attending: PHYSICIAN ASSISTANT | Admitting: PHYSICIAN ASSISTANT
Payer: OTHER MISCELLANEOUS

## 2021-02-12 ENCOUNTER — APPOINTMENT (OUTPATIENT)
Dept: GENERAL RADIOLOGY | Facility: OTHER | Age: 26
End: 2021-02-12
Attending: PHYSICIAN ASSISTANT
Payer: OTHER MISCELLANEOUS

## 2021-02-12 VITALS
RESPIRATION RATE: 18 BRPM | SYSTOLIC BLOOD PRESSURE: 135 MMHG | BODY MASS INDEX: 31.1 KG/M2 | TEMPERATURE: 98.2 F | HEART RATE: 109 BPM | HEIGHT: 69 IN | OXYGEN SATURATION: 97 % | DIASTOLIC BLOOD PRESSURE: 95 MMHG | WEIGHT: 210 LBS

## 2021-02-12 DIAGNOSIS — M25.562 LEFT KNEE PAIN: ICD-10-CM

## 2021-02-12 PROCEDURE — 73562 X-RAY EXAM OF KNEE 3: CPT | Mod: LT

## 2021-02-12 PROCEDURE — 99283 EMERGENCY DEPT VISIT LOW MDM: CPT | Performed by: PHYSICIAN ASSISTANT

## 2021-02-12 PROCEDURE — 99282 EMERGENCY DEPT VISIT SF MDM: CPT | Performed by: PHYSICIAN ASSISTANT

## 2021-02-12 ASSESSMENT — MIFFLIN-ST. JEOR: SCORE: 1927.93

## 2021-02-13 ASSESSMENT — ENCOUNTER SYMPTOMS
BACK PAIN: 0
HEMATURIA: 0
CHEST TIGHTNESS: 0
CONFUSION: 0
ABDOMINAL PAIN: 0
BRUISES/BLEEDS EASILY: 0
SHORTNESS OF BREATH: 0
ADENOPATHY: 0
CHILLS: 0
FEVER: 0
WOUND: 0

## 2021-02-13 NOTE — ED PROVIDER NOTES
History     Chief Complaint   Patient presents with     Knee Pain     HPI  Hi Kinney is a 25 year old male who presents to the ED for evaluation of left knee pain. 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  During that process he landed on his left knee on the hard floor and since that time he has had some swelling and discomfort to his left knee.  He still has full range of motion and is able to bear weight, good sensation throughout his leg.  He denies any other injuries or complaints.    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     Priority: Medium     Calculus of kidney 04/27/2019     Priority: Medium        Past Medical History:    Past Medical History:   Diagnosis Date     Calculus of kidney 4/27/2019       Past Surgical History:    Past Surgical History:   Procedure Laterality Date     COMBINED CYSTOSCOPY, RETROGRADES, URETEROSCOPY, LASER HOLMIUM LITHOTRIPSY URETER(S), INSERT STENT Right 8/31/2020    Procedure: Right ureteroscopy with holmium laser lithotripsy and stent placement;  Surgeon: Veto Peralta MD;  Location:  OR     COMBINED CYSTOSCOPY, URETEROSCOPY, LASER HOLMIUM LITHOTRIPSY URETER(S) Left 3/17/2019    Procedure: COMBINED CYSTOSCOPY, URETEROSCOPY, LASER HOLMIUM LITHOTRIPSY URETER(S)and stent placement;  Surgeon: Veto Peralta MD;  Location:  OR      COMBINED CYSTOSCOPY, URETEROSCOPY, LASER HOLMIUM LITHOTRIPSY URETER(S) Left 11/17/2019    Procedure: COMBINED CYSTOSCOPY, URETEROSCOPY, LASER HOLMIUM LITHOTRIPSY URETER(S);  Surgeon: Veto Peralta MD;  Location:  OR     ESOPHAGOSCOPY, GASTROSCOPY, DUODENOSCOPY (EGD), COMBINED N/A 11/22/2019    Procedure: ESOPHAGOGASTRODUODENOSCOPY, WITH BIOPSY;  Surgeon: Arnie García MD;  Location:  OR     GRAFT SKIN FULL THICKNESS FROM EXTREMITY Right     right calf     LAPAROSCOPIC CHOLECYSTECTOMY N/A 11/30/2020    Procedure: CHOLECYSTECTOMY, LAPAROSCOPIC;  Surgeon: Arnie García MD;  Location:  OR     TONSILLECTOMY      No Comments Provided       Family History:    No family history on file.    Social History:  Marital Status:  Single [1]  Social History     Tobacco Use     Smoking status: Never Smoker     Smokeless tobacco: Never Used   Substance Use Topics     Alcohol use: Yes     Frequency: 2-4 times a month     Comment: rare     Drug use: No        Medications:         acetaminophen (TYLENOL) 325 MG tablet       benzoyl peroxide-erythromycin (BENZAMYCIN) 5-3 % external gel       benzoyl peroxide-erythromycin (BENZAMYCIN) 5-3 % external gel       hydrOXYzine (ATARAX) 25 MG tablet       ibuprofen (ADVIL/MOTRIN) 600 MG tablet       nystatin (MYCOSTATIN) 591660 UNIT/GM external cream       potassium citrate 15 MEQ (1620 MG) TBCR       triamcinolone (KENALOG) 0.025 % external ointment          Review of Systems   Constitutional: Negative for chills and fever.   HENT: Negative for congestion.    Eyes: Negative for visual disturbance.   Respiratory: Negative for chest tightness and shortness of breath.    Cardiovascular: Negative for chest pain.   Gastrointestinal: Negative for abdominal pain.   Genitourinary: Negative for hematuria.   Musculoskeletal: Negative for back pain.        Left knee pain and swelling   Skin: Negative for rash and wound.   Neurological: Negative for syncope.   Hematological: Negative for  "adenopathy. Does not bruise/bleed easily.   Psychiatric/Behavioral: Negative for confusion.       Physical Exam   BP: (!) 135/95  Pulse: 109  Temp: 98.2  F (36.8  C)  Resp: 18  Height: 175.3 cm (5' 9\")  Weight: 95.3 kg (210 lb)  SpO2: 97 %      Physical Exam  Constitutional:       General: He is not in acute distress.     Appearance: He is well-developed. He is not diaphoretic.   HENT:      Head: Normocephalic and atraumatic.   Eyes:      General: No scleral icterus.     Conjunctiva/sclera: Conjunctivae normal.   Neck:      Musculoskeletal: Neck supple.   Cardiovascular:      Rate and Rhythm: Normal rate and regular rhythm.   Pulmonary:      Effort: Pulmonary effort is normal.      Breath sounds: Normal breath sounds.   Abdominal:      Palpations: Abdomen is soft.      Tenderness: There is no abdominal tenderness.   Musculoskeletal:         General: Swelling and tenderness present. No deformity.      Comments: Slight swelling and tenderness to palpation of left knee   Lymphadenopathy:      Cervical: No cervical adenopathy.   Skin:     General: Skin is warm and dry.      Findings: No rash.   Neurological:      Mental Status: He is alert and oriented to person, place, and time. Mental status is at baseline.   Psychiatric:         Mood and Affect: Mood normal.         Behavior: Behavior normal.         ED Course        Procedures               Critical Care time:  none               Results for orders placed or performed during the hospital encounter of 02/12/21 (from the past 24 hour(s))   XR Knee Left 3 Views    Narrative    PROCEDURE:  XR KNEE LT 3 VW    HISTORY: fall, left knee swelling    COMPARISON:  None.    TECHNIQUE:  3 views of the left knee were obtained.    FINDINGS:  No fracture or dislocation is identified. The joint spaces  are preserved.  Some accessory ossicles are seen at the tibial  tubercle. There is no knee effusion.      Impression    IMPRESSION: No acute fracture.      JAMIN MAYORGA MD "       Medications - No data to display    Assessments & Plan (with Medical Decision Making)   Pt nontoxic in NAD. Heart, lung, bowel sounds normal, abd soft, nontender to palpation, nondistended. VSS, afebrile.     He has slight swelling and tenderness to his left knee.  He continues to have full range of motion, good distal CMS.    X-rays read as:  No fracture or dislocation is identified. The joint spaces  are preserved.  Some accessory ossicles are seen at the tibial  tubercle. There is no knee effusion.    At this time the patient appears to be having a left knee contusion.  He will continue with rice protocol and conservative treatment.  He is offered crutches but declines.  He will follow-up with PCP if not improving or return ED if there are worsening concerning symptoms.  Understands agrees with plan patient is discharged.    Cabrera Brandt PA-C    I have reviewed the nursing notes.    I have reviewed the findings, diagnosis, plan and need for follow up with the patient.       Discharge Medication List as of 2/12/2021  9:06 PM          Final diagnoses:   Left knee pain       2/12/2021   [facility]     Cabrera Brandt PA  02/13/21 1131

## 2021-02-13 NOTE — DISCHARGE INSTRUCTIONS
Get plenty of fluids and rest.  Use rest, ice, compression, elevation.  Use Tylenol and ibuprofen.  I recommend you get more exercise and work on strength training to help in the future.  Please follow-up with PCP as needed or return the ED if there are worsening or concerning symptoms.

## 2021-02-15 DIAGNOSIS — L08.89 PITTED KERATOLYSIS: Primary | ICD-10-CM

## 2021-02-15 RX ORDER — CLINDAMYCIN AND BENZOYL PEROXIDE 10; 50 MG/G; MG/G
GEL TOPICAL 2 TIMES DAILY
Qty: 50 G | Refills: 3 | Status: SHIPPED | OUTPATIENT
Start: 2021-02-15 | End: 2021-05-26

## 2021-02-15 NOTE — PROGRESS NOTES
Pharmacy is unable to get either alcohol to mix Benzamycin gel.  We will change treatment clindamycin benzoyl peroxide gel.

## 2021-02-24 ENCOUNTER — IMMUNIZATION (OUTPATIENT)
Dept: FAMILY MEDICINE | Facility: OTHER | Age: 26
End: 2021-02-24
Attending: FAMILY MEDICINE
Payer: COMMERCIAL

## 2021-02-24 PROCEDURE — 2894A PR COVID VAC PFIZER DIL RECON 30 MCG/0.3 ML IM: CPT

## 2021-04-15 ENCOUNTER — OFFICE VISIT (OUTPATIENT)
Dept: FAMILY MEDICINE | Facility: OTHER | Age: 26
End: 2021-04-15
Attending: NURSE PRACTITIONER
Payer: COMMERCIAL

## 2021-04-15 VITALS
TEMPERATURE: 96.5 F | SYSTOLIC BLOOD PRESSURE: 118 MMHG | HEIGHT: 69 IN | WEIGHT: 211.6 LBS | HEART RATE: 72 BPM | OXYGEN SATURATION: 97 % | BODY MASS INDEX: 31.34 KG/M2 | RESPIRATION RATE: 16 BRPM | DIASTOLIC BLOOD PRESSURE: 70 MMHG

## 2021-04-15 DIAGNOSIS — F51.01 PRIMARY INSOMNIA: Primary | ICD-10-CM

## 2021-04-15 DIAGNOSIS — F41.9 ANXIETY: ICD-10-CM

## 2021-04-15 PROCEDURE — 99213 OFFICE O/P EST LOW 20 MIN: CPT | Performed by: NURSE PRACTITIONER

## 2021-04-15 RX ORDER — ZOLPIDEM TARTRATE 5 MG/1
5 TABLET ORAL
Qty: 30 TABLET | Refills: 1 | Status: SHIPPED | OUTPATIENT
Start: 2021-04-15 | End: 2021-05-24

## 2021-04-15 RX ORDER — TRAZODONE HYDROCHLORIDE 50 MG/1
50 TABLET, FILM COATED ORAL AT BEDTIME
COMMUNITY
End: 2021-04-15

## 2021-04-15 ASSESSMENT — PATIENT HEALTH QUESTIONNAIRE - PHQ9: 5. POOR APPETITE OR OVEREATING: NOT AT ALL

## 2021-04-15 ASSESSMENT — ANXIETY QUESTIONNAIRES
1. FEELING NERVOUS, ANXIOUS, OR ON EDGE: NOT AT ALL
3. WORRYING TOO MUCH ABOUT DIFFERENT THINGS: NOT AT ALL
6. BECOMING EASILY ANNOYED OR IRRITABLE: NOT AT ALL
7. FEELING AFRAID AS IF SOMETHING AWFUL MIGHT HAPPEN: NOT AT ALL
IF YOU CHECKED OFF ANY PROBLEMS ON THIS QUESTIONNAIRE, HOW DIFFICULT HAVE THESE PROBLEMS MADE IT FOR YOU TO DO YOUR WORK, TAKE CARE OF THINGS AT HOME, OR GET ALONG WITH OTHER PEOPLE: NOT DIFFICULT AT ALL
GAD7 TOTAL SCORE: 0
2. NOT BEING ABLE TO STOP OR CONTROL WORRYING: NOT AT ALL
5. BEING SO RESTLESS THAT IT IS HARD TO SIT STILL: NOT AT ALL

## 2021-04-15 ASSESSMENT — PAIN SCALES - GENERAL: PAINLEVEL: NO PAIN (0)

## 2021-04-15 ASSESSMENT — MIFFLIN-ST. JEOR: SCORE: 1935.44

## 2021-04-15 NOTE — NURSING NOTE
"Patient presents to the clinic toady for medication check.  Patient states the hydroxyzine and trazodone is not working anymore.  Shu Mercado LPN 4/15/2021   1:56 PM    Chief Complaint   Patient presents with     Recheck Medication       Initial /70 (BP Location: Right arm, Patient Position: Sitting, Cuff Size: Adult Regular)   Pulse 72   Temp 96.5  F (35.8  C) (Tympanic)   Resp 16   Ht 1.753 m (5' 9.02\")   Wt 96 kg (211 lb 9.6 oz)   SpO2 97%   BMI 31.23 kg/m   Estimated body mass index is 31.23 kg/m  as calculated from the following:    Height as of this encounter: 1.753 m (5' 9.02\").    Weight as of this encounter: 96 kg (211 lb 9.6 oz).  Medication Reconciliation: complete  Shu Mercado LPN    "

## 2021-04-15 NOTE — PROGRESS NOTES
"HPI:    Hi Kinney is a 25 year old male who presents to clinic today for medication management.He is currently using trazodone 50 mg and hydroxyzine as needed for sleep.  Previously has also tried sleep routine and melatonin.  He reports when he was seen last year for depression anxiety concerns for muscle anxiety he never picked up the sertraline.  He is interested in getting something set up for his anxiety.  He is having difficulties with sleep.  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  He typically would sleep for 6 to 8 hours but now is only sleeping 1 to 2 hours.  This is causing increased anxiety symptoms as well.  He denies any significant depression or suicidal symptoms.    Past Medical History:   Diagnosis Date     Calculus of kidney 4/27/2019         Current Outpatient Medications   Medication Sig Dispense Refill     clindamycin-benzoyl peroxide (BENZACLIN) 1-5 % external gel Apply topically 2 times daily 50 g 3     potassium citrate 15 MEQ (1620 MG) TBCR Take 2 tablets by mouth 2 times daily (with meals) 360 tablet 3     sertraline (ZOLOFT) 50 MG tablet Take 1/2 tablet daily for 6 days then 1 tablet daily 45 tablet 1     zolpidem (AMBIEN) 5 MG tablet Take 1 tablet (5 mg) by mouth nightly as needed for sleep 30 tablet 1       Allergies   Allergen Reactions     Augmentin Rash     Childhood reaction     Cefprozil Rash     Other reaction(s): *Unknown - Childhood Rxn       ROS:  Pertinent positives and negatives are noted in HPI.    EXAM:  /70 (BP Location: Right arm, Patient Position: Sitting, Cuff Size: Adult Regular)   Pulse 72   Temp 96.5  F (35.8  C) (Tympanic)   Resp 16   Ht 1.753 m (5' 9.02\")   Wt 96 kg (211 lb 9.6 oz)   SpO2 97%   BMI 31.23 kg/m    General appearance: well appearing male, in no acute distress  Neck: supple without adenopathy  Respiratory: clear to auscultation bilaterally  Cardiac: RRR with no murmurs  Psychological: normal affect, alert and " pleasant      ASSESSMENT AND PLAN:    1. Primary insomnia    2. Anxiety      Plan to start sertraline 25 mg daily for a week and then increase to 50 mg daily help with anxiety.  I suspect if we get the anxiety under better control the sleep will also.  He was also given a prescription for low-dose Ambien to be used as needed.  He is aware that this is not to be used every single day.  Follow-up in 1 month, sooner if needed.      JASMINE Mott CNP..................4/15/2021 1:58 PM

## 2021-04-16 ASSESSMENT — ANXIETY QUESTIONNAIRES: GAD7 TOTAL SCORE: 0

## 2021-04-19 ENCOUNTER — MYC MEDICAL ADVICE (OUTPATIENT)
Dept: FAMILY MEDICINE | Facility: OTHER | Age: 26
End: 2021-04-19

## 2021-04-20 ENCOUNTER — RESULTS ONLY (OUTPATIENT)
Dept: LAB | Age: 26
End: 2021-04-20

## 2021-04-21 LAB
LABORATORY COMMENT REPORT: NORMAL
SARS-COV-2 RNA RESP QL NAA+PROBE: NEGATIVE
SPECIMEN SOURCE: NORMAL

## 2021-04-25 ENCOUNTER — HEALTH MAINTENANCE LETTER (OUTPATIENT)
Age: 26
End: 2021-04-25

## 2021-04-29 ENCOUNTER — TELEPHONE (OUTPATIENT)
Dept: UROLOGY | Facility: OTHER | Age: 26
End: 2021-04-29

## 2021-04-29 NOTE — TELEPHONE ENCOUNTER
Attempted to reach patient x3 to schedule a 6 month follow up.  Letter sent.    Maryellen García on 4/29/2021 at 2:09 PM

## 2021-05-18 ENCOUNTER — MYC MEDICAL ADVICE (OUTPATIENT)
Dept: FAMILY MEDICINE | Facility: OTHER | Age: 26
End: 2021-05-18

## 2021-05-24 ENCOUNTER — HOSPITAL ENCOUNTER (EMERGENCY)
Facility: OTHER | Age: 26
Discharge: HOME | End: 2021-05-25
Attending: FAMILY MEDICINE | Admitting: FAMILY MEDICINE
Payer: COMMERCIAL

## 2021-05-24 ENCOUNTER — APPOINTMENT (OUTPATIENT)
Dept: CT IMAGING | Facility: OTHER | Age: 26
End: 2021-05-24
Attending: FAMILY MEDICINE
Payer: COMMERCIAL

## 2021-05-24 DIAGNOSIS — K59.00 CONSTIPATION, UNSPECIFIED CONSTIPATION TYPE: ICD-10-CM

## 2021-05-24 LAB
ALBUMIN SERPL-MCNC: 4.5 G/DL (ref 3.5–5.7)
ALBUMIN UR-MCNC: NEGATIVE MG/DL
ALP SERPL-CCNC: 90 U/L (ref 34–104)
ALT SERPL W P-5'-P-CCNC: 32 U/L (ref 7–52)
ANION GAP SERPL CALCULATED.3IONS-SCNC: 9 MMOL/L (ref 3–14)
APPEARANCE UR: CLEAR
AST SERPL W P-5'-P-CCNC: 15 U/L (ref 13–39)
BASOPHILS # BLD AUTO: 0 10E9/L (ref 0–0.2)
BASOPHILS NFR BLD AUTO: 0.3 %
BILIRUB SERPL-MCNC: 0.7 MG/DL (ref 0.3–1)
BILIRUB UR QL STRIP: NEGATIVE
BUN SERPL-MCNC: 9 MG/DL (ref 7–25)
CALCIUM SERPL-MCNC: 9.4 MG/DL (ref 8.6–10.3)
CHLORIDE SERPL-SCNC: 104 MMOL/L (ref 98–107)
CO2 SERPL-SCNC: 26 MMOL/L (ref 21–31)
COLOR UR AUTO: YELLOW
CREAT SERPL-MCNC: 1.01 MG/DL (ref 0.7–1.3)
DIFFERENTIAL METHOD BLD: ABNORMAL
EOSINOPHIL # BLD AUTO: 0 10E9/L (ref 0–0.7)
EOSINOPHIL NFR BLD AUTO: 0.3 %
ERYTHROCYTE [DISTWIDTH] IN BLOOD BY AUTOMATED COUNT: 13.1 % (ref 10–15)
GFR SERPL CREATININE-BSD FRML MDRD: >90 ML/MIN/{1.73_M2}
GLUCOSE SERPL-MCNC: 104 MG/DL (ref 70–105)
GLUCOSE UR STRIP-MCNC: NEGATIVE MG/DL
HCT VFR BLD AUTO: 50.9 % (ref 40–53)
HGB BLD-MCNC: 17.5 G/DL (ref 13.3–17.7)
HGB UR QL STRIP: NEGATIVE
IMM GRANULOCYTES # BLD: 0.1 10E9/L (ref 0–0.4)
IMM GRANULOCYTES NFR BLD: 0.8 %
KETONES UR STRIP-MCNC: NEGATIVE MG/DL
LEUKOCYTE ESTERASE UR QL STRIP: NEGATIVE
LYMPHOCYTES # BLD AUTO: 2.3 10E9/L (ref 0.8–5.3)
LYMPHOCYTES NFR BLD AUTO: 21.8 %
MCH RBC QN AUTO: 29.5 PG (ref 26.5–33)
MCHC RBC AUTO-ENTMCNC: 34.4 G/DL (ref 31.5–36.5)
MCV RBC AUTO: 86 FL (ref 78–100)
MONOCYTES # BLD AUTO: 0.7 10E9/L (ref 0–1.3)
MONOCYTES NFR BLD AUTO: 6.2 %
NEUTROPHILS # BLD AUTO: 7.4 10E9/L (ref 1.6–8.3)
NEUTROPHILS NFR BLD AUTO: 70.6 %
NITRATE UR QL: NEGATIVE
PH UR STRIP: 7 PH (ref 5–7)
PLATELET # BLD AUTO: 255 10E9/L (ref 150–450)
POTASSIUM SERPL-SCNC: 4 MMOL/L (ref 3.5–5.1)
PROT SERPL-MCNC: 7.2 G/DL (ref 6.4–8.9)
RBC # BLD AUTO: 5.93 10E12/L (ref 4.4–5.9)
SODIUM SERPL-SCNC: 139 MMOL/L (ref 134–144)
SOURCE: NORMAL
SP GR UR STRIP: 1.02 (ref 1–1.03)
UROBILINOGEN UR STRIP-MCNC: NORMAL MG/DL (ref 0–2)
WBC # BLD AUTO: 10.5 10E9/L (ref 4–11)

## 2021-05-24 PROCEDURE — 250N000011 HC RX IP 250 OP 636: Performed by: FAMILY MEDICINE

## 2021-05-24 PROCEDURE — 85025 COMPLETE CBC W/AUTO DIFF WBC: CPT | Performed by: FAMILY MEDICINE

## 2021-05-24 PROCEDURE — 99283 EMERGENCY DEPT VISIT LOW MDM: CPT | Performed by: FAMILY MEDICINE

## 2021-05-24 PROCEDURE — 74176 CT ABD & PELVIS W/O CONTRAST: CPT | Mod: TC

## 2021-05-24 PROCEDURE — 80053 COMPREHEN METABOLIC PANEL: CPT | Performed by: FAMILY MEDICINE

## 2021-05-24 PROCEDURE — 96372 THER/PROPH/DIAG INJ SC/IM: CPT | Performed by: FAMILY MEDICINE

## 2021-05-24 PROCEDURE — 99284 EMERGENCY DEPT VISIT MOD MDM: CPT | Mod: 25 | Performed by: FAMILY MEDICINE

## 2021-05-24 PROCEDURE — 81003 URINALYSIS AUTO W/O SCOPE: CPT | Performed by: FAMILY MEDICINE

## 2021-05-24 PROCEDURE — 99284 EMERGENCY DEPT VISIT MOD MDM: CPT | Performed by: FAMILY MEDICINE

## 2021-05-24 PROCEDURE — 36415 COLL VENOUS BLD VENIPUNCTURE: CPT | Performed by: FAMILY MEDICINE

## 2021-05-24 RX ORDER — KETOROLAC TROMETHAMINE 30 MG/ML
60 INJECTION, SOLUTION INTRAMUSCULAR; INTRAVENOUS ONCE
Status: COMPLETED | OUTPATIENT
Start: 2021-05-24 | End: 2021-05-24

## 2021-05-24 RX ORDER — OXYCODONE HYDROCHLORIDE 5 MG/1
10 TABLET ORAL ONCE
Status: COMPLETED | OUTPATIENT
Start: 2021-05-25 | End: 2021-05-25

## 2021-05-24 RX ORDER — ERYTHROMYCIN AND BENZOYL PEROXIDE 30; 50 MG/G; MG/G
GEL TOPICAL
COMMUNITY
Start: 2021-01-29 | End: 2021-05-26

## 2021-05-24 RX ADMIN — KETOROLAC TROMETHAMINE 60 MG: 30 INJECTION, SOLUTION INTRAMUSCULAR at 22:57

## 2021-05-24 ASSESSMENT — MIFFLIN-ST. JEOR: SCORE: 1912.05

## 2021-05-25 VITALS
RESPIRATION RATE: 18 BRPM | DIASTOLIC BLOOD PRESSURE: 86 MMHG | BODY MASS INDEX: 31.83 KG/M2 | HEART RATE: 82 BPM | OXYGEN SATURATION: 98 % | TEMPERATURE: 98 F | HEIGHT: 68 IN | WEIGHT: 210 LBS | SYSTOLIC BLOOD PRESSURE: 133 MMHG

## 2021-05-25 PROCEDURE — 250N000013 HC RX MED GY IP 250 OP 250 PS 637: Performed by: FAMILY MEDICINE

## 2021-05-25 RX ADMIN — OXYCODONE HYDROCHLORIDE 10 MG: 5 TABLET ORAL at 00:11

## 2021-05-25 ASSESSMENT — ENCOUNTER SYMPTOMS
FEVER: 0
DYSURIA: 0
CHILLS: 0
DIFFICULTY URINATING: 1
FLANK PAIN: 1
HEMATURIA: 0
ABDOMINAL PAIN: 0

## 2021-05-25 NOTE — ED TRIAGE NOTES
Pt to ER from home by self with increased pain to left flank and nausea.  Started yesterday, progressively gotten worse. Slow urine steam.  Hx stones.

## 2021-05-25 NOTE — DISCHARGE INSTRUCTIONS
Push fluids.   Take one dulcolax tonight. Repeat in am if no results. Take one dose of Miralax tonight.

## 2021-05-25 NOTE — ED PROVIDER NOTES
History     Chief Complaint   Patient presents with     Flank Pain     HPI  Hi Kinney is a 25 year old male with a history of kidney stones who presents the emergency room with worsening left lower flank pain that is consistent with his previous history.  He states that he is taking potassium citrate to help avoid further stones.  He has had a history of very large stones in the past and he is concerned that he is developing another one.  He has not had any fever or chills.  He has some pain with trying to urinate.  The pain feels like it is difficult to pee.    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     Priority: Medium     Calculus of kidney 04/27/2019     Priority: Medium        Past Medical History:    Past Medical History:   Diagnosis Date     Calculus of kidney 4/27/2019       Past Surgical History:    Past Surgical History:   Procedure Laterality Date     COMBINED CYSTOSCOPY, RETROGRADES, URETEROSCOPY, LASER HOLMIUM LITHOTRIPSY URETER(S), INSERT STENT Right 8/31/2020    Procedure: Right ureteroscopy with holmium laser lithotripsy and stent placement;  Surgeon: Veto Peralta MD;  Location: [facility]     COMBINED CYSTOSCOPY, URETEROSCOPY, LASER HOLMIUM LITHOTRIPSY URETER(S) Left 3/17/2019    Procedure: COMBINED CYSTOSCOPY, URETEROSCOPY, LASER HOLMIUM LITHOTRIPSY URETER(S)and stent placement;  Surgeon: Veto Peralta MD;  Location:  OR      "COMBINED CYSTOSCOPY, URETEROSCOPY, LASER HOLMIUM LITHOTRIPSY URETER(S) Left 11/17/2019    Procedure: COMBINED CYSTOSCOPY, URETEROSCOPY, LASER HOLMIUM LITHOTRIPSY URETER(S);  Surgeon: Veto Peralta MD;  Location:  OR     ESOPHAGOSCOPY, GASTROSCOPY, DUODENOSCOPY (EGD), COMBINED N/A 11/22/2019    Procedure: ESOPHAGOGASTRODUODENOSCOPY, WITH BIOPSY;  Surgeon: Arnie García MD;  Location:  OR     GRAFT SKIN FULL THICKNESS FROM EXTREMITY Right     right calf     LAPAROSCOPIC CHOLECYSTECTOMY N/A 11/30/2020    Procedure: CHOLECYSTECTOMY, LAPAROSCOPIC;  Surgeon: Arnie García MD;  Location:  OR     TONSILLECTOMY      No Comments Provided       Family History:    History reviewed. No pertinent family history.    Social History:  Marital Status:  Single [1]  Social History     Tobacco Use     Smoking status: Never Smoker     Smokeless tobacco: Never Used   Substance Use Topics     Alcohol use: Yes     Frequency: 2-4 times a month     Comment: rare     Drug use: No        Medications:    potassium citrate 15 MEQ (1620 MG) TBCR  benzoyl peroxide-erythromycin (BENZAMYCIN) 5-3 % external gel  clindamycin-benzoyl peroxide (BENZACLIN) 1-5 % external gel          Review of Systems   Constitutional: Negative for chills and fever.   Gastrointestinal: Negative for abdominal pain.   Genitourinary: Positive for difficulty urinating and flank pain. Negative for dysuria and hematuria.       Physical Exam   BP: 136/83  Pulse: 105  Temp: 97.6  F (36.4  C)  Resp: 18  Height: 172.7 cm (5' 8\")  Weight: 95.3 kg (210 lb)  SpO2: 99 %      Physical Exam  Vitals signs and nursing note reviewed.   Constitutional:       Appearance: Normal appearance.   HENT:      Head: Normocephalic and atraumatic.   Neck:      Musculoskeletal: Neck supple.   Cardiovascular:      Rate and Rhythm: Normal rate and regular rhythm.      Heart sounds: Normal heart sounds.   Pulmonary:      Effort: Pulmonary effort is normal. No respiratory distress.      Breath " sounds: Normal breath sounds. No wheezing.   Abdominal:      General: Abdomen is flat. Bowel sounds are normal.      Palpations: Abdomen is soft.      Tenderness: There is left CVA tenderness.   Neurological:      Mental Status: He is alert.         ED Course   Patient seen and examined.  Labs had already been done prior to my seeing him and those were reviewed and no obvious abnormalities were seen.  There is no blood in his urine be stated that that is been the case in the past as well.  We will get a CT abdomen pelvis to evaluate for stones.    CTA abdomen pelvis did not show any obvious stones.  Discussed with patient that he does appear to have a fair amount of stool in the rectosigmoid area which would be consistent with his pain.  He was comfortable with starting MiraLAX and Dulcolax when he gets home as he has some.  He will follow-up with his urologist as he needs to for further evaluation.  Answered all question the best my ability.     Procedures      Results for orders placed or performed during the hospital encounter of 05/24/21 (from the past 24 hour(s))   UA reflex to Microscopic and Culture    Specimen: Urine clean catch; Midstream Urine   Result Value Ref Range    Color Urine Yellow     Appearance Urine Clear     Glucose Urine Negative NEG^Negative mg/dL    Bilirubin Urine Negative NEG^Negative    Ketones Urine Negative NEG^Negative mg/dL    Specific Gravity Urine 1.022 1.003 - 1.035    Blood Urine Negative NEG^Negative    pH Urine 7.0 5.0 - 7.0 pH    Protein Albumin Urine Negative NEG^Negative mg/dL    Urobilinogen mg/dL Normal 0.0 - 2.0 mg/dL    Nitrite Urine Negative NEG^Negative    Leukocyte Esterase Urine Negative NEG^Negative    Source Midstream Urine    CBC with platelets differential   Result Value Ref Range    WBC 10.5 4.0 - 11.0 10e9/L    RBC Count 5.93 (H) 4.4 - 5.9 10e12/L    Hemoglobin 17.5 13.3 - 17.7 g/dL    Hematocrit 50.9 40.0 - 53.0 %    MCV 86 78 - 100 fl    MCH 29.5 26.5 - 33.0  pg    MCHC 34.4 31.5 - 36.5 g/dL    RDW 13.1 10.0 - 15.0 %    Platelet Count 255 150 - 450 10e9/L    Diff Method Automated Method     % Neutrophils 70.6 %    % Lymphocytes 21.8 %    % Monocytes 6.2 %    % Eosinophils 0.3 %    % Basophils 0.3 %    % Immature Granulocytes 0.8 %    Absolute Neutrophil 7.4 1.6 - 8.3 10e9/L    Absolute Lymphocytes 2.3 0.8 - 5.3 10e9/L    Absolute Monocytes 0.7 0.0 - 1.3 10e9/L    Absolute Eosinophils 0.0 0.0 - 0.7 10e9/L    Absolute Basophils 0.0 0.0 - 0.2 10e9/L    Abs Immature Granulocytes 0.1 0 - 0.4 10e9/L   Comprehensive metabolic panel   Result Value Ref Range    Sodium 139 134 - 144 mmol/L    Potassium 4.0 3.5 - 5.1 mmol/L    Chloride 104 98 - 107 mmol/L    Carbon Dioxide 26 21 - 31 mmol/L    Anion Gap 9 3 - 14 mmol/L    Glucose 104 70 - 105 mg/dL    Urea Nitrogen 9 7 - 25 mg/dL    Creatinine 1.01 0.70 - 1.30 mg/dL    GFR Estimate >90 >60 mL/min/[1.73_m2]    GFR Estimate If Black >90 >60 mL/min/[1.73_m2]    Calcium 9.4 8.6 - 10.3 mg/dL    Bilirubin Total 0.7 0.3 - 1.0 mg/dL    Albumin 4.5 3.5 - 5.7 g/dL    Protein Total 7.2 6.4 - 8.9 g/dL    Alkaline Phosphatase 90 34 - 104 U/L    ALT 32 7 - 52 U/L    AST 15 13 - 39 U/L   CT Abdomen Pelvis w/o Contrast    Narrative    PROCEDURE INFORMATION:   Exam: CT Abdomen And Pelvis Without Contrast   Exam date and time: 5/24/2021 11:34 PM   Age: 25 years old   Clinical indication: Pain; Other: Left flank; Additional info: History of   kidney stones     TECHNIQUE:   Imaging protocol: Computed tomography of the abdomen and pelvis without   contrast.   Radiation optimization: All CT scans at this facility use at least one of these   dose optimization techniques: automated exposure control; mA and/or kV   adjustment per patient size (includes targeted exams where dose is matched to   clinical indication); or iterative reconstruction.     COMPARISON:   CT ABDOMEN PELVIS W/O CONTRAST 8/31/2020 5:08 AM     FINDINGS:   Pleural spaces: Screening  images of the lung bases demonstrate no evidence of   focal consolidation, pneumothorax, or pleural effusion.     Liver: Normal. No mass.   Gallbladder and bile ducts: Surgical clips are seen the gallbladder fossa   compatible with cholecystectomy.   Pancreas: Normal. No ductal dilation.   Spleen: Normal. No splenomegaly.   Adrenal glands: Normal. No mass.   Kidneys and ureters: Normal. No hydronephrosis.   Stomach and bowel: He there is a large amount of stool in the rectosigmoid   colon. There is no evidence of bowel obstruction or bowel dilatation.   Appendix: No evidence of appendicitis.     Intraperitoneal space: Unremarkable. No free air. No significant fluid   collection.   Vasculature: Unremarkable. No abdominal aortic aneurysm.   Lymph nodes: Unremarkable. No enlarged lymph nodes.   Urinary bladder: Unremarkable as visualized.   Reproductive: Unremarkable as visualized.   Bones/joints: Unremarkable. No acute fracture.   Soft tissues: Unremarkable.       Impression    IMPRESSION:   1. No CT evidence of acute intra-abdominal or pelvic process.   2. Status post cholecystectomy.   3. No CT evidence of nephrolithiasis or obstructive uropathy.   4. Normal appearing appendix in the right lower quadrant.   5. Large amount of stool in the rectosigmoid colon.     THIS DOCUMENT HAS BEEN ELECTRONICALLY SIGNED BY LINDY GE DO       Medications   ketorolac (TORADOL) injection 60 mg (60 mg Intramuscular Given 5/24/21 6041)   oxyCODONE (ROXICODONE) tablet 10 mg (10 mg Oral Given 5/25/21 0011)       Assessments & Plan (with Medical Decision Making)     I have reviewed the nursing notes.    I have reviewed the findings, diagnosis, plan and need for follow up with the patient.    New Prescriptions    No medications on file       Final diagnoses:   Constipation, unspecified constipation type       5/24/2021   [facility] AND [facility]     Funmilayo Rebolledo MD  05/25/21 0031

## 2021-05-26 ENCOUNTER — OFFICE VISIT (OUTPATIENT)
Dept: FAMILY MEDICINE | Facility: OTHER | Age: 26
End: 2021-05-26
Attending: FAMILY MEDICINE
Payer: COMMERCIAL

## 2021-05-26 VITALS
WEIGHT: 204 LBS | RESPIRATION RATE: 16 BRPM | SYSTOLIC BLOOD PRESSURE: 110 MMHG | HEART RATE: 76 BPM | HEIGHT: 68 IN | DIASTOLIC BLOOD PRESSURE: 64 MMHG | BODY MASS INDEX: 30.92 KG/M2 | OXYGEN SATURATION: 98 % | TEMPERATURE: 97.3 F

## 2021-05-26 DIAGNOSIS — F51.01 PRIMARY INSOMNIA: Primary | ICD-10-CM

## 2021-05-26 DIAGNOSIS — F41.9 ANXIETY: ICD-10-CM

## 2021-05-26 PROCEDURE — 99213 OFFICE O/P EST LOW 20 MIN: CPT | Performed by: FAMILY MEDICINE

## 2021-05-26 RX ORDER — ZOLPIDEM TARTRATE 10 MG/1
10 TABLET ORAL
Qty: 30 TABLET | Refills: 0 | Status: SHIPPED | OUTPATIENT
Start: 2021-05-26 | End: 2021-12-06

## 2021-05-26 ASSESSMENT — ANXIETY QUESTIONNAIRES
3. WORRYING TOO MUCH ABOUT DIFFERENT THINGS: NOT AT ALL
7. FEELING AFRAID AS IF SOMETHING AWFUL MIGHT HAPPEN: NOT AT ALL
2. NOT BEING ABLE TO STOP OR CONTROL WORRYING: NOT AT ALL
5. BEING SO RESTLESS THAT IT IS HARD TO SIT STILL: NOT AT ALL
IF YOU CHECKED OFF ANY PROBLEMS ON THIS QUESTIONNAIRE, HOW DIFFICULT HAVE THESE PROBLEMS MADE IT FOR YOU TO DO YOUR WORK, TAKE CARE OF THINGS AT HOME, OR GET ALONG WITH OTHER PEOPLE: NOT DIFFICULT AT ALL
1. FEELING NERVOUS, ANXIOUS, OR ON EDGE: NOT AT ALL
GAD7 TOTAL SCORE: 0
6. BECOMING EASILY ANNOYED OR IRRITABLE: NOT AT ALL

## 2021-05-26 ASSESSMENT — PATIENT HEALTH QUESTIONNAIRE - PHQ9
SUM OF ALL RESPONSES TO PHQ QUESTIONS 1-9: 0
5. POOR APPETITE OR OVEREATING: NOT AT ALL

## 2021-05-26 ASSESSMENT — PAIN SCALES - GENERAL: PAINLEVEL: NO PAIN (0)

## 2021-05-26 ASSESSMENT — MIFFLIN-ST. JEOR: SCORE: 1884.84

## 2021-05-26 NOTE — NURSING NOTE
"Patient presents to the clinic for discussion about Ambien medication.      Chief Complaint   Patient presents with     Recheck Medication       Initial /64 (BP Location: Right arm, Patient Position: Sitting, Cuff Size: Adult Regular)   Pulse 76   Temp 97.3  F (36.3  C) (Temporal)   Resp 16   Ht 1.727 m (5' 8\")   Wt 92.5 kg (204 lb)   SpO2 98%   BMI 31.02 kg/m   Estimated body mass index is 31.02 kg/m  as calculated from the following:    Height as of this encounter: 1.727 m (5' 8\").    Weight as of this encounter: 92.5 kg (204 lb).  Medication Reconciliation: complete    Leti Sethi LPN    "

## 2021-05-26 NOTE — PROGRESS NOTES
"Nursing Notes:   Leti Sethi LPN  5/26/2021  2:49 PM  Sign at exiting of workspace  Patient presents to the clinic for discussion about Ambien medication.      Chief Complaint   Patient presents with     Recheck Medication       Initial /64 (BP Location: Right arm, Patient Position: Sitting, Cuff Size: Adult Regular)   Pulse 76   Temp 97.3  F (36.3  C) (Temporal)   Resp 16   Ht 1.727 m (5' 8\")   Wt 92.5 kg (204 lb)   SpO2 98%   BMI 31.02 kg/m   Estimated body mass index is 31.02 kg/m  as calculated from the following:    Height as of this encounter: 1.727 m (5' 8\").    Weight as of this encounter: 92.5 kg (204 lb).  Medication Reconciliation: complete    Leti Sethi LPN         Assessment & Plan       ICD-10-CM    1. Primary insomnia  F51.01 zolpidem (AMBIEN) 10 MG tablet   2. Anxiety  F41.9 FLUoxetine (PROZAC) 20 MG capsule     We discussed how anxiety can commonly cause night awakenings.   Recommend starting treatment for anxiety  Sertraline can be activating, so this could explain insomnia  Paroxetine is not activating, but has a short half life and may cause side effects with variable work schedule  Fluoxetine seems like the best option. Also consider venlafaxine if fluoxetine not tolerated  Start fluoxetine 20 mg daily    Refilled Ambien. He has been using sparingly, but it would be reasonable to use on a consistent, even every night basis to set a sleep routine while waiting for fluoxetine to start helping.    Follow up by 1 month      Enoch Marrufo MD     [facility]      SUBJECTIVE:  25 year old male presents for insomnia    Tried trazodone and melatonin in combination with Ambien without much success  Able to fall asleep, but waking up a couple hours later  He is getting increasingly frustrated by lack of sleep and it is impacting his daily activities. Starting to have trouble with motivation due to tiredness  Has been using 10 mg of Ambien at a time, but on a " "sporadic basis to avoid any consistent use or overuse    He works rotating day and night shifts, but will start 6 weeks of days to try and help with insomnia    Given sertraline to help with anxiety  Sertraline caused nausea, lack of appetite, and worse sleep      REVIEW OF SYSTEMS:    Pertinent items are noted in HPI.    Current Outpatient Medications   Medication Sig Dispense Refill     potassium citrate 15 MEQ (1620 MG) TBCR Take 2 tablets by mouth 2 times daily (with meals) 360 tablet 3     Allergies   Allergen Reactions     Augmentin Rash     Childhood reaction     Cefprozil Rash     Childhood Rxn       OBJECTIVE:  /64 (BP Location: Right arm, Patient Position: Sitting, Cuff Size: Adult Regular)   Pulse 76   Temp 97.3  F (36.3  C) (Temporal)   Resp 16   Ht 1.727 m (5' 8\")   Wt 92.5 kg (204 lb)   SpO2 98%   BMI 31.02 kg/m      EXAM:  General Appearance: Alert. No acute distress  Psychiatric: Normal affect and mentation         "

## 2021-05-27 ASSESSMENT — ANXIETY QUESTIONNAIRES: GAD7 TOTAL SCORE: 0

## 2021-06-14 ENCOUNTER — OFFICE VISIT (OUTPATIENT)
Dept: UROLOGY | Facility: OTHER | Age: 26
End: 2021-06-14
Attending: UROLOGY
Payer: COMMERCIAL

## 2021-06-14 VITALS
DIASTOLIC BLOOD PRESSURE: 80 MMHG | WEIGHT: 205 LBS | SYSTOLIC BLOOD PRESSURE: 112 MMHG | BODY MASS INDEX: 31.17 KG/M2 | HEART RATE: 90 BPM | RESPIRATION RATE: 16 BRPM

## 2021-06-14 DIAGNOSIS — N20.0 KIDNEY STONES: ICD-10-CM

## 2021-06-14 DIAGNOSIS — R82.991 HYPOCITRATURIA: ICD-10-CM

## 2021-06-14 LAB
ANION GAP SERPL CALCULATED.3IONS-SCNC: 9 MMOL/L (ref 3–14)
BUN SERPL-MCNC: 9 MG/DL (ref 7–25)
CALCIUM SERPL-MCNC: 9.8 MG/DL (ref 8.6–10.3)
CHLORIDE SERPL-SCNC: 104 MMOL/L (ref 98–107)
CO2 SERPL-SCNC: 25 MMOL/L (ref 21–31)
CREAT SERPL-MCNC: 0.98 MG/DL (ref 0.7–1.3)
GFR SERPL CREATININE-BSD FRML MDRD: >90 ML/MIN/{1.73_M2}
GLUCOSE SERPL-MCNC: 102 MG/DL (ref 70–105)
POTASSIUM SERPL-SCNC: 4 MMOL/L (ref 3.5–5.1)
SODIUM SERPL-SCNC: 138 MMOL/L (ref 134–144)

## 2021-06-14 PROCEDURE — 80048 BASIC METABOLIC PNL TOTAL CA: CPT | Mod: ZL | Performed by: UROLOGY

## 2021-06-14 PROCEDURE — 99213 OFFICE O/P EST LOW 20 MIN: CPT | Performed by: UROLOGY

## 2021-06-14 PROCEDURE — 36415 COLL VENOUS BLD VENIPUNCTURE: CPT | Mod: ZL | Performed by: UROLOGY

## 2021-06-14 RX ORDER — POTASSIUM CITRATE 15 MEQ/1
2 TABLET, EXTENDED RELEASE ORAL 2 TIMES DAILY WITH MEALS
Qty: 360 TABLET | Refills: 3 | Status: SHIPPED | OUTPATIENT
Start: 2021-06-14

## 2021-06-14 ASSESSMENT — PAIN SCALES - GENERAL: PAINLEVEL: NO PAIN (0)

## 2021-06-14 NOTE — PROGRESS NOTES
Type of Visit  Established    Chief Complaint  History of kidney stones  Hypocitraturia    HPI  Mr. Kinney is a 26 year old male with history of kidney stones for the last 16 years.  The patient underwent a metabolic work-up revealing hypocitraturia.  The patient has been taking 30 mEq of potassium citrate as split dose 15 MDQ twice daily.  Previously he was experiencing issues with GI upset.  Now that he has been on medication chronically he is tolerating the medication well.  He was in the emergency department one time in the last 6 months with what he thought was left-sided renal colic.  CT scan at that time was negative other than significant constipation.  No symptoms today.      Review of Systems  I reviewed the ROS with the patient.    Nursing Notes:   Rosalinda Read LPN  6/14/2021  8:27 AM  Addendum  Pt presents to clinic for a six month kidney stone follow up.  States that he is currently having flank pain    Review of Systems:    Weight loss:    No     Recent fever/chills:  No   Night sweats:   No  Current skin rash:  No   Recent hair loss:  No  Heat intolerance:  No   Cold intolerance:  No  Chest pain:   No   Palpitations:   No  Shortness of breath:  No   Wheezing:   No  Constipation:    No   Diarrhea:   No   Nausea:   No   Vomiting:   No   Kidney/side pain:  Yes   Back pain:   Yes  Frequent headaches:  No   Dizziness:     No  Leg swelling:   No   Calf pain:    No        Physical Exam  /80 (BP Location: Left arm, Patient Position: Sitting, Cuff Size: Adult Large)   Pulse 90   Resp 16   Wt 93 kg (205 lb)   BMI 31.17 kg/m    Constitutional: NAD, WDWN.  Cardiovascular: Regular rate.  Pulmonary/Chest: Respirations are even and non-labored bilaterally.  Abdominal: Soft. No distension, tenderness, masses or guarding.  Back: - left CVA tenderness,  - right CVA tenderness.   Extremities: ADELA x 4, Warm. No clubbing.  No cyanosis.    Skin: Pink, warm and dry.  No rashes noted.    Labs  Results for  TAMMY WALKER (MRN [id number]) as of 6/14/2021 08:31   5/24/2021 21:25 5/24/2021 22:02   Sodium  139   Potassium  4.0   Chloride  104   Carbon Dioxide  26   Urea Nitrogen  9   Creatinine  1.01   GFR Estimate  >90   GFR Estimate If Black  >90   Calcium  9.4   Anion Gap  9   Albumin  4.5   Protein Total  7.2   Bilirubin Total  0.7   Alkaline Phosphatase  90   ALT  32   AST  15   Glucose  104   WBC  10.5   Hemoglobin  17.5   Hematocrit  50.9   Platelet Count  255   RBC Count  5.93 (H)   MCV  86   MCH  29.5   MCHC  34.4   RDW  13.1   Diff Method  Automated Method   % Neutrophils  70.6   % Lymphocytes  21.8   % Monocytes  6.2   % Eosinophils  0.3   % Basophils  0.3   % Immature Granulocytes  0.8   Absolute Neutrophil  7.4   Absolute Lymphocytes  2.3   Absolute Monocytes  0.7   Absolute Eosinophils  0.0   Absolute Basophils  0.0   Abs Immature Granulocytes  0.1   Color Urine Yellow    Appearance Urine Clear    Glucose Urine Negative    Bilirubin Urine Negative    Ketones Urine Negative    Specific Gravity Urine 1.022    pH Urine 7.0    Protein Albumin Urine Negative    Urobilinogen mg/dL Normal    Nitrite Urine Negative    Blood Urine Negative    Leukocyte Esterase Urine Negative    Source Midstream Urine      Results for TAMMY WALKER ADRIAN (MRN [id number]) as of 5/15/2019 09:47   3/25/2019 08:46   Magnesium 1.9   Phosphorus 1.8 (L)   Parathyroid Hormone Intact 16     Stone Analysis  3/17/2019  20% calcium oxalate  80% calcium phosphate     [employer]     4/28/2019 & 4/29/2019  Volume (L)  2.1-4.5   SS CaOx  2.27-5.4 (6-10)  Urine Calcium  203-213 (male<250)  Urine Oxalate  32-34  (20-40)  Urine Citrate  238-362 (male>450)  SS CaP  0.8-1.4  (0.5-2)  24 hr Urine pH  6.4-6.8  (5.8-6.2)  SS Uric Acid  0.07-0.31 (0-1)  Urine uric acid  0.76-0.777 (<0.8)    (collected while on treatment:  None)    Na: 107-206 and Cl:     Radiographic Studies  I personally reviewed and interpreted the images and report.  CT  Stone  5/24/2021  IMPRESSION:   1. No CT evidence of acute intra-abdominal or pelvic process.   2. Status post cholecystectomy.   3. No CT evidence of nephrolithiasis or obstructive uropathy.   4. Normal appearing appendix in the right lower quadrant.   5. Large amount of stool in the rectosigmoid colon.     ^^^^^^^^^^^^^^^^^^^^^^^^^^^^^^^^^^^^^^^^    CT Stone  4/26/2019  IMPRESSION:   1. No acute osseous pelvic CT findings.   2. Stable mild fullness of the right renal collecting system. Is there chronic in nature.   3. 1 mm nonobstructing left renal calculus.    Assessment  Mr. Kinney is a 26 year old male with history of kidney stones with hypocitraturia who recently started medical management with potassium citrate who follows up with a BMP.  He is tolerating this regimen well and is able to continue it on a regular basis.    Clinically it appears the patient may have distal renal tubular acidosis.  I described the pathophysiology of this disease state and explained how it leads to recurrent stones.    Reviewed the recent CT scan -completely clear -no stones.  Reviewed the most recent labs    Plan  Continue KCitrate 15 mEq - take 2 tablets (30 mEq) BID  Follow up in 6 months with a BMP then once annually thereafter

## 2021-06-14 NOTE — NURSING NOTE
Pt presents to clinic for a six month kidney stone follow up.  States that he is currently having flank pain    Review of Systems:    Weight loss:    No     Recent fever/chills:  No   Night sweats:   No  Current skin rash:  No   Recent hair loss:  No  Heat intolerance:  No   Cold intolerance:  No  Chest pain:   No   Palpitations:   No  Shortness of breath:  No   Wheezing:   No  Constipation:    No   Diarrhea:   No   Nausea:   No   Vomiting:   No   Kidney/side pain:  Yes   Back pain:   Yes  Frequent headaches:  No   Dizziness:     No  Leg swelling:   No   Calf pain:    No

## 2021-06-15 ENCOUNTER — APPOINTMENT (OUTPATIENT)
Dept: CT IMAGING | Facility: OTHER | Age: 26
End: 2021-06-15
Attending: PHYSICIAN ASSISTANT
Payer: COMMERCIAL

## 2021-06-15 ENCOUNTER — OFFICE VISIT (OUTPATIENT)
Dept: FAMILY MEDICINE | Facility: OTHER | Age: 26
End: 2021-06-15
Attending: NURSE PRACTITIONER
Payer: OTHER MISCELLANEOUS

## 2021-06-15 ENCOUNTER — HOSPITAL ENCOUNTER (OUTPATIENT)
Dept: GENERAL RADIOLOGY | Facility: OTHER | Age: 26
End: 2021-06-15
Attending: NURSE PRACTITIONER
Payer: OTHER MISCELLANEOUS

## 2021-06-15 ENCOUNTER — HOSPITAL ENCOUNTER (EMERGENCY)
Facility: OTHER | Age: 26
Discharge: HOME | End: 2021-06-15
Attending: PHYSICIAN ASSISTANT | Admitting: PHYSICIAN ASSISTANT
Payer: COMMERCIAL

## 2021-06-15 VITALS
SYSTOLIC BLOOD PRESSURE: 158 MMHG | BODY MASS INDEX: 29.95 KG/M2 | OXYGEN SATURATION: 95 % | DIASTOLIC BLOOD PRESSURE: 79 MMHG | HEART RATE: 102 BPM | RESPIRATION RATE: 18 BRPM | TEMPERATURE: 99.1 F | WEIGHT: 197 LBS

## 2021-06-15 VITALS
SYSTOLIC BLOOD PRESSURE: 122 MMHG | BODY MASS INDEX: 29.86 KG/M2 | HEIGHT: 68 IN | OXYGEN SATURATION: 97 % | HEART RATE: 82 BPM | WEIGHT: 197 LBS | TEMPERATURE: 97 F | DIASTOLIC BLOOD PRESSURE: 78 MMHG | RESPIRATION RATE: 17 BRPM

## 2021-06-15 DIAGNOSIS — M25.562 ACUTE PAIN OF LEFT KNEE: Primary | ICD-10-CM

## 2021-06-15 DIAGNOSIS — R11.0 NAUSEA: ICD-10-CM

## 2021-06-15 DIAGNOSIS — M25.562 ACUTE PAIN OF LEFT KNEE: ICD-10-CM

## 2021-06-15 DIAGNOSIS — K29.70 VIRAL GASTRITIS: ICD-10-CM

## 2021-06-15 LAB
ALBUMIN SERPL-MCNC: 4.4 G/DL (ref 3.5–5.7)
ALBUMIN UR-MCNC: 30 MG/DL
ALP SERPL-CCNC: 80 U/L (ref 34–104)
ALT SERPL W P-5'-P-CCNC: 36 U/L (ref 7–52)
ANION GAP SERPL CALCULATED.3IONS-SCNC: 14 MMOL/L (ref 3–14)
APPEARANCE UR: CLEAR
AST SERPL W P-5'-P-CCNC: 15 U/L (ref 13–39)
BASOPHILS # BLD AUTO: 0 10E9/L (ref 0–0.2)
BASOPHILS NFR BLD AUTO: 0.3 %
BILIRUB SERPL-MCNC: 0.7 MG/DL (ref 0.3–1)
BILIRUB UR QL STRIP: NEGATIVE
BUN SERPL-MCNC: 11 MG/DL (ref 7–25)
CALCIUM SERPL-MCNC: 9 MG/DL (ref 8.6–10.3)
CHLORIDE SERPL-SCNC: 104 MMOL/L (ref 98–107)
CO2 SERPL-SCNC: 20 MMOL/L (ref 21–31)
COLOR UR AUTO: YELLOW
CREAT SERPL-MCNC: 0.91 MG/DL (ref 0.7–1.3)
CRP SERPL-MCNC: 4.4 MG/L
DIFFERENTIAL METHOD BLD: NORMAL
EOSINOPHIL # BLD AUTO: 0.1 10E9/L (ref 0–0.7)
EOSINOPHIL NFR BLD AUTO: 0.5 %
ERYTHROCYTE [DISTWIDTH] IN BLOOD BY AUTOMATED COUNT: 13.7 % (ref 10–15)
ERYTHROCYTE [SEDIMENTATION RATE] IN BLOOD BY WESTERGREN METHOD: 2 MM/H (ref 1–10)
GFR SERPL CREATININE-BSD FRML MDRD: ABNORMAL ML/MIN/{1.73_M2}
GLUCOSE SERPL-MCNC: 99 MG/DL (ref 70–105)
GLUCOSE UR STRIP-MCNC: NEGATIVE MG/DL
HCT VFR BLD AUTO: 46.8 % (ref 40–53)
HGB BLD-MCNC: 16.6 G/DL (ref 13.3–17.7)
HGB UR QL STRIP: NEGATIVE
IMM GRANULOCYTES # BLD: 0.1 10E9/L (ref 0–0.4)
IMM GRANULOCYTES NFR BLD: 0.8 %
KETONES UR STRIP-MCNC: 40 MG/DL
LACTATE BLD-SCNC: 0.8 MMOL/L (ref 0.7–2)
LEUKOCYTE ESTERASE UR QL STRIP: NEGATIVE
LYMPHOCYTES # BLD AUTO: 2.3 10E9/L (ref 0.8–5.3)
LYMPHOCYTES NFR BLD AUTO: 22.5 %
MCH RBC QN AUTO: 29.7 PG (ref 26.5–33)
MCHC RBC AUTO-ENTMCNC: 35.5 G/DL (ref 31.5–36.5)
MCV RBC AUTO: 84 FL (ref 78–100)
MONOCYTES # BLD AUTO: 0.8 10E9/L (ref 0–1.3)
MONOCYTES NFR BLD AUTO: 7.5 %
MUCOUS THREADS #/AREA URNS LPF: PRESENT /LPF
NEUTROPHILS # BLD AUTO: 7.1 10E9/L (ref 1.6–8.3)
NEUTROPHILS NFR BLD AUTO: 68.4 %
NITRATE UR QL: NEGATIVE
PH UR STRIP: 6 PH (ref 5–7)
PLATELET # BLD AUTO: 246 10E9/L (ref 150–450)
POTASSIUM SERPL-SCNC: 3.5 MMOL/L (ref 3.5–5.1)
PROT SERPL-MCNC: 6.9 G/DL (ref 6.4–8.9)
RBC # BLD AUTO: 5.58 10E12/L (ref 4.4–5.9)
RBC #/AREA URNS AUTO: <1 /HPF (ref 0–2)
SODIUM SERPL-SCNC: 138 MMOL/L (ref 134–144)
SOURCE: ABNORMAL
SP GR UR STRIP: 1.03 (ref 1–1.03)
UROBILINOGEN UR STRIP-MCNC: NORMAL MG/DL (ref 0–2)
WBC # BLD AUTO: 10.3 10E9/L (ref 4–11)
WBC #/AREA URNS AUTO: 1 /HPF (ref 0–5)

## 2021-06-15 PROCEDURE — 250N000011 HC RX IP 250 OP 636: Performed by: PHYSICIAN ASSISTANT

## 2021-06-15 PROCEDURE — 80053 COMPREHEN METABOLIC PANEL: CPT | Performed by: PHYSICIAN ASSISTANT

## 2021-06-15 PROCEDURE — 258N000003 HC RX IP 258 OP 636: Performed by: PHYSICIAN ASSISTANT

## 2021-06-15 PROCEDURE — 81001 URINALYSIS AUTO W/SCOPE: CPT | Performed by: PHYSICIAN ASSISTANT

## 2021-06-15 PROCEDURE — 74177 CT ABD & PELVIS W/CONTRAST: CPT

## 2021-06-15 PROCEDURE — 86140 C-REACTIVE PROTEIN: CPT | Performed by: PHYSICIAN ASSISTANT

## 2021-06-15 PROCEDURE — 99213 OFFICE O/P EST LOW 20 MIN: CPT | Performed by: NURSE PRACTITIONER

## 2021-06-15 PROCEDURE — 99283 EMERGENCY DEPT VISIT LOW MDM: CPT | Performed by: PHYSICIAN ASSISTANT

## 2021-06-15 PROCEDURE — 36415 COLL VENOUS BLD VENIPUNCTURE: CPT | Performed by: PHYSICIAN ASSISTANT

## 2021-06-15 PROCEDURE — 99285 EMERGENCY DEPT VISIT HI MDM: CPT | Mod: 25 | Performed by: PHYSICIAN ASSISTANT

## 2021-06-15 PROCEDURE — 85652 RBC SED RATE AUTOMATED: CPT | Performed by: PHYSICIAN ASSISTANT

## 2021-06-15 PROCEDURE — 96374 THER/PROPH/DIAG INJ IV PUSH: CPT | Mod: XU | Performed by: PHYSICIAN ASSISTANT

## 2021-06-15 PROCEDURE — 83605 ASSAY OF LACTIC ACID: CPT | Performed by: PHYSICIAN ASSISTANT

## 2021-06-15 PROCEDURE — 85025 COMPLETE CBC W/AUTO DIFF WBC: CPT | Performed by: PHYSICIAN ASSISTANT

## 2021-06-15 PROCEDURE — 73562 X-RAY EXAM OF KNEE 3: CPT | Mod: LT

## 2021-06-15 RX ORDER — ONDANSETRON 4 MG/1
4 TABLET, ORALLY DISINTEGRATING ORAL EVERY 6 HOURS PRN
Qty: 30 TABLET | Refills: 0 | Status: SHIPPED | OUTPATIENT
Start: 2021-06-15 | End: 2022-09-15

## 2021-06-15 RX ORDER — METOCLOPRAMIDE HYDROCHLORIDE 5 MG/ML
10 INJECTION INTRAMUSCULAR; INTRAVENOUS ONCE
Status: COMPLETED | OUTPATIENT
Start: 2021-06-15 | End: 2021-06-15

## 2021-06-15 RX ORDER — IOPAMIDOL 755 MG/ML
100 INJECTION, SOLUTION INTRAVASCULAR ONCE
Status: COMPLETED | OUTPATIENT
Start: 2021-06-15 | End: 2021-06-15

## 2021-06-15 RX ORDER — SODIUM CHLORIDE 9 MG/ML
INJECTION, SOLUTION INTRAVENOUS CONTINUOUS
Status: DISCONTINUED | OUTPATIENT
Start: 2021-06-15 | End: 2021-06-15 | Stop reason: HOSPADM

## 2021-06-15 RX ORDER — METOCLOPRAMIDE 10 MG/1
10 TABLET ORAL 3 TIMES DAILY PRN
Qty: 30 TABLET | Refills: 0 | Status: SHIPPED | OUTPATIENT
Start: 2021-06-15 | End: 2022-09-15

## 2021-06-15 RX ADMIN — METOCLOPRAMIDE HYDROCHLORIDE 10 MG: 5 INJECTION INTRAMUSCULAR; INTRAVENOUS at 20:49

## 2021-06-15 RX ADMIN — SODIUM CHLORIDE 1000 ML: 9 INJECTION, SOLUTION INTRAVENOUS at 19:58

## 2021-06-15 RX ADMIN — IOPAMIDOL 100 ML: 755 INJECTION, SOLUTION INTRAVENOUS at 19:56

## 2021-06-15 ASSESSMENT — ENCOUNTER SYMPTOMS
COLOR CHANGE: 0
ADENOPATHY: 0
CONFUSION: 0
DIFFICULTY URINATING: 0
CHEST TIGHTNESS: 0
FEVER: 0
ARTHRALGIAS: 0
HEADACHES: 0
ABDOMINAL PAIN: 0
HEMATURIA: 0
NECK STIFFNESS: 0
EYE REDNESS: 0
BRUISES/BLEEDS EASILY: 0
CHILLS: 0
BACK PAIN: 0
SHORTNESS OF BREATH: 0
WOUND: 0

## 2021-06-15 ASSESSMENT — MIFFLIN-ST. JEOR: SCORE: 1848.09

## 2021-06-15 ASSESSMENT — PAIN SCALES - GENERAL: PAINLEVEL: NO PAIN (0)

## 2021-06-15 NOTE — PROGRESS NOTES
"HPI:    Hi Kinney is a 26 year old male who presents to clinic today for left knee concerns.  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  He was seen in the emergency room that night for evaluation of left knee pain.  The injury involved being part of a restraint of and under control patient.  He landed on his left knee hard on the floor and had some swelling and pain.  X-ray was completed and he was treated symptomatically.  He reports he continues to have left knee pain and the lump on the front of his knee seems to be worsening.  He is having pain with activities such as kneeling on his knee or whenever his knee gets bumped.    Past Medical History:   Diagnosis Date     Calculus of kidney 4/27/2019         Current Outpatient Medications   Medication Sig Dispense Refill     FLUoxetine (PROZAC) 20 MG capsule Take 1 capsule (20 mg) by mouth daily 30 capsule 0     potassium citrate 15 MEQ (1620 MG) TBCR Take 2 tablets by mouth 2 times daily (with meals) 360 tablet 3     zolpidem (AMBIEN) 10 MG tablet Take 1 tablet (10 mg) by mouth nightly as needed for sleep 30 tablet 0       Allergies   Allergen Reactions     Augmentin Rash     Childhood reaction     Cefprozil Rash     Childhood Rxn       ROS:  Pertinent positives and negatives are noted in HPI.    EXAM:  /78 (BP Location: Right arm, Patient Position: Sitting, Cuff Size: Adult Regular)   Pulse 82   Temp 97  F (36.1  C) (Temporal)   Resp 17   Ht 1.727 m (5' 8\")   Wt 89.4 kg (197 lb)   SpO2 97%   BMI 29.95 kg/m    General appearance: well appearing male, in no acute distress  Musculoskeletal: Left knee with raised nodule measuring approximately 1 and half centimeters in diameter.  This is firm and slightly movable.  It is tender to touch.  Dermatological: no rashes or lesions  Psychological: normal affect, alert and pleasant    ASSESSMENT AND PLAN:    1. Acute pain of left knee      Acute pain " to left knee with some accessory ossicles that are inflamed.  We will continue to treat symptomatically with NSAIDs and ice.  Refer to physical therapy for consideration of ultrasound to try to calm this down.      JASMINE Mott CNP..................6/15/2021 2:17 PM

## 2021-06-15 NOTE — NURSING NOTE
"Chief Complaint   Patient presents with     Mass     below left knee cap     Patient presents to clinic with a mass under left knee cap. He states it has been there for about 2-3 months and is getting bigger. He cannot even kneel on his knee without pain.     Initial /78 (BP Location: Right arm, Patient Position: Sitting, Cuff Size: Adult Regular)   Pulse 82   Temp 97  F (36.1  C) (Temporal)   Resp 17   Ht 1.727 m (5' 8\")   Wt 89.4 kg (197 lb)   SpO2 97%   BMI 29.95 kg/m   Estimated body mass index is 29.95 kg/m  as calculated from the following:    Height as of this encounter: 1.727 m (5' 8\").    Weight as of this encounter: 89.4 kg (197 lb).         Medication Reconciliation: Complete      Emmie Rodríguez   "

## 2021-06-16 NOTE — ED TRIAGE NOTES
ED Nursing Triage Note (General)   ________________________________    Hi ADRIAN Kinney is a 26 year old Male that presents to triage via private vehicle with complaints of R) sided lower abdominal pain since 1400.  Patient states 3 episodes of emesis during this time.  Patient states pain is worse when sitting up straight.  Patient also states loose stools for the past week.   Significant symptoms had onset 5 hours ago.  GCS-15  Airway: intact  Breathing noted as Normal  Action taken:level 3      PRE HOSPITAL PRIOR LIVING SITUATION-home   Admission Reconciliation is Completed  Discharge Reconciliation is Completed

## 2021-06-16 NOTE — ED PROVIDER NOTES
History     Chief Complaint   Patient presents with     Abdominal Pain     HPI  Hi Kinney is a 26 year old male who has been having some abdominal pain that waxes and wanes over the past few weeks.  Tonight he had severe right lower quadrant abdominal pain that started at 1400.  The only food he had eaten was his breakfast.  He had 3 episodes of emesis at this time as well.  He does report that he has a history of renal stones in the past.  He reports the pain is worse when he is sitting straight up.  He also reports has had some loose stools over the past week or so.  He was seen in the emergency room on 5/24/2021 thorough evaluation at that time showed mainly some residual constipation.    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     Priority: Medium     Calculus of kidney 04/27/2019     Priority: Medium        Past Medical History:    Past Medical History:   Diagnosis Date     Calculus of kidney 4/27/2019       Past Surgical History:    Past Surgical History:   Procedure Laterality Date     COMBINED CYSTOSCOPY, RETROGRADES, URETEROSCOPY, LASER HOLMIUM LITHOTRIPSY URETER(S), INSERT STENT Right 8/31/2020    Procedure: Right ureteroscopy with holmium laser lithotripsy and stent placement;  Surgeon: Veto Peralta MD;  Location:  OR     COMBINED CYSTOSCOPY, URETEROSCOPY, LASER HOLMIUM LITHOTRIPSY URETER(S) Left 3/17/2019    Procedure: COMBINED CYSTOSCOPY, URETEROSCOPY, LASER  HOLMIUM LITHOTRIPSY URETER(S)and stent placement;  Surgeon: Veto Peralta MD;  Location:  OR     COMBINED CYSTOSCOPY, URETEROSCOPY, LASER HOLMIUM LITHOTRIPSY URETER(S) Left 11/17/2019    Procedure: COMBINED CYSTOSCOPY, URETEROSCOPY, LASER HOLMIUM LITHOTRIPSY URETER(S);  Surgeon: Veto Peralta MD;  Location:  OR     ESOPHAGOSCOPY, GASTROSCOPY, DUODENOSCOPY (EGD), COMBINED N/A 11/22/2019    Procedure: ESOPHAGOGASTRODUODENOSCOPY, WITH BIOPSY;  Surgeon: Arnie García MD;  Location:  OR     GRAFT SKIN FULL THICKNESS FROM EXTREMITY Right     right calf     LAPAROSCOPIC CHOLECYSTECTOMY N/A 11/30/2020    Procedure: CHOLECYSTECTOMY, LAPAROSCOPIC;  Surgeon: Arnie García MD;  Location:  OR     TONSILLECTOMY      No Comments Provided       Family History:    No family history on file.    Social History:  Marital Status:  Single [1]  Social History     Tobacco Use     Smoking status: Never Smoker     Smokeless tobacco: Never Used   Substance Use Topics     Alcohol use: Yes     Frequency: 2-4 times a month     Comment: rare     Drug use: No        Medications:    FLUoxetine (PROZAC) 20 MG capsule  potassium citrate 15 MEQ (1620 MG) TBCR  zolpidem (AMBIEN) 10 MG tablet          Review of Systems   Constitutional: Negative for chills and fever.   HENT: Negative for congestion.    Eyes: Negative for redness and visual disturbance.   Respiratory: Negative for chest tightness and shortness of breath.    Cardiovascular: Negative for chest pain.   Gastrointestinal: Negative for abdominal pain.   Genitourinary: Negative for difficulty urinating and hematuria.   Musculoskeletal: Negative for arthralgias, back pain and neck stiffness.   Skin: Negative for color change, rash and wound.   Neurological: Negative for syncope and headaches.   Hematological: Negative for adenopathy. Does not bruise/bleed easily.   Psychiatric/Behavioral: Negative for confusion.       Physical Exam   BP: (!) 158/79  Pulse: 102  Temp: 99.1  F  (37.3  C)  Resp: 18  Weight: 89.4 kg (197 lb)  SpO2: 95 %      Physical Exam  Vitals signs and nursing note reviewed.   Constitutional:       General: He is not in acute distress.     Appearance: He is not ill-appearing, toxic-appearing or diaphoretic.   HENT:      Head: Atraumatic.      Right Ear: No drainage or tenderness.      Left Ear: No drainage or tenderness.      Nose: Nose normal. No rhinorrhea.   Eyes:      General: Lids are normal. Gaze aligned appropriately. No scleral icterus.     Extraocular Movements: Extraocular movements intact.      Right eye: Normal extraocular motion and no nystagmus.      Left eye: Normal extraocular motion and no nystagmus.      Pupils: Pupils are equal, round, and reactive to light. Pupils are equal.   Neck:      Musculoskeletal: Full passive range of motion without pain, normal range of motion and neck supple. Normal range of motion. No neck rigidity, injury, pain with movement or muscular tenderness.      Vascular: No JVD.      Trachea: No tracheal deviation.   Cardiovascular:      Rate and Rhythm: Normal rate and regular rhythm.      Heart sounds: Normal heart sounds. No friction rub.   Pulmonary:      Effort: No respiratory distress.      Breath sounds: Normal breath sounds. No stridor.   Chest:      Chest wall: No crepitus.   Abdominal:      General: Bowel sounds are normal. There are no signs of injury.      Palpations: Abdomen is soft.      Tenderness: There is generalized abdominal tenderness and tenderness in the right lower quadrant. There is no guarding or rebound.      Comments: Generalized abdominal pain more pronounced in the right lower quadrant.  No rebound or masses.  Bowel sounds are normal.   Musculoskeletal: Normal range of motion.         General: No tenderness.   Lymphadenopathy:      Cervical: No cervical adenopathy.      Right cervical: No superficial cervical adenopathy.     Left cervical: No superficial cervical adenopathy.   Skin:     General: Skin  is warm.      Capillary Refill: Capillary refill takes less than 2 seconds.      Findings: No rash.   Neurological:      General: No focal deficit present.      Mental Status: He is alert and oriented to person, place, and time.      GCS: GCS eye subscore is 4. GCS verbal subscore is 5. GCS motor subscore is 6.      Motor: No tremor.      Coordination: Coordination normal.      Gait: Gait is intact. Gait normal.   Psychiatric:         Mood and Affect: Mood normal.         Behavior: Behavior normal.         ED Course       Results for orders placed or performed during the hospital encounter of 06/15/21 (from the past 24 hour(s))   CBC with platelets differential   Result Value Ref Range    WBC 10.3 4.0 - 11.0 10e9/L    RBC Count 5.58 4.4 - 5.9 10e12/L    Hemoglobin 16.6 13.3 - 17.7 g/dL    Hematocrit 46.8 40.0 - 53.0 %    MCV 84 78 - 100 fl    MCH 29.7 26.5 - 33.0 pg    MCHC 35.5 31.5 - 36.5 g/dL    RDW 13.7 10.0 - 15.0 %    Platelet Count 246 150 - 450 10e9/L    Diff Method Automated Method     % Neutrophils 68.4 %    % Lymphocytes 22.5 %    % Monocytes 7.5 %    % Eosinophils 0.5 %    % Basophils 0.3 %    % Immature Granulocytes 0.8 %    Absolute Neutrophil 7.1 1.6 - 8.3 10e9/L    Absolute Lymphocytes 2.3 0.8 - 5.3 10e9/L    Absolute Monocytes 0.8 0.0 - 1.3 10e9/L    Absolute Eosinophils 0.1 0.0 - 0.7 10e9/L    Absolute Basophils 0.0 0.0 - 0.2 10e9/L    Abs Immature Granulocytes 0.1 0 - 0.4 10e9/L   Comprehensive metabolic panel   Result Value Ref Range    Sodium 138 134 - 144 mmol/L    Potassium 3.5 3.5 - 5.1 mmol/L    Chloride 104 98 - 107 mmol/L    Carbon Dioxide 20 (L) 21 - 31 mmol/L    Anion Gap 14 3 - 14 mmol/L    Glucose 99 70 - 105 mg/dL    Urea Nitrogen 11 7 - 25 mg/dL    Creatinine 0.91 0.70 - 1.30 mg/dL    GFR Estimate Not Calculated >60 mL/min/[1.73_m2]    GFR Estimate If Black Not Calculated >60 mL/min/[1.73_m2]    Calcium 9.0 8.6 - 10.3 mg/dL    Bilirubin Total 0.7 0.3 - 1.0 mg/dL    Albumin 4.4 3.5 -  5.7 g/dL    Protein Total 6.9 6.4 - 8.9 g/dL    Alkaline Phosphatase 80 34 - 104 U/L    ALT 36 7 - 52 U/L    AST 15 13 - 39 U/L   Lactic acid whole blood   Result Value Ref Range    Lactic Acid 0.8 0.7 - 2.0 mmol/L   CRP inflammation   Result Value Ref Range    CRP Inflammation 4.4 <10.0 mg/L   Erythrocyte sedimentation rate auto   Result Value Ref Range    Sed Rate 2 1 - 10 mm/h   UA reflex to Microscopic   Result Value Ref Range    Color Urine Yellow     Appearance Urine Clear     Glucose Urine Negative NEG^Negative mg/dL    Bilirubin Urine Negative NEG^Negative    Ketones Urine 40 (A) NEG^Negative mg/dL    Specific Gravity Urine 1.030 1.003 - 1.035    Blood Urine Negative NEG^Negative    pH Urine 6.0 5.0 - 7.0 pH    Protein Albumin Urine 30 (A) NEG^Negative mg/dL    Urobilinogen mg/dL Normal 0.0 - 2.0 mg/dL    Nitrite Urine Negative NEG^Negative    Leukocyte Esterase Urine Negative NEG^Negative    Source Midstream Urine     RBC Urine <1 0 - 2 /HPF    WBC Urine 1 0 - 5 /HPF    Mucous Urine Present (A) NEG^Negative /LPF   CT Abdomen Pelvis w Contrast    Narrative    PROCEDURE:  CT ABDOMEN PELVIS W CONTRAST    HISTORY: RLQ abdominal pain, appendicitis suspected (Age >= 14y);  Abdominal pain, acute, nonlocalized    TECHNIQUE:  Helical CT of the abdomen and pelvis was performed  following injection of intravenous contrast.      COMPARISON:  5/24/2021    MEDS/CONTRAST: 100 mL Isovue 370    FINDINGS:      Limited images through the lung bases demonstrate no focal  consolidation or mass.  The heart size is normal. No pericardial or  pleural effusions are seen.    The gallbladder is surgically absent. The liver, spleen, pancreas and  adrenal glands are and a bone appearance. Subcentimeter renal cortical  hypodensities on the right are nonspecific. Symmetric nephrograms are  present without hydronephrosis. There is no abdominal aortic aneurysm.    The bowel is normal in caliber. The appendix is normal. A  prominent  submucosal fat in the distal colon may be accentuated by  underdistention.    No free fluid, free air or adenopathy is present.  No suspicious  osseous lesions are identified.      Impression    IMPRESSION:      Normal appendix. Unchanged CT appearance of the abdomen and pelvis.  Consider colonoscopy for recurrent symptoms.    JOAN RONDON MD       Medications   0.9% sodium chloride BOLUS (1,000 mLs Intravenous New Bag 6/15/21 1958)     Followed by   sodium chloride 0.9% infusion (has no administration in time range)   iopamidol (ISOVUE-370) solution 100 mL (100 mLs Intravenous Given 6/15/21 1956)       Assessments & Plan (with Medical Decision Making)     I have reviewed the nursing notes.    I have reviewed the findings, diagnosis, plan and need for follow up with the patient.      New Prescriptions    No medications on file       Final diagnoses:   Viral gastritis   Nausea     Afebrile.  Vital signs stable.  Patient with a history of some recurrent abdominal pain.  Today sudden onset of nausea as well.  3 episodes of emesis but now with just nausea.  IV established and he was given fluids and Reglan initially.  CBC shows normal white blood cells with no left shift.  This is reassuring.  CRP and ESR normal.  Lactic acid is normal.  CMP shows minimal decrease in carbon dioxide at 20 but otherwise unremarkable.  His UA shows some ketones and some protein in his urine but otherwise is unremarkable.  No signs UTI.  CT of his abdomen and pelvis with contrast was performed and this shows no acute findings.  This is reassuring.  Patient is feeling much better with the above treatment.  Viral gastritis with nausea and vomiting.  I discussed symptomatic support and continued monitoring.  Rx for Reglan and Zofran.   Follow-up if there is any concerns for further evaluation as needed.  6/15/2021   [facility]     Michael Iniguez PA-C  06/15/21 0980

## 2021-07-06 ENCOUNTER — MYC MEDICAL ADVICE (OUTPATIENT)
Dept: FAMILY MEDICINE | Facility: OTHER | Age: 26
End: 2021-07-06

## 2021-08-08 ENCOUNTER — APPOINTMENT (OUTPATIENT)
Dept: GENERAL RADIOLOGY | Facility: OTHER | Age: 26
End: 2021-08-08
Attending: STUDENT IN AN ORGANIZED HEALTH CARE EDUCATION/TRAINING PROGRAM
Payer: COMMERCIAL

## 2021-08-08 ENCOUNTER — HOSPITAL ENCOUNTER (EMERGENCY)
Facility: OTHER | Age: 26
Discharge: HOME | End: 2021-08-08
Attending: EMERGENCY MEDICINE | Admitting: EMERGENCY MEDICINE
Payer: COMMERCIAL

## 2021-08-08 VITALS
RESPIRATION RATE: 16 BRPM | HEART RATE: 108 BPM | DIASTOLIC BLOOD PRESSURE: 71 MMHG | SYSTOLIC BLOOD PRESSURE: 134 MMHG | WEIGHT: 197 LBS | BODY MASS INDEX: 29.95 KG/M2 | OXYGEN SATURATION: 98 % | TEMPERATURE: 98.6 F

## 2021-08-08 DIAGNOSIS — S49.92XA INJURY OF LEFT UPPER ARM, INITIAL ENCOUNTER: ICD-10-CM

## 2021-08-08 PROCEDURE — 99283 EMERGENCY DEPT VISIT LOW MDM: CPT | Performed by: EMERGENCY MEDICINE

## 2021-08-08 PROCEDURE — 73060 X-RAY EXAM OF HUMERUS: CPT | Mod: LT

## 2021-08-08 PROCEDURE — 99283 EMERGENCY DEPT VISIT LOW MDM: CPT | Mod: 25 | Performed by: EMERGENCY MEDICINE

## 2021-08-08 RX ORDER — ACETAMINOPHEN 500 MG
500 TABLET ORAL ONCE
Status: DISCONTINUED | OUTPATIENT
Start: 2021-08-08 | End: 2021-08-08 | Stop reason: HOSPADM

## 2021-08-08 NOTE — ED TRIAGE NOTES
Pt here by himself, pt reports that he slipped off a ladder about 45 minutes ago and landed on left arm, pt denies any head or neck injuries, VSS, xray ordered and pt brought back into ER to be evaluated  ED Nursing Triage Note (General)   ________________________________    Hi Kinney is a 26 year old Male that presents to triage private car  With history of  Arm pain reported by patient   Significant symptoms had onset 45 minute(s) ago.  /71   Pulse 108   Temp 98.6  F (37  C) (Tympanic)   Resp 16   Wt 89.4 kg (197 lb)   SpO2 98%   BMI 29.95 kg/m  t  Patient appears alert  and oriented, in no acute distress., and cooperative and pleasant behavior.  Behavior Concerns:   GCS Total = 15  Airway: intact  Breathing noted as Normal  Circulation Normal  Skin:  Normal  Action taken:  Triage to critical care immediately      PRE HOSPITAL PRIOR LIVING SITUATION Alone

## 2021-08-08 NOTE — ED PROVIDER NOTES
History     Chief Complaint   Patient presents with     Shoulder Pain     HPI  Hi Kinney is a 26 year old male who presents with fall and left arm injury.  He was climbing down from his camper and was about 6 feet from the ground when he slipped and fell landing on his left arm.  He has pain in his left arm and shoulder as well as some tingling in his left fingertips.  He is concerned because he had previous fracture in that area.  He does not believe he hit his head.  He has no neck injury.  He has no other pain or injuries that he has noted.  He denies loss of consciousness, nausea, vomiting after the incident.  He laid on the ground for few minutes after the fall but was able to get up on his own.  He has not taken anything for pain.  He has been applying ice.    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     Priority: Medium     Calculus of kidney 04/27/2019     Priority: Medium        Past Medical History:    Past Medical History:   Diagnosis Date     Calculus of kidney 4/27/2019       Past Surgical History:    Past Surgical History:   Procedure Laterality Date     COMBINED CYSTOSCOPY, RETROGRADES, URETEROSCOPY, LASER HOLMIUM LITHOTRIPSY URETER(S), INSERT STENT Right 8/31/2020    Procedure: Right ureteroscopy with holmium laser lithotripsy and stent placement;  Surgeon: Veto Peralta MD;  Location:  OR     COMBINED CYSTOSCOPY, URETEROSCOPY, LASER HOLMIUM LITHOTRIPSY  URETER(S) Left 3/17/2019    Procedure: COMBINED CYSTOSCOPY, URETEROSCOPY, LASER HOLMIUM LITHOTRIPSY URETER(S)and stent placement;  Surgeon: Veto Peralta MD;  Location: [facility] OR     COMBINED CYSTOSCOPY, URETEROSCOPY, LASER HOLMIUM LITHOTRIPSY URETER(S) Left 11/17/2019    Procedure: COMBINED CYSTOSCOPY, URETEROSCOPY, LASER HOLMIUM LITHOTRIPSY URETER(S);  Surgeon: Veto Peralta MD;  Location: [facility] OR     ESOPHAGOSCOPY, GASTROSCOPY, DUODENOSCOPY (EGD), COMBINED N/A 11/22/2019    Procedure: ESOPHAGOGASTRODUODENOSCOPY, WITH BIOPSY;  Surgeon: Arnie García MD;  Location: [facility] OR     GRAFT SKIN FULL THICKNESS FROM EXTREMITY Right     right calf     LAPAROSCOPIC CHOLECYSTECTOMY N/A 11/30/2020    Procedure: CHOLECYSTECTOMY, LAPAROSCOPIC;  Surgeon: Arnie García MD;  Location: [facility] OR     TONSILLECTOMY      No Comments Provided       Family History:    No family history on file.    Social History:  Marital Status:  Single [1]  Social History     Tobacco Use     Smoking status: Never Smoker     Smokeless tobacco: Never Used   Substance Use Topics     Alcohol use: Yes     Comment: rare     Drug use: No        Medications:    FLUoxetine (PROZAC) 20 MG capsule  metoclopramide (REGLAN) 10 MG tablet  ondansetron (ZOFRAN ODT) 4 MG ODT tab  potassium citrate 15 MEQ (1620 MG) TBCR  zolpidem (AMBIEN) 10 MG tablet          Review of Systems  Please seen HPI for pertinent positives and negatives. All other systems reviewed and found to be negative.   Physical Exam   BP: 134/71  Pulse: 108  Temp: 98.6  F (37  C)  Resp: 16  Weight: 89.4 kg (197 lb)  SpO2: 98 %      Physical Exam  Constitutional:       General: He is not in acute distress.  Neck:      Comments: No midline tenderness or pain with flexion/extension/rotation  Cardiovascular:      Comments: +2 radial and ulnar pulses L  Musculoskeletal:      Cervical back: Normal range of motion.      Comments: Mild swelling and tenderness over L mid upper arm. No deformity  Pincer and   strength intact   Skin:     General: Skin is warm and dry.      Findings: No lesion.   Neurological:      Mental Status: He is alert.      Comments: Sensation intact to L hand and fingers         ED Course        Procedures              Critical Care time:  none               Results for orders placed or performed during the hospital encounter of 08/08/21 (from the past 24 hour(s))   XR Humerus Left G/E 2 Views    Narrative    Exam: XR HUMERUS LEFT G/E 2 VIEWS    Technique: Left humerus, 2 views    Comparison: None.    Exam reason: fall and arm pain    Findings:  No acute fracture or dislocation. There is a small osseous  protuberance along the proximal humerus, possibly a small  osteochondroma. No suspicious lytic or blastic lesion.      Impression    Impression:  No acute fracture or dislocation.    MONICA ABARCA MD         SYSTEM ID:  [id number]       Medications   ibuprofen (ADVIL/MOTRIN) tablet 600 mg (has no administration in time range)   acetaminophen (TYLENOL) tablet 500 mg (has no administration in time range)       Assessments & Plan (with Medical Decision Making)     I have reviewed the nursing notes.    I have reviewed the findings, diagnosis, plan and need for follow up with the patient.   MR Kinney is a 27 yo man who presents with a fall and L arm injury. No head or neck injury.  Neurovascularly intact but with some paresthesias. Suspect will resolve over 24-48 hours however recommended close follow up if does not resolve. XR showed possible small osteochondroma, possibly related to previous injury, recommended follow up with orthopedics. Return precautions discussed as detailed in AVS. Patient expressed understanding.     New Prescriptions    No medications on file       Final diagnoses:   Injury of left upper arm, initial encounter       8/8/2021   [facility]     Nano Belcher MD  08/11/21 2821

## 2021-08-08 NOTE — DISCHARGE INSTRUCTIONS
Ibuprofen 600 mg every 6 hours as needed for pain with food and plenty of water. Discontinue use after 5 days.   Tylenol 650 mg every 6 hours as needed for pain.  Do not take more than 3250 mg per day  Sling while walking for support/comfort. Take your arm out of the sling every hour and do 10 pendulum swings and shoulder rotations to avoid frozen shoulder  Apply ice wrapped in a towel to painful areas for 10 minutes 5-6 times/day  Follow up with orthopedics in 1 week for persistent pain  Return to the emergency department for worsening pain, weakness/numbness, discoloration of the hand, severe swelling, vomiting/confusion/severe headache, or if you have any new or changing symptoms/concerns

## 2021-08-12 ENCOUNTER — OFFICE VISIT (OUTPATIENT)
Dept: FAMILY MEDICINE | Facility: OTHER | Age: 26
End: 2021-08-12
Attending: FAMILY MEDICINE
Payer: COMMERCIAL

## 2021-08-12 VITALS
BODY MASS INDEX: 30.65 KG/M2 | HEIGHT: 68 IN | OXYGEN SATURATION: 98 % | SYSTOLIC BLOOD PRESSURE: 122 MMHG | RESPIRATION RATE: 16 BRPM | TEMPERATURE: 97.4 F | DIASTOLIC BLOOD PRESSURE: 72 MMHG | HEART RATE: 75 BPM | WEIGHT: 202.2 LBS

## 2021-08-12 DIAGNOSIS — D16.02: ICD-10-CM

## 2021-08-12 DIAGNOSIS — T14.8XXA CONTUSION OF BONE: ICD-10-CM

## 2021-08-12 DIAGNOSIS — M75.42 SUBACROMIAL IMPINGEMENT OF LEFT SHOULDER: Primary | ICD-10-CM

## 2021-08-12 DIAGNOSIS — M75.52 SUBACROMIAL BURSITIS OF LEFT SHOULDER JOINT: ICD-10-CM

## 2021-08-12 DIAGNOSIS — M67.922 TENDINOPATHY OF LEFT BICEPS TENDON: ICD-10-CM

## 2021-08-12 PROCEDURE — 99214 OFFICE O/P EST MOD 30 MIN: CPT | Performed by: FAMILY MEDICINE

## 2021-08-12 RX ORDER — NAPROXEN SODIUM 220 MG
220 TABLET ORAL 2 TIMES DAILY WITH MEALS
COMMUNITY
End: 2022-09-15

## 2021-08-12 ASSESSMENT — PAIN SCALES - GENERAL: PAINLEVEL: MILD PAIN (3)

## 2021-08-12 ASSESSMENT — MIFFLIN-ST. JEOR: SCORE: 1871.67

## 2021-08-12 NOTE — NURSING NOTE
"Chief Complaint   Patient presents with     Shoulder Injury     left shoulder injury, pain 3/10, DOI: 8/8/21, injured from a fall     Patient presents to clinic today for left shoulder injury. Pain 3/10. DOI: 8/8/21. Injured from falling off of his camper while coming down the ladder. Patient states he fell about 6 ft onto his left side. He takes Naproxen \"every once in a while\" but not on a regular schedule. He does have a past history of a left humerus fracture.     Initial /72 (BP Location: Right arm, Patient Position: Sitting, Cuff Size: Adult Regular)   Pulse 75   Temp 97.4  F (36.3  C) (Tympanic)   Resp 16   Ht 1.727 m (5' 8\")   Wt 91.7 kg (202 lb 3.2 oz)   SpO2 98%   BMI 30.74 kg/m   Estimated body mass index is 30.74 kg/m  as calculated from the following:    Height as of this encounter: 1.727 m (5' 8\").    Weight as of this encounter: 91.7 kg (202 lb 3.2 oz).     FOOD SECURITY SCREENING QUESTIONS  Hunger Vital Signs:  Within the past 12 months we worried whether our food would run out before we got money to buy more. Never  Within the past 12 months the food we bought just didn't last and we didn't have money to get more. Never      Medication Reconciliation: Complete      Natalee Laguna LPN   "

## 2021-08-12 NOTE — PROGRESS NOTES
"HPI:  26-year-old male who works at [employer] as a  coming in for evaluation of left shoulder pain.  Patient reports that on 8/8 he fell off of the ladder on his camper landing onto his left shoulder.  He has had pain since this time.  He was evaluated in the ER where x-rays did not reveal an acute fracture.  He endorses a persistent throbbing pain.  He rates his pain as 3/10.  He has pain with overhead activity.  He has been in a sling since he was seen in the ER.  He denies any radicular symptoms.  He reports a history of a proximal humerus fracture several years ago.      EXAM:  /72 (BP Location: Right arm, Patient Position: Sitting, Cuff Size: Adult Regular)   Pulse 75   Temp 97.4  F (36.3  C) (Tympanic)   Resp 16   Ht 1.727 m (5' 8\")   Wt 91.7 kg (202 lb 3.2 oz)   SpO2 98%   BMI 30.74 kg/m    MUSCULOSKELETAL EXAM:  LEFT SHOULDER  Inspection:  -No gross deformity  -No bruising or swelling  -Scars:  None    Tenderness to palpation of the:  -SC joint:  Negative  -AC joint:  Negative  -Clavicle:  Negative  -Biceps tendon in bicipital groove: Positive  -Deltoid musculature: Positive near the deltoid tuberosity  -Upper trapezius musculature:  Negative    Range of Motion:  -Active flexion:  90 left (170 passively), 170 right  -Active abduction:  90 left (170 passively), 170 right    Strength:  -Supraspinatus:  5/5  -Infraspinatus:  5/5  -Subscapularis:  5/5  -Deltoid:  5/5    Special Tests:  -Priyanka test: Positive pain without weakness  -Neal test: Positive  -Neer test: Positive  -Mid-arc pain: Resident  -Speeds test:  Negative  -O Nolan active compression test:  Negative  -Crossarm adduction test:  Negative  -Lag sign:  Negative    Other:  -Intact sensation to light touch distally.  -No signs of cyanosis. Normal skin temperature of the upper extremity.  -Elbow:  No gross deformity. Full range of motion.  -Hand/wrist:  No gross deformity. Full range of motion.  -Right shoulder:  No gross " deformity. No palpable tenderness. Normal strength.      IMAGIN2021: 3 view left humerus x-ray  -No fracture or dislocation.  There is a outgrowth at the proximal humerus shaft without characteristics of lytic or blastic lesion.      ASSESSMENT/PLAN:  Diagnoses and all orders for this visit:  Subacromial impingement of left shoulder  Subacromial bursitis of left shoulder joint  Contusion of bone  Tendinopathy of left biceps tendon  Osteochondroma of left upper arm    26-year-old male with acute left shoulder pain with symptoms involving stress of the subacromial space.  I suspect that this patient has a flare of subacromial bursitis with likely concomitant tendinopathy of the biceps tendon as well as bony contusion at the proximal humerus.  No abnormalities on exam to suggest structural deficits.  I suspect that this will improve with time and rest.  -Aleve 440 mg twice daily x5-7 days  -Okay to use OTC APAP as needed for breakthrough pain  -Activities as tolerated  -Avoid using the sling  -Workability form filled out (copy made)  -Follow-up in 3-4 weeks as needed  -If still having pain consider formal PT versus CSI versus MRI      Andrade Siegel MD  2021  2:45 PM    Total time spent with this patient was 35 minutes which included chart review, visualization and interpretation of images, time spent with the patient, and documentation.

## 2021-08-12 NOTE — Clinical Note
Dale Pham,    I saw your patient in the office today for an acute shoulder injury.  No further work-up at this time.  If his pain persists, I can see him back in follow-up.  Please let me know if you have questions.  Thanks.    Andrade

## 2021-08-16 DIAGNOSIS — N20.0 KIDNEY STONES: Primary | ICD-10-CM

## 2021-08-16 NOTE — PROGRESS NOTES
"Per last office visit  6/14/21 with Veto Peralta MD \"Plan  Continue KCitrate 15 mEq - take 2 tablets (30 mEq) BID  Follow up in 6 months with a BMP then once annually thereafter\"        Orders Placed    "

## 2021-10-09 ENCOUNTER — HEALTH MAINTENANCE LETTER (OUTPATIENT)
Age: 26
End: 2021-10-09

## 2021-10-25 ENCOUNTER — HOSPITAL ENCOUNTER (OUTPATIENT)
Facility: OTHER | Age: 26
End: 2021-10-25
Attending: UROLOGY | Admitting: UROLOGY
Payer: COMMERCIAL

## 2021-10-25 ENCOUNTER — HOSPITAL ENCOUNTER (OUTPATIENT)
Dept: ULTRASOUND IMAGING | Facility: OTHER | Age: 26
End: 2021-10-25
Attending: UROLOGY
Payer: COMMERCIAL

## 2021-10-25 ENCOUNTER — OFFICE VISIT (OUTPATIENT)
Dept: UROLOGY | Facility: OTHER | Age: 26
End: 2021-10-25
Attending: UROLOGY
Payer: COMMERCIAL

## 2021-10-25 VITALS
BODY MASS INDEX: 30.5 KG/M2 | OXYGEN SATURATION: 97 % | DIASTOLIC BLOOD PRESSURE: 80 MMHG | WEIGHT: 200.6 LBS | HEART RATE: 92 BPM | RESPIRATION RATE: 16 BRPM | SYSTOLIC BLOOD PRESSURE: 128 MMHG

## 2021-10-25 DIAGNOSIS — R10.9 LEFT FLANK PAIN: Primary | ICD-10-CM

## 2021-10-25 DIAGNOSIS — R82.991 HYPOCITRATURIA: ICD-10-CM

## 2021-10-25 DIAGNOSIS — N20.0 KIDNEY STONES: ICD-10-CM

## 2021-10-25 DIAGNOSIS — N20.1 LEFT URETERAL STONE: ICD-10-CM

## 2021-10-25 LAB
ALBUMIN UR-MCNC: NEGATIVE MG/DL
APPEARANCE UR: CLEAR
BILIRUB UR QL STRIP: NEGATIVE
COLOR UR AUTO: ABNORMAL
GLUCOSE UR STRIP-MCNC: NEGATIVE MG/DL
HGB UR QL STRIP: ABNORMAL
KETONES UR STRIP-MCNC: NEGATIVE MG/DL
LEUKOCYTE ESTERASE UR QL STRIP: NEGATIVE
MUCOUS THREADS #/AREA URNS LPF: PRESENT /LPF
NITRATE UR QL: NEGATIVE
PH UR STRIP: 6.5 [PH] (ref 5–9)
RBC URINE: 10 /HPF
SP GR UR STRIP: 1.02 (ref 1–1.03)
UROBILINOGEN UR STRIP-MCNC: NORMAL MG/DL
WBC URINE: 1 /HPF

## 2021-10-25 PROCEDURE — 99214 OFFICE O/P EST MOD 30 MIN: CPT | Performed by: UROLOGY

## 2021-10-25 PROCEDURE — 76770 US EXAM ABDO BACK WALL COMP: CPT

## 2021-10-25 PROCEDURE — 81001 URINALYSIS AUTO W/SCOPE: CPT | Mod: ZL | Performed by: UROLOGY

## 2021-10-25 RX ORDER — HYDROCODONE BITARTRATE AND ACETAMINOPHEN 5; 325 MG/1; MG/1
1-2 TABLET ORAL EVERY 4 HOURS PRN
Status: CANCELLED | OUTPATIENT
Start: 2021-10-25

## 2021-10-25 ASSESSMENT — PAIN SCALES - GENERAL: PAINLEVEL: MODERATE PAIN (4)

## 2021-10-25 NOTE — PROGRESS NOTES
Type of Visit  Established    Chief Complaint  History of kidney stones  Hypocitraturia    HPI  Mr. Kinney is a 26 year old male with history of kidney stones for the last 16 years.  He presents due to left flank pain for the past 3 weeks.  His last imaging was 4 months ago revealing no stones at that time.  The pain is intermittent and consistent with past episodes of renal colic.  He denies fevers but also has intermittent dysuria and pelvic discomfort.    The patient has undergone a work-up previously revealing hypocitraturia.  The patient has been taking 30 mEq of potassium citrate as split dose 15 mEq twice daily.  Previously he was experiencing issues with GI upset.  Now that he has been on medication chronically he is tolerating the medication well.      Review of Systems  I reviewed the ROS with the patient.    Nursing Notes:   Paula Sewell LPN  10/25/2021 11:35 AM  Addendum  Chief Complaint   Patient presents with     Follow Up     left flank pain      Patient presents to the clinic today for left flank pain     Review of Systems:    Weight loss:    No     Recent fever/chills:  Yes   Night sweats:   No  Current skin rash:  No   Recent hair loss:  No  Heat intolerance:  No   Cold intolerance:  No  Chest pain:   No   Palpitations:   No  Shortness of breath:  No   Wheezing:   No  Constipation:    No   Diarrhea:   yes   Nausea:   No   Vomiting:   No   Kidney/side pain:  Yes   Back pain:   Yes  Frequent headaches:  No   Dizziness:     No  Leg swelling:   No   Calf pain:    No      Medication Reconciliation: completed   Paula Sewell LPN  10/25/2021 11:32 AM     Physical Exam  /80 (BP Location: Right arm, Patient Position: Sitting, Cuff Size: Adult Large)   Pulse 92   Resp 16   Wt 91 kg (200 lb 9.6 oz)   SpO2 97%   BMI 30.50 kg/m    Constitutional: NAD, WDWN.  Cardiovascular: Regular rate.  Pulmonary/Chest: Respirations are even and non-labored bilaterally.  Abdominal: Soft. No distension, tenderness,  masses or guarding.  Back: - left CVA tenderness,  - right CVA tenderness.   Extremities: ADELA x 4, Warm. No clubbing.  No cyanosis.    Skin: Pink, warm and dry.  No rashes noted.    Labs  Results for TAMMY WALKER (MRN [id number]) as of 10/25/2021 12:50   10/25/2021 11:42   Color Urine Light Yellow   Appearance Urine Clear   Glucose Urine Negative   Bilirubin Urine Negative   Ketones Urine Negative   Specific Gravity Urine 1.023   pH Urine 6.5   Protein Albumin Urine Negative   Urobilinogen mg/dL Normal   Nitrite Urine Negative   Blood Urine Small (A)   Leukocyte Esterase Urine Negative   WBC Urine 1   RBC Urine 10 (H)   Mucus Urine Present (A)     Results for TAMMY WALKER (MRN [id number]) as of 5/15/2019 09:47   3/25/2019 08:46   Magnesium 1.9   Phosphorus 1.8 (L)   Parathyroid Hormone Intact 16     Stone Analysis  3/17/2019  20% calcium oxalate  80% calcium phosphate    [facility]     4/28/2019 & 4/29/2019  Volume (L)  2.1-4.5   SS CaOx  2.27-5.4 (6-10)  Urine Calcium  203-213 (male<250)  Urine Oxalate  32-34  (20-40)  Urine Citrate  238-362 (male>450)  SS CaP  0.8-1.4  (0.5-2)  24 hr Urine pH  6.4-6.8  (5.8-6.2)  SS Uric Acid  0.07-0.31 (0-1)  Urine uric acid  0.76-0.777 (<0.8)    (collected while on treatment:  None)    Na: 107-206 and Cl:     Radiographic Studies  I personally reviewed and interpreted the images and report.  WAQAS  10/25/2021  Left hydronephrosis  Report pending    ^^^^^^^^^^^^^^^^^^^^^^^^^^^^^^^^^^^^^^^^    CT Stone  5/24/2021  IMPRESSION:   1. No CT evidence of acute intra-abdominal or pelvic process.   2. Status post cholecystectomy.   3. No CT evidence of nephrolithiasis or obstructive uropathy.   4. Normal appearing appendix in the right lower quadrant.   5. Large amount of stool in the rectosigmoid colon.     ^^^^^^^^^^^^^^^^^^^^^^^^^^^^^^^^^^^^^^^^    CT Stone  4/26/2019  IMPRESSION:   1. No acute osseous pelvic CT findings.   2. Stable mild fullness of the right renal collecting  "system. Is there chronic in nature.   3. 1 mm nonobstructing left renal calculus.    Assessment  Mr. Kinney is a 26 year old male with history of kidney stones with hypocitraturia who has left flank pain and hydronephrosis suggesting an obstructing stone.  He will continue to try to pass the stone over the next week and if unsuccessful we will treat Tuesday.    Discussed the treatment options for the ureteral stone including trial of passage and ureteroscopy with laser lithotripsy.    After explaining the risks, benefits, and alternatives a decision was made to proceed with ureteroscopy/laser lithotripsy.    The patient was explained the specific risks of bleeding, pain, infection, and ureteral injury.    In addition, the patient was told a stent may need to be placed which could result in urinary frequency, urgency, dysuria, and flank pain with voiding.    It would require an office based procedure to remove it at a later date.    Finally, the patient was told if the stone was very impacted, that we would place a stent and return at a later date to treat the stone.      Plan  Continue KCitrate 15 mEq - take 2 tablets (30 mEq) BID  The patient was scheduled and consented for \"left ureteroscopy with holmium laser lithotripsy and stent placement\" in the OR (LMA general anesthesia).  "

## 2021-10-25 NOTE — NURSING NOTE
Chief Complaint   Patient presents with     Follow Up     left flank pain      Patient presents to the clinic today for left flank pain     Review of Systems:    Weight loss:    No     Recent fever/chills:  Yes   Night sweats:   No  Current skin rash:  No   Recent hair loss:  No  Heat intolerance:  No   Cold intolerance:  No  Chest pain:   No   Palpitations:   No  Shortness of breath:  No   Wheezing:   No  Constipation:    No   Diarrhea:   yes   Nausea:   No   Vomiting:   No   Kidney/side pain:  Yes   Back pain:   Yes  Frequent headaches:  No   Dizziness:     No  Leg swelling:   No   Calf pain:    No      Medication Reconciliation: completed   Paula Sewell LPN  10/25/2021 11:32 AM

## 2021-10-26 ENCOUNTER — TELEPHONE (OUTPATIENT)
Dept: UROLOGY | Facility: OTHER | Age: 26
End: 2021-10-26

## 2021-10-26 DIAGNOSIS — R10.9 LEFT FLANK PAIN: Primary | ICD-10-CM

## 2021-10-26 RX ORDER — HYDROCODONE BITARTRATE AND ACETAMINOPHEN 5; 325 MG/1; MG/1
1-2 TABLET ORAL EVERY 4 HOURS PRN
Qty: 20 TABLET | Refills: 0 | Status: SHIPPED | OUTPATIENT
Start: 2021-10-26 | End: 2022-09-15

## 2021-10-26 NOTE — TELEPHONE ENCOUNTER
Patient called and left a message. He has a question regarding pain medication. He states it was discussed at his appointment yesterday. Please contact patient.    Rebeca Hawkins on 10/26/2021 at 1:30 PM

## 2021-10-26 NOTE — TELEPHONE ENCOUNTER
After proper verification, patient stated he would like pain medication as discussed with provider yesterday, the pain is rated at a 8 and has increased since yesterday. Patient uses [facility] for a pharmacy.  Paula Sewell LPN on 10/26/2021 at 1:48 PM

## 2021-10-26 NOTE — TELEPHONE ENCOUNTER
After proper verification, patient was informed that provider had a RX ready for , Patient stated that he was at work today and would like to pick it up tomorrow.  Paula Sewell LPN on 10/26/2021 at 2:52 PM

## 2021-10-29 ENCOUNTER — ALLIED HEALTH/NURSE VISIT (OUTPATIENT)
Dept: FAMILY MEDICINE | Facility: OTHER | Age: 26
End: 2021-10-29
Attending: FAMILY MEDICINE
Payer: COMMERCIAL

## 2021-10-29 DIAGNOSIS — Z20.822 COVID-19 RULED OUT: Primary | ICD-10-CM

## 2021-10-29 PROCEDURE — C9803 HOPD COVID-19 SPEC COLLECT: HCPCS

## 2021-10-29 PROCEDURE — U0005 INFEC AGEN DETEC AMPLI PROBE: HCPCS | Mod: ZL

## 2021-10-30 LAB — SARS-COV-2 RNA RESP QL NAA+PROBE: NEGATIVE

## 2021-11-01 ENCOUNTER — ANESTHESIA EVENT (OUTPATIENT)
Dept: SURGERY | Facility: OTHER | Age: 26
End: 2021-11-01

## 2021-11-01 RX ORDER — ONDANSETRON 4 MG/1
4 TABLET, ORALLY DISINTEGRATING ORAL EVERY 30 MIN PRN
Status: CANCELLED | OUTPATIENT
Start: 2021-11-01

## 2021-11-01 RX ORDER — MEPERIDINE HYDROCHLORIDE 50 MG/ML
12.5 INJECTION INTRAMUSCULAR; INTRAVENOUS; SUBCUTANEOUS
Status: CANCELLED | OUTPATIENT
Start: 2021-11-01

## 2021-11-01 RX ORDER — LIDOCAINE 40 MG/G
CREAM TOPICAL
Status: CANCELLED | OUTPATIENT
Start: 2021-11-01

## 2021-11-01 RX ORDER — SODIUM CHLORIDE 9 MG/ML
INJECTION, SOLUTION INTRAVENOUS CONTINUOUS
Status: CANCELLED | OUTPATIENT
Start: 2021-11-01

## 2021-11-01 RX ORDER — ONDANSETRON 2 MG/ML
4 INJECTION INTRAMUSCULAR; INTRAVENOUS EVERY 30 MIN PRN
Status: CANCELLED | OUTPATIENT
Start: 2021-11-01

## 2021-11-01 RX ORDER — HYDROMORPHONE HYDROCHLORIDE 1 MG/ML
0.4 INJECTION, SOLUTION INTRAMUSCULAR; INTRAVENOUS; SUBCUTANEOUS EVERY 5 MIN PRN
Status: CANCELLED | OUTPATIENT
Start: 2021-11-01

## 2021-11-01 RX ORDER — FENTANYL CITRATE 50 UG/ML
50 INJECTION, SOLUTION INTRAMUSCULAR; INTRAVENOUS
Status: CANCELLED | OUTPATIENT
Start: 2021-11-01

## 2021-11-01 RX ORDER — FENTANYL CITRATE 50 UG/ML
50 INJECTION, SOLUTION INTRAMUSCULAR; INTRAVENOUS EVERY 5 MIN PRN
Status: CANCELLED | OUTPATIENT
Start: 2021-11-01

## 2021-11-01 RX ORDER — OXYCODONE HYDROCHLORIDE 5 MG/1
5 TABLET ORAL EVERY 4 HOURS PRN
Status: CANCELLED | OUTPATIENT
Start: 2021-11-01

## 2021-11-02 ENCOUNTER — ANESTHESIA (OUTPATIENT)
Dept: SURGERY | Facility: OTHER | Age: 26
End: 2021-11-02

## 2021-12-06 ENCOUNTER — MYC REFILL (OUTPATIENT)
Dept: FAMILY MEDICINE | Facility: OTHER | Age: 26
End: 2021-12-06
Payer: COMMERCIAL

## 2021-12-06 DIAGNOSIS — F51.01 PRIMARY INSOMNIA: ICD-10-CM

## 2021-12-06 NOTE — TELEPHONE ENCOUNTER
Last office visit with Enoch Marrufo MD 5-.  Patient was advised to follow up in 1 month.  Patient has been into the clinic multiple times due to acute care concerns.      Leti Sethi LPN 12/6/2021 5:01 PM

## 2021-12-07 RX ORDER — ZOLPIDEM TARTRATE 10 MG/1
10 TABLET ORAL
Qty: 30 TABLET | Refills: 0 | Status: SHIPPED | OUTPATIENT
Start: 2021-12-07 | End: 2022-01-16

## 2022-01-16 ENCOUNTER — MYC REFILL (OUTPATIENT)
Dept: FAMILY MEDICINE | Facility: OTHER | Age: 27
End: 2022-01-16
Payer: COMMERCIAL

## 2022-01-16 DIAGNOSIS — F51.01 PRIMARY INSOMNIA: ICD-10-CM

## 2022-01-20 RX ORDER — ZOLPIDEM TARTRATE 10 MG/1
10 TABLET ORAL
Qty: 30 TABLET | Refills: 0 | Status: SHIPPED | OUTPATIENT
Start: 2022-01-20 | End: 2022-02-26

## 2022-01-20 NOTE — TELEPHONE ENCOUNTER
Patient sent Rx request for the following:      Requested Prescriptions   Pending Prescriptions Disp Refills     zolpidem (AMBIEN) 10 MG tablet 30 tablet 0     Sig: Take 1 tablet (10 mg) by mouth nightly as needed for sleep       There is no refill protocol information for this order        Last Prescription Date:   12/7/21  Last Fill Qty/Refills:         30, R-0  Last Office Visit:            8/12/21  Future Office visit:           None  Routing refill request to provider for review/approval because:  Drug not on the [facility], [facility] or [facility] refill protocol or controlled substance  Unable to complete prescription refill per RN Medication Refill Policy.     In clinical absence of patient's primary, Vero García, patient is requesting that this message be sent to the covering provider for consideration please.    Tamy Garcia RN on 1/20/2022 at 3:26 PM

## 2022-01-30 ENCOUNTER — LAB REQUISITION (OUTPATIENT)
Dept: LAB | Facility: OTHER | Age: 27
End: 2022-01-30

## 2022-01-30 LAB
FLUAV RNA SPEC QL NAA+PROBE: NEGATIVE
FLUBV RNA RESP QL NAA+PROBE: NEGATIVE
RSV RNA SPEC NAA+PROBE: NEGATIVE
SARS-COV-2 RNA RESP QL NAA+PROBE: NEGATIVE

## 2022-01-30 PROCEDURE — 87637 SARSCOV2&INF A&B&RSV AMP PRB: CPT | Performed by: FAMILY MEDICINE

## 2022-02-26 ENCOUNTER — MYC REFILL (OUTPATIENT)
Dept: FAMILY MEDICINE | Facility: OTHER | Age: 27
End: 2022-02-26
Payer: COMMERCIAL

## 2022-02-26 DIAGNOSIS — F51.01 PRIMARY INSOMNIA: ICD-10-CM

## 2022-02-28 RX ORDER — ZOLPIDEM TARTRATE 10 MG/1
10 TABLET ORAL
Qty: 30 TABLET | Refills: 0 | Status: SHIPPED | OUTPATIENT
Start: 2022-02-28 | End: 2022-09-15

## 2022-03-30 ENCOUNTER — MYC REFILL (OUTPATIENT)
Dept: FAMILY MEDICINE | Facility: OTHER | Age: 27
End: 2022-03-30
Payer: COMMERCIAL

## 2022-03-30 DIAGNOSIS — F51.01 PRIMARY INSOMNIA: Primary | ICD-10-CM

## 2022-04-05 RX ORDER — ZOLPIDEM TARTRATE 10 MG/1
10 TABLET ORAL
Qty: 30 TABLET | OUTPATIENT
Start: 2022-04-05

## 2022-04-06 NOTE — TELEPHONE ENCOUNTER
Last office visit with Enoch Marrufo MD May 2021, was encouraged to have a 1 month follow up.  Patient saw Vero SAAVEDRA June 2021.  No other office visits to get Ambien refilled/addressed.    Leti Sethi LPN 3/31/2022 4:35 PM      
Patient needs appointment for routine physical and to evaluate his use of ambien. JASMINE Mott CNP on 4/5/2022 at 8:00 AM    
Patient was notified to schedule appointment.    Madelin Whyte LPN on 4/6/2022 at 9:37 AM    
no

## 2022-04-15 ENCOUNTER — OFFICE VISIT (OUTPATIENT)
Dept: FAMILY MEDICINE | Facility: OTHER | Age: 27
End: 2022-04-15
Attending: NURSE PRACTITIONER
Payer: COMMERCIAL

## 2022-04-15 VITALS
HEART RATE: 92 BPM | HEIGHT: 68 IN | TEMPERATURE: 97 F | RESPIRATION RATE: 18 BRPM | BODY MASS INDEX: 30.84 KG/M2 | WEIGHT: 203.5 LBS | DIASTOLIC BLOOD PRESSURE: 70 MMHG | SYSTOLIC BLOOD PRESSURE: 132 MMHG

## 2022-04-15 DIAGNOSIS — H92.02 LEFT EAR PAIN: ICD-10-CM

## 2022-04-15 DIAGNOSIS — H66.92 LEFT ACUTE OTITIS MEDIA: Primary | ICD-10-CM

## 2022-04-15 DIAGNOSIS — J02.9 ACUTE PHARYNGITIS, UNSPECIFIED ETIOLOGY: ICD-10-CM

## 2022-04-15 PROCEDURE — 99213 OFFICE O/P EST LOW 20 MIN: CPT | Performed by: NURSE PRACTITIONER

## 2022-04-15 RX ORDER — AMOXICILLIN 875 MG
875 TABLET ORAL 2 TIMES DAILY
Qty: 14 TABLET | Refills: 0 | Status: SHIPPED | OUTPATIENT
Start: 2022-04-15 | End: 2022-04-22

## 2022-04-15 ASSESSMENT — PAIN SCALES - GENERAL: PAINLEVEL: MODERATE PAIN (5)

## 2022-04-15 NOTE — PROGRESS NOTES
ASSESSMENT/PLAN:     I have reviewed the nursing notes.  I have reviewed the findings, diagnosis, plan and need for follow up with the patient.      1. Left ear pain    May use over-the-counter Tylenol or ibuprofen PRN    2. Acute pharyngitis, unspecified etiology    - amoxicillin (AMOXIL) 875 MG tablet; Take 1 tablet (875 mg) by mouth 2 times daily for 7 days  Dispense: 14 tablet; Refill: 0    May use over-the-counter Tylenol or ibuprofen PRN  Symptomatic treatment - Encouraged fluids, salt water gargles, honey, elevation, humidifier, saline nasal spray, sinus rinse/netti pot, lozenges, tea, soup, smoothies, popsicles, topical vapor rub, rest, etc     3. Left acute otitis media    - amoxicillin (AMOXIL) 875 MG tablet; Take 1 tablet (875 mg) by mouth 2 times daily for 7 days  Dispense: 14 tablet; Refill: 0    Discussed warning signs/symptoms indicative of need to f/u  Follow up if symptoms persist or worsen or concerns      I explained my diagnostic considerations and recommendations to the patient, who voiced understanding and agreement with the treatment plan. All questions were answered. We discussed potential side effects of any prescribed or recommended therapies, as well as expectations for response to treatments.    Jill Guidry NP  [facility]      SUBJECTIVE:   Hi Kinney is a 26 year old male who presents to clinic today for the following health issues:  Sore throat and ear pain    HPI  Left sided sore throat x 3 days.  Left ear pain x 3 days.  Stuffy nose.  No cough.  No breathing concerns.  No headaches.  No fevers.   Normal appetite.  Hx of tonsillectomy.  Hx of occasional ear infections as an adult.  Taking tylenol, last about 2 hours ago.        Past Medical History:   Diagnosis Date     Calculus of kidney 4/27/2019     Past Surgical History:   Procedure Laterality Date     COMBINED CYSTOSCOPY, RETROGRADES, URETEROSCOPY, LASER HOLMIUM LITHOTRIPSY URETER(S), INSERT STENT  Right 8/31/2020    Procedure: Right ureteroscopy with holmium laser lithotripsy and stent placement;  Surgeon: Veto Peralta MD;  Location: [facility] OR     COMBINED CYSTOSCOPY, URETEROSCOPY, LASER HOLMIUM LITHOTRIPSY URETER(S) Left 3/17/2019    Procedure: COMBINED CYSTOSCOPY, URETEROSCOPY, LASER HOLMIUM LITHOTRIPSY URETER(S)and stent placement;  Surgeon: Veto Peralta MD;  Location: [facility] OR     COMBINED CYSTOSCOPY, URETEROSCOPY, LASER HOLMIUM LITHOTRIPSY URETER(S) Left 11/17/2019    Procedure: COMBINED CYSTOSCOPY, URETEROSCOPY, LASER HOLMIUM LITHOTRIPSY URETER(S);  Surgeon: Veto Peralta MD;  Location: [facility] OR     ESOPHAGOSCOPY, GASTROSCOPY, DUODENOSCOPY (EGD), COMBINED N/A 11/22/2019    Procedure: ESOPHAGOGASTRODUODENOSCOPY, WITH BIOPSY;  Surgeon: Arnie García MD;  Location: [facility] OR     GRAFT SKIN FULL THICKNESS FROM EXTREMITY Right     right calf     LAPAROSCOPIC CHOLECYSTECTOMY N/A 11/30/2020    Procedure: CHOLECYSTECTOMY, LAPAROSCOPIC;  Surgeon: Arnie García MD;  Location: [facility] OR     TONSILLECTOMY      No Comments Provided     Social History     Tobacco Use     Smoking status: Never Smoker     Smokeless tobacco: Never Used   Substance Use Topics     Alcohol use: Yes     Comment: rare     Current Outpatient Medications   Medication Sig Dispense Refill     potassium citrate 15 MEQ (1620 MG) TBCR Take 2 tablets by mouth 2 times daily (with meals) 360 tablet 3     FLUoxetine (PROZAC) 20 MG capsule Take 1 capsule (20 mg) by mouth daily (Patient not taking: No sig reported) 30 capsule 0     HYDROcodone-acetaminophen (NORCO) 5-325 MG tablet Take 1-2 tablets by mouth every 4 hours as needed for severe pain (maximum of 10 tablets per 24 hours) (Patient not taking: Reported on 4/15/2022) 20 tablet 0     metoclopramide (REGLAN) 10 MG tablet Take 1 tablet (10 mg) by mouth 3 times daily as needed (nausea) (Patient not taking: No sig reported) 30 tablet 0     naproxen sodium (ANAPROX) 220 MG tablet Take 220 mg by mouth 2 times  "daily (with meals) (Patient not taking: Reported on 4/15/2022)       ondansetron (ZOFRAN ODT) 4 MG ODT tab Take 1 tablet (4 mg) by mouth every 6 hours as needed for nausea (Patient not taking: No sig reported) 30 tablet 0     zolpidem (AMBIEN) 10 MG tablet Take 1 tablet (10 mg) by mouth nightly as needed for sleep (Patient not taking: Reported on 4/15/2022) 30 tablet 0     Allergies   Allergen Reactions     Augmentin Rash     Childhood reaction     Cefprozil Rash     Childhood Rxn         Past medical history, past surgical history, current medications and allergies reviewed and accurate to the best of my knowledge.        OBJECTIVE:     /70   Pulse 92   Temp 97  F (36.1  C) (Tympanic)   Resp 18   Ht 1.727 m (5' 8\")   Wt 92.3 kg (203 lb 8 oz)   BMI 30.94 kg/m    Body mass index is 30.94 kg/m .     Physical Exam  General Appearance: Well appearing adult male, non ill appearance, appropriate appearance for age. No acute distress  Ears: Left TM intact with decreased bony landmarks appreciated, no erythema, dull effusion with bulging.  Right TM intact with bony landmarks appreciated, no erythema, no effusion, no bulging, no purulence.  Left auditory canal clear without drainage or bleeding.  Right auditory canal clear without drainage or bleeding.  Normal external ears, non tender.  Eyes: conjunctivae normal without erythema or irritation, corneas clear, no drainage or crusting, no eyelid swelling, pupils equal   Orophayrnx: moist mucous membranes, pharynx without erythema, left tonsillar area with erythema, swelling and appearance of regrowth, right tonsils surgically absent, no oral lesions, no palate petechiae, no post nasal drip seen, no trismus, voice clear.    Nose:  No active drainage   Neck: no palpable lymph nodes, left sided tenderness   Respiratory: normal chest wall and respirations.  Normal effort.  No cough appreciated.  Musculoskeletal:  Equal movement of bilateral upper extremities.  Equal " movement of bilateral lower extremities.  Normal gait.    Psychological: normal affect, alert, oriented, and pleasant.

## 2022-04-15 NOTE — NURSING NOTE
"Chief Complaint   Patient presents with     Throat Pain     Patient is here for a sore throat and pain in the left ear that started about 3 days ago. Patient took Tylenol last around 4pm    Initial /70   Pulse 92   Temp 97  F (36.1  C) (Tympanic)   Resp 18   Ht 1.727 m (5' 8\")   Wt 92.3 kg (203 lb 8 oz)   BMI 30.94 kg/m   Estimated body mass index is 30.94 kg/m  as calculated from the following:    Height as of this encounter: 1.727 m (5' 8\").    Weight as of this encounter: 92.3 kg (203 lb 8 oz).  Medication Reconciliation: complete    Neli Kidd LPN  "

## 2022-05-21 ENCOUNTER — HEALTH MAINTENANCE LETTER (OUTPATIENT)
Age: 27
End: 2022-05-21

## 2022-09-13 ENCOUNTER — E-VISIT (OUTPATIENT)
Dept: FAMILY MEDICINE | Facility: OTHER | Age: 27
End: 2022-09-13
Payer: COMMERCIAL

## 2022-09-13 DIAGNOSIS — F51.01 PRIMARY INSOMNIA: ICD-10-CM

## 2022-09-13 PROCEDURE — 99421 OL DIG E/M SVC 5-10 MIN: CPT | Performed by: NURSE PRACTITIONER

## 2022-09-15 RX ORDER — ZOLPIDEM TARTRATE 10 MG/1
10 TABLET ORAL
Qty: 30 TABLET | Refills: 3 | Status: SHIPPED | OUTPATIENT
Start: 2022-09-15 | End: 2023-03-28

## 2022-09-17 ENCOUNTER — HEALTH MAINTENANCE LETTER (OUTPATIENT)
Age: 27
End: 2022-09-17

## 2023-03-27 DIAGNOSIS — F51.01 PRIMARY INSOMNIA: ICD-10-CM

## 2023-03-28 ENCOUNTER — MYC MEDICAL ADVICE (OUTPATIENT)
Dept: FAMILY MEDICINE | Facility: OTHER | Age: 28
End: 2023-03-28
Payer: COMMERCIAL

## 2023-03-28 RX ORDER — ZOLPIDEM TARTRATE 10 MG/1
TABLET ORAL
Qty: 30 TABLET | Refills: 0 | Status: SHIPPED | OUTPATIENT
Start: 2023-03-28 | End: 2023-05-03

## 2023-03-28 NOTE — TELEPHONE ENCOUNTER
BERE sent Rx request for the following:    ZOLPIDEM 10MG TABLET  Last Prescription Date:   9/15/22  Last Fill Qty/Refills:         30, R-3    Last Office Visit:              6/15/21   Future Office visit:           None  Patient needs appointment.  Tamy Garcia RN on 3/28/2023 at 3:43 PM

## 2023-05-01 DIAGNOSIS — F51.01 PRIMARY INSOMNIA: ICD-10-CM

## 2023-05-03 ENCOUNTER — TELEPHONE (OUTPATIENT)
Dept: FAMILY MEDICINE | Facility: OTHER | Age: 28
End: 2023-05-03
Payer: COMMERCIAL

## 2023-05-03 RX ORDER — ZOLPIDEM TARTRATE 10 MG/1
TABLET ORAL
Qty: 15 TABLET | Refills: 0 | Status: SHIPPED | OUTPATIENT
Start: 2023-05-03 | End: 2023-05-03

## 2023-05-03 NOTE — TELEPHONE ENCOUNTER
Patient was notified he will need to keep his current appointment but a prescription was sent in to get him through to his appointment.    Madelin Whyte LPN on 5/3/2023 at 11:17 AM

## 2023-05-03 NOTE — TELEPHONE ENCOUNTER
15 tablets of Ambien were provided to get him through to his appointment.  Please make sure he is aware that if he does not come into the clinic for his appointment, no further refills will be provided to bridge further gaps. JASMINE Mott CNP on 5/3/2023 at 11:12 AM

## 2023-05-03 NOTE — PROGRESS NOTES
Pre-Visit Planning   Next 5 appointments (look out 90 days)    May 04, 2023  9:40 AM  PHYSICAL with JASMINE [facility] ([facility] ) [address]  [place] [id number] [phone]        Appointment Notes for this encounter:   PX /  med refill ok per PCP    Questionnaires Reviewed/Assigned  Additional questionnaires assigned: [email or web address]    Patient preferred phone number: [phone]      Contacted patient via phone/[employer]. Are there any additional questions or concerns you'd like to review with your provider during your visit? Yes:   -poor sleep without use of ambien    Visit is not preventive.    Meds  Home Meds reviewed and updated  Refills pended    Entered patient-preferred pharmacy.     Current Outpatient Medications   Medication     potassium citrate 15 MEQ (1620 MG) TBCR     zolpidem (AMBIEN) 10 MG tablet     No current facility-administered medications for this visit.     MyChart  Patient is active on [employer].    Questionnaire Review   Offered information on completing questionnaires via [employer].  Advised patient to arrive early in order to complete questionnaires.    Call Summary  Advised patient to call back at [phone] if needed.     Corinne R Thayer, RN on 5/3/2023 at 3:40 PM

## 2023-05-03 NOTE — TELEPHONE ENCOUNTER
Please call the patient with a work in tomorrow, Thursday, for a medication refill eith his PCP  Vero García.   He does have an appointment set up on the 17th  But he is off work on Thursdays and this would help him a lot.          Lovely Read on 5/3/2023 at 11:06 AM

## 2023-05-03 NOTE — TELEPHONE ENCOUNTER
"  Last Prescription Date: 3/28/23  Last Qty/Refills: 30 / 0  Last Office Visit: 6/15/21  Future Office Visit: 5/17/23     Spoke to provider in regards to patient's request for medication. Patient has not been seen in clinic by provider in almost two years. Last prescription of ambien was done through E-visit. Patient was called and instructed that he needs to schedule an in person appointment and he did become upset stating he is \"too busy at work\". States he \"hasn't slept in two days due to no medication and he is trying to reach out and is not receiving help\". Patient was transferred to scheduling and setup appointment with provider on 5/17/23. Please advised pended medication to last until appointment or if patient will need to be seen prior to refill    Corinne R Thayer, RN on 5/3/2023 at 10:40 AM      Requested Prescriptions   Pending Prescriptions Disp Refills     zolpidem (AMBIEN) 10 MG tablet [Pharmacy Med Name: ZOLPIDEM 10MG TABLET] 30 tablet 0     Sig: TAKE 1 TABLET (10 MG) BY MOUTH NIGHTLY AS NEEDED KHANH       There is no refill protocol information for this order          "

## 2023-05-04 ENCOUNTER — OFFICE VISIT (OUTPATIENT)
Dept: FAMILY MEDICINE | Facility: OTHER | Age: 28
End: 2023-05-04
Attending: NURSE PRACTITIONER
Payer: COMMERCIAL

## 2023-05-04 VITALS
DIASTOLIC BLOOD PRESSURE: 74 MMHG | HEART RATE: 112 BPM | WEIGHT: 211.6 LBS | TEMPERATURE: 97.6 F | BODY MASS INDEX: 32.07 KG/M2 | SYSTOLIC BLOOD PRESSURE: 120 MMHG | OXYGEN SATURATION: 97 % | HEIGHT: 68 IN | RESPIRATION RATE: 16 BRPM

## 2023-05-04 DIAGNOSIS — Z00.00 ROUTINE GENERAL MEDICAL EXAMINATION AT A HEALTH CARE FACILITY: Primary | ICD-10-CM

## 2023-05-04 DIAGNOSIS — E66.811 CLASS 1 OBESITY WITHOUT SERIOUS COMORBIDITY WITH BODY MASS INDEX (BMI) OF 32.0 TO 32.9 IN ADULT, UNSPECIFIED OBESITY TYPE: ICD-10-CM

## 2023-05-04 DIAGNOSIS — F51.01 PRIMARY INSOMNIA: ICD-10-CM

## 2023-05-04 PROCEDURE — 99212 OFFICE O/P EST SF 10 MIN: CPT | Mod: 25 | Performed by: NURSE PRACTITIONER

## 2023-05-04 PROCEDURE — 99395 PREV VISIT EST AGE 18-39: CPT | Performed by: NURSE PRACTITIONER

## 2023-05-04 RX ORDER — ZOLPIDEM TARTRATE 10 MG/1
10 TABLET ORAL
Qty: 30 TABLET | Refills: 5 | Status: SHIPPED | OUTPATIENT
Start: 2023-05-04 | End: 2024-01-12

## 2023-05-04 ASSESSMENT — ENCOUNTER SYMPTOMS
SHORTNESS OF BREATH: 0
HEMATURIA: 0
DYSURIA: 0
NAUSEA: 0
PALPITATIONS: 0
MYALGIAS: 0
PARESTHESIAS: 0
FEVER: 0
SORE THROAT: 0
FREQUENCY: 0
CONSTIPATION: 0
JOINT SWELLING: 0
HEMATOCHEZIA: 0
NERVOUS/ANXIOUS: 0
DIZZINESS: 0
COUGH: 0
ARTHRALGIAS: 0
ABDOMINAL PAIN: 0
HEADACHES: 0
HEARTBURN: 0
CHILLS: 0
DIARRHEA: 0
EYE PAIN: 0
WEAKNESS: 0

## 2023-05-04 ASSESSMENT — PAIN SCALES - GENERAL: PAINLEVEL: NO PAIN (0)

## 2023-05-04 NOTE — PROGRESS NOTES
SUBJECTIVE:   CC: Hi is an 27 year old who presents for preventative health visit.   Patient has been advised of split billing requirements and indicates understanding: Yes  Healthy Habits:     Getting at least 3 servings of Calcium per day:  Yes    Bi-annual eye exam:  NO    Dental care twice a year:  Yes    Sleep apnea or symptoms of sleep apnea:  None    Diet:  Low salt    Frequency of exercise:  2-3 days/week    Duration of exercise:  15-30 minutes    Taking medications regularly:  Yes    Medication side effects:  None    PHQ-2 Total Score: 0    Additional concerns today:  No    Comes in for annual exam today. Needs refills of Ambien. Has been using 1/2 tablet most nights. If he does not take the medication then will only sleep 3-4 hours. Ran out of medications on Sunday. Hx trazodone, not effective long term. Took melatonin past few days but did not help.       Today's PHQ-2 Score:       5/4/2023     9:41 AM   PHQ-2 ( 1999 [employer])   Q1: Little interest or pleasure in doing things 0   Q2: Feeling down, depressed or hopeless 0   PHQ-2 Score 0   Q1: Little interest or pleasure in doing things Not at all    Not at all   Q2: Feeling down, depressed or hopeless Not at all    Not at all   PHQ-2 Score 0    0           Social History     Tobacco Use     Smoking status: Never     Smokeless tobacco: Never   Vaping Use     Vaping status: Never Used   Substance Use Topics     Alcohol use: Yes     Comment: rare             5/4/2023     9:41 AM   Alcohol Use   Prescreen: >3 drinks/day or >7 drinks/week? No       Last PSA: No results found for: PSA    Reviewed orders with patient. Reviewed health maintenance and updated orders accordingly - Yes  BP Readings from Last 3 Encounters:   05/04/23 120/74   04/15/22 132/70   10/25/21 128/80    Wt Readings from Last 3 Encounters:   05/04/23 96 kg (211 lb 9.6 oz)   04/15/22 92.3 kg (203 lb 8 oz)   10/25/21 91 kg (200 lb 9.6 oz)                  Patient Active Problem List    Diagnosis     Calculus of kidney     Hypocitraturia     Ureteral stone with hydronephrosis     Chronic cholecystitis     Ureterolithiasis     Right ureteral stone     Pitted keratolysis     Past Surgical History:   Procedure Laterality Date     COMBINED CYSTOSCOPY, RETROGRADES, URETEROSCOPY, LASER HOLMIUM LITHOTRIPSY URETER(S), INSERT STENT Right 8/31/2020    Procedure: Right ureteroscopy with holmium laser lithotripsy and stent placement;  Surgeon: Veto Peralta MD;  Location: [facility]     COMBINED CYSTOSCOPY, URETEROSCOPY, LASER HOLMIUM LITHOTRIPSY URETER(S) Left 3/17/2019    Procedure: COMBINED CYSTOSCOPY, URETEROSCOPY, LASER HOLMIUM LITHOTRIPSY URETER(S)and stent placement;  Surgeon: Veto Peralta MD;  Location: [facility]     COMBINED CYSTOSCOPY, URETEROSCOPY, LASER HOLMIUM LITHOTRIPSY URETER(S) Left 11/17/2019    Procedure: COMBINED CYSTOSCOPY, URETEROSCOPY, LASER HOLMIUM LITHOTRIPSY URETER(S);  Surgeon: Veto Peralta MD;  Location:  OR     ESOPHAGOSCOPY, GASTROSCOPY, DUODENOSCOPY (EGD), COMBINED N/A 11/22/2019    Procedure: ESOPHAGOGASTRODUODENOSCOPY, WITH BIOPSY;  Surgeon: Arnie García MD;  Location:  OR     GRAFT SKIN FULL THICKNESS FROM EXTREMITY Right     right calf     LAPAROSCOPIC CHOLECYSTECTOMY N/A 11/30/2020    Procedure: CHOLECYSTECTOMY, LAPAROSCOPIC;  Surgeon: Arnie García MD;  Location:  OR     TONSILLECTOMY      No Comments Provided       Social History     Tobacco Use     Smoking status: Never     Smokeless tobacco: Never   Vaping Use     Vaping status: Never Used   Substance Use Topics     Alcohol use: Yes     Comment: rare     History reviewed. No pertinent family history.      Current Outpatient Medications   Medication Sig Dispense Refill     potassium citrate 15 MEQ (1620 MG) TBCR Take 2 tablets by mouth 2 times daily (with meals) 360 tablet 3     zolpidem (AMBIEN) 10 MG tablet Take 1 tablet (10 mg) by mouth nightly as needed for sleep 30 tablet 5     Allergies   Allergen  Reactions     Cefprozil Rash     Childhood Rxn       Reviewed and updated as needed this visit by clinical staff   Tobacco  Allergies  Meds  Problems  Med Hx  Surg Hx  Fam Hx  Soc   Hx        Reviewed and updated as needed this visit by Provider   Tobacco  Allergies  Meds  Problems  Med Hx  Surg Hx  Fam Hx         Past Medical History:   Diagnosis Date     Calculus of kidney 4/27/2019      Past Surgical History:   Procedure Laterality Date     COMBINED CYSTOSCOPY, RETROGRADES, URETEROSCOPY, LASER HOLMIUM LITHOTRIPSY URETER(S), INSERT STENT Right 8/31/2020    Procedure: Right ureteroscopy with holmium laser lithotripsy and stent placement;  Surgeon: Veot Peralta MD;  Location:  OR     COMBINED CYSTOSCOPY, URETEROSCOPY, LASER HOLMIUM LITHOTRIPSY URETER(S) Left 3/17/2019    Procedure: COMBINED CYSTOSCOPY, URETEROSCOPY, LASER HOLMIUM LITHOTRIPSY URETER(S)and stent placement;  Surgeon: Veto Peralta MD;  Location: [facility]     COMBINED CYSTOSCOPY, URETEROSCOPY, LASER HOLMIUM LITHOTRIPSY URETER(S) Left 11/17/2019    Procedure: COMBINED CYSTOSCOPY, URETEROSCOPY, LASER HOLMIUM LITHOTRIPSY URETER(S);  Surgeon: Veto Peralta MD;  Location:  OR     ESOPHAGOSCOPY, GASTROSCOPY, DUODENOSCOPY (EGD), COMBINED N/A 11/22/2019    Procedure: ESOPHAGOGASTRODUODENOSCOPY, WITH BIOPSY;  Surgeon: Arnie García MD;  Location:  OR     GRAFT SKIN FULL THICKNESS FROM EXTREMITY Right     right calf     LAPAROSCOPIC CHOLECYSTECTOMY N/A 11/30/2020    Procedure: CHOLECYSTECTOMY, LAPAROSCOPIC;  Surgeon: Arnie García MD;  Location:  OR     TONSILLECTOMY      No Comments Provided       Review of Systems   Constitutional: Negative for chills and fever.   HENT: Negative for congestion, ear pain, hearing loss and sore throat.    Eyes: Negative for pain and visual disturbance.   Respiratory: Negative for cough and shortness of breath.    Cardiovascular: Negative for chest pain, palpitations and peripheral edema.  "  Gastrointestinal: Negative for abdominal pain, constipation, diarrhea, heartburn, hematochezia and nausea.   Genitourinary: Negative for dysuria, frequency, genital sores, hematuria, impotence, penile discharge and urgency.   Musculoskeletal: Negative for arthralgias, joint swelling and myalgias.   Skin: Negative for rash.   Neurological: Negative for dizziness, weakness, headaches and paresthesias.   Psychiatric/Behavioral: Negative for mood changes. The patient is not nervous/anxious.          OBJECTIVE:   /74   Pulse 112   Temp 97.6  F (36.4  C)   Resp 16   Ht 1.727 m (5' 8\")   Wt 96 kg (211 lb 9.6 oz)   SpO2 97%   BMI 32.17 kg/m      Physical Exam  GENERAL: healthy, alert and no distress  EYES: Eyes grossly normal to inspection, PERRL and conjunctivae and sclerae normal  HENT: ear canals and TM's normal, nose and mouth without ulcers or lesions  NECK: no adenopathy, no asymmetry, masses, or scars and thyroid normal to palpation  RESP: lungs clear to auscultation - no rales, rhonchi or wheezes  CV: regular rate and rhythm, normal S1 S2, no S3 or S4, no murmur, click or rub, no peripheral edema and peripheral pulses strong  ABDOMEN: soft, nontender, no hepatosplenomegaly, no masses and bowel sounds normal  MS: no gross musculoskeletal defects noted, no edema  SKIN: no suspicious lesions or rashes  NEURO: Normal strength and tone, mentation intact and speech normal  PSYCH: mentation appears normal, affect normal/bright    Diagnostic Test Results:  Labs reviewed in Epic    ASSESSMENT/PLAN:       ICD-10-CM    1. Routine general medical examination at a health care facility  Z00.00       2. Primary insomnia  F51.01 zolpidem (AMBIEN) 10 MG tablet      3. Class 1 obesity without serious comorbidity with body mass index (BMI) of 32.0 to 32.9 in adult, unspecified obesity type  E66.9     Z68.32       Discussed use of Ambien, taking drug holidays, trials of other medications.  Melatonin and trazodone have " "not been effective in the past.  He is currently taking half a tablet most nights.  [place]  was reviewed, refilled medications x6 months, he is aware that he needs to come in to clinic or have a telephone visit in 6 months for refills and will need to be seen in office at least annually.    He is due for a tetanus vaccine, he would like to get this done but declines today.  We will schedule this at his convenience.    He is not due for any lab work today, I would recommend screening labs including cholesterol panel and others at his next annual physical.    Patient has been advised of split billing requirements and indicates understanding: Yes      COUNSELING:   Reviewed preventive health counseling, as reflected in patient instructions       Regular exercise       Healthy diet/nutrition       Vision screening      BMI:   Estimated body mass index is 32.17 kg/m  as calculated from the following:    Height as of this encounter: 1.727 m (5' 8\").    Weight as of this encounter: 96 kg (211 lb 9.6 oz).   Weight management plan: Discussed healthy diet and exercise guidelines      He reports that he has never smoked. He has never used smokeless tobacco.        JASMINE Mott [facility]  "

## 2023-05-04 NOTE — NURSING NOTE
Patient presents today for annual physical and refills.    Medication Reconciliation Complete    Madelin Whyte LPN  5/4/2023 9:47 AM

## 2023-07-19 ENCOUNTER — OFFICE VISIT (OUTPATIENT)
Dept: FAMILY MEDICINE | Facility: OTHER | Age: 28
End: 2023-07-19
Payer: COMMERCIAL

## 2023-07-19 VITALS
RESPIRATION RATE: 16 BRPM | SYSTOLIC BLOOD PRESSURE: 116 MMHG | WEIGHT: 208.6 LBS | OXYGEN SATURATION: 97 % | TEMPERATURE: 98 F | BODY MASS INDEX: 31.72 KG/M2 | DIASTOLIC BLOOD PRESSURE: 80 MMHG | HEART RATE: 109 BPM

## 2023-07-19 DIAGNOSIS — H65.193 ACUTE EFFUSION OF BOTH MIDDLE EARS: Primary | ICD-10-CM

## 2023-07-19 PROCEDURE — 99213 OFFICE O/P EST LOW 20 MIN: CPT | Performed by: STUDENT IN AN ORGANIZED HEALTH CARE EDUCATION/TRAINING PROGRAM

## 2023-07-19 ASSESSMENT — PAIN SCALES - GENERAL: PAINLEVEL: MODERATE PAIN (4)

## 2023-07-19 NOTE — PROGRESS NOTES
"  Assessment & Plan     (H65.193) Acute effusion of both middle ears  (primary encounter diagnosis)  Comment: Effusion of ears bilaterally.  No evidence of otitis media on exam today.  Dizziness most likely related to the effusion.  It is starting to improve.    Plan: Discussed tools to use to try and help with the effusion.  Discussed that this may simply take some time.  It is reasonable to expect up to 8 to 12 weeks.  Discussed that if symptoms worsen or change, the effusion may lead to infection.  Follow-up if developing worsening symptoms.    I explained my diagnostic considerations. Discussed risks and benefits of treatments offered. Patient agrees with treatment plan. If symptoms worsen or change, patient should return or go to the ER. Follow up with primary care provider as needed.      Lovely Erwin PA-C  [facility]      Subjective   Hi is a 28 year old, presenting for the following health issues:  Otalgia (Ear pain in right and left )      HPI     Patient presents today with concerns of ear pain.  States it started a few days ago, more so in the right ear.  States it is feeling plugged as well as painful.  States a couple of days ago he noted that he was also feeling \"dizzy\".  He feels like the room is spinning around him.  He states that is since improved but has not completely resolved.  He has also had a mild cough and congestion.  This remains persistent for him.  He did have a sore throat early on that did resolve spontaneously.    He does endorse history of ear infections needing oral antibiotics especially as a child.  Once he got his tonsils removed, his ear infections significantly decreased.      Review of Systems   Constitutional, HEENT, cardiovascular, pulmonary, gi and gu systems are negative, except as otherwise noted.      Objective    /80 (BP Location: Left arm, Patient Position: Sitting, Cuff Size: Adult Regular)   Pulse 109   Temp 98  F (36.7  C) " (Temporal)   Resp 16   Wt 94.6 kg (208 lb 9.6 oz)   SpO2 97%   BMI 31.72 kg/m    Body mass index is 31.72 kg/m .     Physical Exam   GENERAL: healthy, alert and no distress  HEENT: Normocephalic, atraumatic, pupils equal and reactive to light, conjunctiva clear, ear canals without erythema, edema, cerumen, tympanic membranes slightly dulled, slightly protruding without any erythema or suppurative fluid, nares clear, throat without tonsils visualized, slightly erythematous without edema or exudates  NECK: no adenopathy, no asymmetry, masses  RESP: lungs clear to auscultation - no rales, rhonchi or wheezes  CV: regular rate and rhythm, normal S1 S2  MS: no gross musculoskeletal defects noted, no edema

## 2023-07-19 NOTE — NURSING NOTE
"Chief Complaint   Patient presents with     Otalgia     Ear pain in right and left      Ear pain started on Monday. Patient reports he has been swimming a lot. Some dizziness reported.   Initial /80 (BP Location: Left arm, Patient Position: Sitting, Cuff Size: Adult Regular)   Pulse 109   Temp 98  F (36.7  C) (Temporal)   Resp 16   Wt 94.6 kg (208 lb 9.6 oz)   SpO2 97%   BMI 31.72 kg/m   Estimated body mass index is 31.72 kg/m  as calculated from the following:    Height as of 5/4/23: 1.727 m (5' 8\").    Weight as of this encounter: 94.6 kg (208 lb 9.6 oz).  Medication Reconciliation: complete      FOOD SECURITY SCREENING QUESTIONS:    The next two questions are to help us understand your food security.  If you are feeling you need any assistance in this area, we have resources available to support you today.    Hunger Vital Signs:  Within the past 12 months we worried whether our food would run out before we got money to buy more. Never  Within the past 12 months the food we bought just didn't last and we didn't have money to get more. Never          Becki Betancur RN.......7/19/20239:30 AM   "

## 2023-07-19 NOTE — PATIENT INSTRUCTIONS
Ear Effusions    Utilize meclizine (Antevert) for dizziness.     Ibuprofen/tylenol.    Decongestants such as Sudafed.     Nasal decongestants such as Flonase of Afrin (x 3 days)    Time.    Valvalsa maneuver to help move fluid.  Warm packs.  Chewing gum.    Follow up in 8-12 weeks if symptoms persist.  Return to [facility] if symptoms worsen.

## 2024-01-11 ENCOUNTER — OFFICE VISIT (OUTPATIENT)
Dept: FAMILY MEDICINE | Facility: OTHER | Age: 29
End: 2024-01-11
Payer: COMMERCIAL

## 2024-01-11 VITALS
RESPIRATION RATE: 14 BRPM | WEIGHT: 215.8 LBS | HEART RATE: 126 BPM | DIASTOLIC BLOOD PRESSURE: 84 MMHG | BODY MASS INDEX: 32.71 KG/M2 | OXYGEN SATURATION: 97 % | TEMPERATURE: 98.2 F | SYSTOLIC BLOOD PRESSURE: 136 MMHG | HEIGHT: 68 IN

## 2024-01-11 DIAGNOSIS — R21 RASH: Primary | ICD-10-CM

## 2024-01-11 PROCEDURE — 99213 OFFICE O/P EST LOW 20 MIN: CPT

## 2024-01-11 RX ORDER — PREDNISONE 20 MG/1
TABLET ORAL
Qty: 20 TABLET | Refills: 0 | Status: SHIPPED | OUTPATIENT
Start: 2024-01-11

## 2024-01-11 ASSESSMENT — PAIN SCALES - GENERAL: PAINLEVEL: NO PAIN (0)

## 2024-01-11 NOTE — NURSING NOTE
"Chief Complaint   Patient presents with    Hives     Happening for 3-4 days; he will get redness and little bumps on hands, neck etc. Last for 10- 15 minutes. Takes Benadryl. Redness/ bumps get better until Benadryl wears off; no new soaps, lotions, detergent, etc.        Initial /84   Pulse (!) 126   Temp 98.2  F (36.8  C) (Tympanic)   Resp 14   Ht 1.727 m (5' 8\")   Wt 97.9 kg (215 lb 12.8 oz)   SpO2 97%   BMI 32.81 kg/m   Estimated body mass index is 32.81 kg/m  as calculated from the following:    Height as of this encounter: 1.727 m (5' 8\").    Weight as of this encounter: 97.9 kg (215 lb 12.8 oz).  Medication Review: complete    The next two questions are to help us understand your food security.  If you are feeling you need any assistance in this area, we have resources available to support you today.          1/11/2024   SDOH- Food Insecurity   Within the past 12 months, did you worry that your food would run out before you got money to buy more? N   Within the past 12 months, did the food you bought just not last and you didn t have money to get more? N         Health Care Directive:  Patient does not have a Health Care Directive or Living Will: Discussed advance care planning with patient; however, patient declined at this time.    Gabriella Thomson CMA      "

## 2024-01-11 NOTE — PROGRESS NOTES
ASSESSMENT/PLAN:    I have reviewed the nursing notes.  I have reviewed the findings, diagnosis, plan and need for follow up with the patient.    1. Rash  - predniSONE (DELTASONE) 20 MG tablet; Take 3 tabs by mouth daily x 3 days, then 2 tabs daily x 3 days, then 1 tab daily x 3 days, then 1/2 tab daily x 3 days.  Dispense: 20 tablet; Refill: 0    Patient presents with a rash that started 3 to 4 days ago that is intermittent.  Patient denies any changes to medications, foods, or soaps.  He denies any increased stress.  Discussed with patient that the rash could possibly be a formal contact of otitis.  Advised patient to keep a journal of when he experiences the rash, how long it lasts, and what he was recently in contact with.  Due to the rash being more widespread throughout patient's body and the Benadryl only providing temporary relief will treat patient with a prednisone taper.  Advised patient take this medication in the morning with food and to avoid any NSAIDs.  Discussed with patient that he can also continue taking an over-the-counter antihistamine such as Benadryl but may also try Claritin or Zyrtec as well.  Discussed that cool compresses can be used on areas that have increased pruritus.    Discussed warning signs/symptoms indicative of need to f/u    Follow up if symptoms persist or worsen or concerns    I explained my diagnostic considerations and recommendations to the patient, who voiced understanding and agreement with the treatment plan. All questions were answered. We discussed potential side effects of any prescribed or recommended therapies, as well as expectations for response to treatments.    JASMINE Browning CNP  1/11/2024  2:17 PM    HPI:    Hi Kinney is a 28 year old male  who [facility] for concerns of rash    rash, x 3-4 days    Description:   Location: chest, palms of hands, and face  Character: raised, red  Itching (Pruritis): mild - severe  itchiness  Description: Spreading    Progression of Symptoms:  worsening but intermittent    Accompanying Signs & Symptoms:  Fever: no  Body aches or joint pain: no  Sore throat symptoms: no  Recent cold symptoms: no    History:   Previous similar rash: no    Precipitating factors:   Similar rash in past: no  New exposures: None   Recent travel: no  Exacerbating Factors: none    Therapies Tried and outcome: Benadryl - helps but rash returns once it wears off    Exposure History: none known    Additional symptoms: none    Recent medication or other changes: none    PCP: Vero García    Past Medical History:   Diagnosis Date    Calculus of kidney 4/27/2019     Past Surgical History:   Procedure Laterality Date    COMBINED CYSTOSCOPY, RETROGRADES, URETEROSCOPY, LASER HOLMIUM LITHOTRIPSY URETER(S), INSERT STENT Right 8/31/2020    Procedure: Right ureteroscopy with holmium laser lithotripsy and stent placement;  Surgeon: Veto Peralta MD;  Location:  OR    COMBINED CYSTOSCOPY, URETEROSCOPY, LASER HOLMIUM LITHOTRIPSY URETER(S) Left 3/17/2019    Procedure: COMBINED CYSTOSCOPY, URETEROSCOPY, LASER HOLMIUM LITHOTRIPSY URETER(S)and stent placement;  Surgeon: Veto Peralta MD;  Location:  OR    COMBINED CYSTOSCOPY, URETEROSCOPY, LASER HOLMIUM LITHOTRIPSY URETER(S) Left 11/17/2019    Procedure: COMBINED CYSTOSCOPY, URETEROSCOPY, LASER HOLMIUM LITHOTRIPSY URETER(S);  Surgeon: Veto Peralta MD;  Location:  OR    ESOPHAGOSCOPY, GASTROSCOPY, DUODENOSCOPY (EGD), COMBINED N/A 11/22/2019    Procedure: ESOPHAGOGASTRODUODENOSCOPY, WITH BIOPSY;  Surgeon: Arnie García MD;  Location:  OR    GRAFT SKIN FULL THICKNESS FROM EXTREMITY Right     right calf    LAPAROSCOPIC CHOLECYSTECTOMY N/A 11/30/2020    Procedure: CHOLECYSTECTOMY, LAPAROSCOPIC;  Surgeon: Arnie García MD;  Location:  OR    TONSILLECTOMY      No Comments Provided     Social History     Tobacco Use    Smoking status: Never    Smokeless tobacco: Never  "  Substance Use Topics    Alcohol use: Yes     Comment: rare     Current Outpatient Medications   Medication Sig Dispense Refill    potassium citrate 15 MEQ (1620 MG) TBCR Take 2 tablets by mouth 2 times daily (with meals) (Patient not taking: Reported on 1/11/2024) 360 tablet 3    zolpidem (AMBIEN) 10 MG tablet Take 1 tablet (10 mg) by mouth nightly as needed for sleep (Patient not taking: Reported on 1/11/2024) 30 tablet 5     Allergies   Allergen Reactions    Cefprozil Rash     Childhood Rxn     Past medical history, past surgical history, current medications and allergies reviewed and accurate to the best of my knowledge.      ROS:  Refer to HPI    /84   Pulse (!) 126   Temp 98.2  F (36.8  C) (Tympanic)   Resp 14   Ht 1.727 m (5' 8\")   Wt 97.9 kg (215 lb 12.8 oz)   SpO2 97%   BMI 32.81 kg/m      EXAM:  General Appearance: Well appearing 28 year old male, appropriate appearance for age. No acute distress   Ears: Left TM intact, translucent with bony landmarks appreciated, no erythema, no effusion, no bulging, no purulence.  Right TM intact, translucent with bony landmarks appreciated, no erythema, no effusion, no bulging, no purulence.  Left auditory canal clear.  Right auditory canal clear.  Normal external ears, non tender.  Eyes: conjunctivae normal without erythema or irritation, corneas clear, no drainage or crusting, no eyelid swelling, pupils equal   Oropharynx: moist mucous membranes, posterior pharynx without erythema, tonsils symmetric and 1+, no erythema, no exudates or petechiae, no post nasal drip seen, no trismus, voice clear.    Nose:  Bilateral nares: no erythema, no edema, no drainage or congestion   Neck: supple without adenopathy  Respiratory: normal chest wall and respirations.  Normal effort.  Clear to auscultation bilaterally, no wheezing, crackles or rhonchi.  No increased work of breathing.  No cough appreciated.  Cardiac: RRR with no murmurs  Musculoskeletal:  Equal movement " of bilateral upper extremities.  Equal movement of bilateral lower extremities.  Normal gait.    Dermatological: mild maculopapular rash on palms of hands  Neuro: Alert and oriented to person, place, and time.    Psychological: normal affect, alert, oriented, and pleasant.

## 2024-01-12 ENCOUNTER — MYC REFILL (OUTPATIENT)
Dept: FAMILY MEDICINE | Facility: OTHER | Age: 29
End: 2024-01-12
Payer: COMMERCIAL

## 2024-01-12 DIAGNOSIS — F51.01 PRIMARY INSOMNIA: ICD-10-CM

## 2024-01-15 RX ORDER — ZOLPIDEM TARTRATE 10 MG/1
10 TABLET ORAL
Qty: 30 TABLET | Refills: 0 | Status: SHIPPED | OUTPATIENT
Start: 2024-01-15

## 2024-01-15 NOTE — TELEPHONE ENCOUNTER
Requested Prescriptions   Pending Prescriptions Disp Refills    zolpidem (AMBIEN) 10 MG tablet 30 tablet 5     Sig: Take 1 tablet (10 mg) by mouth nightly as needed for sleep       There is no refill protocol information for this order        Last Prescription Date:   5/4/23  Last Fill Qty/Refills:         30, R-5    Last Office Visit:              5/4/23   Future Office visit:           None    Per LOV note:  Discussed use of Ambien, taking drug holidays, trials of other medications. Melatonin and trazodone have not been effective in the past. He is currently taking half a tablet most nights. [place] [facility] was reviewed, refilled medications x6 months, he is aware that he needs to come in to clinic or have a telephone visit in 6 months for refills and will need to be seen in office at least annually.     Unable to complete prescription refill per RN Medication Refill Policy.     Lou Schmid RN .............. 1/15/2024  9:12 AM

## 2024-01-15 NOTE — TELEPHONE ENCOUNTER
Rx for ambien sent in #30-as this is controlled, he will need to be seen for further refills. JASMINE Mott CNP on 1/15/2024 at 9:28 AM

## 2024-07-13 ENCOUNTER — HEALTH MAINTENANCE LETTER (OUTPATIENT)
Age: 29
End: 2024-07-13

## 2024-07-22 ENCOUNTER — OFFICE VISIT (OUTPATIENT)
Dept: FAMILY MEDICINE | Facility: OTHER | Age: 29
End: 2024-07-22
Payer: COMMERCIAL

## 2024-07-22 VITALS
TEMPERATURE: 97.7 F | OXYGEN SATURATION: 98 % | HEIGHT: 68 IN | HEART RATE: 68 BPM | RESPIRATION RATE: 16 BRPM | BODY MASS INDEX: 33.34 KG/M2 | DIASTOLIC BLOOD PRESSURE: 76 MMHG | SYSTOLIC BLOOD PRESSURE: 116 MMHG | WEIGHT: 220 LBS

## 2024-07-22 DIAGNOSIS — J06.9 UPPER RESPIRATORY TRACT INFECTION, UNSPECIFIED TYPE: ICD-10-CM

## 2024-07-22 DIAGNOSIS — R09.81 NASAL CONGESTION: ICD-10-CM

## 2024-07-22 DIAGNOSIS — J02.9 SORE THROAT: Primary | ICD-10-CM

## 2024-07-22 LAB
FLUAV RNA SPEC QL NAA+PROBE: NEGATIVE
FLUBV RNA RESP QL NAA+PROBE: NEGATIVE
GROUP A STREP BY PCR: NOT DETECTED
RSV RNA SPEC NAA+PROBE: NEGATIVE
SARS-COV-2 RNA RESP QL NAA+PROBE: NEGATIVE

## 2024-07-22 PROCEDURE — 87637 SARSCOV2&INF A&B&RSV AMP PRB: CPT | Mod: ZL | Performed by: NURSE PRACTITIONER

## 2024-07-22 PROCEDURE — 87651 STREP A DNA AMP PROBE: CPT | Mod: ZL | Performed by: NURSE PRACTITIONER

## 2024-07-22 PROCEDURE — 99213 OFFICE O/P EST LOW 20 MIN: CPT | Performed by: NURSE PRACTITIONER

## 2024-07-22 RX ORDER — FLUTICASONE PROPIONATE 50 MCG
1 SPRAY, SUSPENSION (ML) NASAL DAILY
Qty: 9.9 ML | Refills: 0 | Status: SHIPPED | OUTPATIENT
Start: 2024-07-22

## 2024-07-22 ASSESSMENT — ENCOUNTER SYMPTOMS
EYES NEGATIVE: 1
NEUROLOGICAL NEGATIVE: 1
RESPIRATORY NEGATIVE: 1
GASTROINTESTINAL NEGATIVE: 1
CONSTITUTIONAL NEGATIVE: 1
CARDIOVASCULAR NEGATIVE: 1
MUSCULOSKELETAL NEGATIVE: 1
SORE THROAT: 1

## 2024-07-22 ASSESSMENT — PAIN SCALES - GENERAL: PAINLEVEL: MODERATE PAIN (4)

## 2024-07-22 NOTE — PROGRESS NOTES
Hi Kinney  1995    ASSESSMENT/PLAN      1. Sore throat  2. Nasal congestion  3.  Upper respiratory tract infection, unspecified    Presents to [facility] clinic with sore throat, runny nose and congestion for one week.  No fever or chills.  Strep, influenza, RSV, COVID-negative. Patient's vitals are stable and he appears nontoxic.  Discussed with patient to try over-the-counter medications including Claritin, Zyrtec, Flonase, Sudafed, DayQuil or NyQuil for symptom management    - Group A Streptococcus PCR Throat Swab - negative  - Symptomatic Influenza A/B, RSV, & SARS-CoV2 PCR (COVID-19) Nose: Negative    - fluticasone (FLONASE) 50 MCG/ACT nasal spray; Spray 1 spray into both nostrils daily  Dispense: 9.9 mL; Refill: 0     - Discussed with patient that symptoms and exam are consistent with viral illness.    - No clinical indications for antibiotic treatment at this time.  - Symptomatic treatment - Encouraged fluids, salt water gargles, honey, humidifier, saline nasal spray, lozenges, tea, soup, smoothies, popsicles, topical vapor rub, rest, etc   - May use over-the-counter Tylenol or ibuprofen PRN  - Follow up as needed for new or worsening symptoms          *Explanation of diagnosis, treatment options and risk and benefits of medications reviewed with patient. Patient agrees with plan of care.  *All questions were answered.    *Red flags symptoms were discussed and patient was advised when they should return for reevaluation or for prompt emergency evaluation.   *Patient was given verbal and written instructions on plan of care. Instructions were printed or are available on [employer] [facility] electronic AVS.   *We discussed potential side effects of any prescribed or recommended therapies, as well as expectations for response to treatments.  *Patient discharged in stable condition    Tess Lind CNP  [facility]    SUBJECTIVE  CHIEF COMPLAINT/ REASON FOR VISIT  Patient presents  "with:  Pharyngitis  Sinus Problem     HISTORY OF PRESENT ILLNESS  Hi Kinney is a pleasant 29 year old male presents to [facility] clinic today with 1+ week of sore throat with sinus congestion, runny nose.  No fever or chills.  No cough.  No history of seasonal allergies.  No abdominal pain, nausea or rash.  No known exposure to specific illness.      I have reviewed the nursing notes.  I have reviewed allergies, medication list, problem list, and past medical history.    REVIEW OF SYSTEMS  Review of Systems   Constitutional: Negative.    HENT:  Positive for congestion and sore throat.    Eyes: Negative.    Respiratory: Negative.     Cardiovascular: Negative.    Gastrointestinal: Negative.    Genitourinary: Negative.    Musculoskeletal: Negative.    Skin: Negative.    Neurological: Negative.    All other systems reviewed and are negative.       VITAL SIGNS  Vitals:    07/22/24 1359   BP: 116/76   BP Location: Right arm   Patient Position: Sitting   Cuff Size: Adult Large   Pulse: 68   Resp: 16   Temp: 97.7  F (36.5  C)   TempSrc: Tympanic   SpO2: 98%   Weight: 99.8 kg (220 lb)   Height: 1.715 m (5' 7.5\")   HC: 16 cm (6.3\")      Body mass index is 33.95 kg/m .      OBJECTIVE  PHYSICAL EXAM  Physical Exam  Vitals and nursing note reviewed.   Constitutional:       Appearance: Normal appearance.   HENT:      Head: Normocephalic.      Nose: Nose normal.      Mouth/Throat:      Mouth: Mucous membranes are moist.      Pharynx: Posterior oropharyngeal erythema present.   Eyes:      Pupils: Pupils are equal, round, and reactive to light.   Cardiovascular:      Rate and Rhythm: Normal rate and regular rhythm.      Pulses: Normal pulses.      Heart sounds: Normal heart sounds.   Pulmonary:      Effort: Pulmonary effort is normal.      Breath sounds: Normal breath sounds.   Abdominal:      Palpations: Abdomen is soft.   Musculoskeletal:         General: Normal range of motion.      Cervical back: Normal range of motion. "   Skin:     General: Skin is warm and dry.      Capillary Refill: Capillary refill takes less than 2 seconds.   Neurological:      General: No focal deficit present.      Mental Status: He is alert.            DIAGNOSTICS  Results for orders placed or performed in visit on 07/22/24   Symptomatic Influenza A/B, RSV, & SARS-CoV2 PCR (COVID-19) Nose     Status: Normal    Specimen: Nose; Swab   Result Value Ref Range    Influenza A PCR Negative Negative    Influenza B PCR Negative Negative    RSV PCR Negative Negative    SARS CoV2 PCR Negative Negative    Narrative    Testing was performed using the Xpert Xpress CoV2/Flu/RSV Assay on the [employer]. This test should be ordered for the detection of SARS-CoV-2, influenza, and RSV viruses in individuals who meet clinical and/or epidemiological criteria. Test performance is unknown in asymptomatic patients. This test is for in vitro diagnostic use under the FDA EUA for laboratories certified under CLIA to perform high or moderate complexity testing. This test has not been FDA cleared or approved. A negative result does not rule out the presence of PCR inhibitors in the specimen or target RNA in concentration below the limit of detection for the assay. If only one viral target is positive but coinfection with multiple targets is suspected, the sample should be re-tested with another FDA cleared, approved, or authorized test, if coinfection would change clinical management. This test was validated by the [facility]. These laboratories are certified under the Clinical Laboratory Improvement Amendments of 1988 (CLIA-88) as qualified to perform high complexity laboratory testing.   Group A Streptococcus PCR Throat Swab     Status: Normal    Specimen: Throat; Swab   Result Value Ref Range    Group A strep by PCR Not Detected Not Detected    Narrative    The Xpert Xpress Strep A test, performed on the [employer], is a rapid,  qualitative in vitro diagnostic test for the detection of Streptococcus pyogenes (Group A ß-hemolytic Streptococcus, Strep A) in throat swab specimens from patients with signs and symptoms of pharyngitis. The Xpert Xpress Strep A test can be used as an aid in the diagnosis of Group A Streptococcal pharyngitis. The assay is not intended to monitor treatment for Group A Streptococcus infections. The Xpert Xpress Strep A test utilizes an automated real-time polymerase chain reaction (PCR) to detect Streptococcus pyogenes DNA.

## 2024-07-22 NOTE — NURSING NOTE
Pt here for a sore throat and runny nose for a week.  Pt woke up today feeling worse.  Alexandra Cazares CMA (AAMA)......................7/22/2024  1:57 PM       Medication Reconciliation: complete    Alexandra Cazares CMA  7/22/2024 1:57 PM      FOOD SECURITY SCREENING QUESTIONS:    The next two questions are to help us understand your food security.  If you are feeling you need any assistance in this area, we have resources available to support you today.    Hunger Vital Signs:  Within the past 12 months we worried whether our food would run out before we got money to buy more. Never  Within the past 12 months the food we bought just didn't last and we didn't have money to get more. Never  Alexandra Cazares CMA,LPN on 7/22/2024 at 1:57 PM

## 2025-07-19 ENCOUNTER — HEALTH MAINTENANCE LETTER (OUTPATIENT)
Age: 30
End: 2025-07-19

## (undated) DEVICE — SOL WATER 1500ML

## (undated) DEVICE — GUIDEWIRE SENSOR DUAL FLEX STR 0.035"X150CM M0066703080

## (undated) DEVICE — LASER FIBER HOLMIUM FLEXIVA TRACTIP 200UM M0068403960

## (undated) DEVICE — ENDO KIT COMPLIANCE DYKENDOCMPLY

## (undated) DEVICE — SYR 50ML LL W/O NDL 309653

## (undated) DEVICE — GUIDEWIRE AMPLATZ SUPER STIFF .038"X145CM M0066401061

## (undated) DEVICE — PACK CYSTO SBA15CSFCA

## (undated) DEVICE — Device

## (undated) DEVICE — CATH COUDE 18FR TIEMANN LATEX 120618

## (undated) DEVICE — SLEEVE COMPRESSION SCD KNEE MED 74022

## (undated) DEVICE — CATH INTERMITTENT CLEAN-CATH 16FR 16" VINYL LF 421716

## (undated) DEVICE — SU VICRYL 0 UR-6 27" J603H

## (undated) DEVICE — SYR 10ML LL W/O NDL 302995

## (undated) DEVICE — SHEATH URETERAL ACCESS NAVIGATOR HD 11/13FRX46CM M0062502230

## (undated) DEVICE — GLOVE PROTEXIS POWDER FREE SMT 8.5 2D72PT85X

## (undated) DEVICE — PAD CHUX UNDERPAD 30X36" P3036C

## (undated) DEVICE — COVER LIGHT HANDLE LT-F02

## (undated) DEVICE — PACK LAPAROSCOPY LF SBA15LPFCA

## (undated) DEVICE — ENDO TROCAR SLEEVE KII 5X100MM CTR03

## (undated) DEVICE — ENDO FORCEP ENDOJAW BIOPSY 2.8MMX230CM FB-220U

## (undated) DEVICE — BLADE CLIPPER 4406

## (undated) DEVICE — ENDO SCOPE WARMER DUAL LAP TM500D

## (undated) DEVICE — ENDO TROCAR FIRST ENTRY KII FIOS Z-THRD 12X100MM CTF73

## (undated) DEVICE — TUOGHY BORST ADAPTER WITH SIDE ARM

## (undated) DEVICE — PREP CHLORAPREP 26ML TINTED ORANGE  260815

## (undated) DEVICE — ESU HOLDER LAP INST DISP PURPLE LONG 330MM H-PRO-330

## (undated) DEVICE — ESU CORD MONOPOLAR 10'  E0510

## (undated) DEVICE — BASKET NITINOL TIPLESS HALO  1.5FRX120CM 554120

## (undated) DEVICE — GLOVE PROTEXIS POWDER FREE SMT 7.5  2D72PT75X

## (undated) DEVICE — TUBING INSUFFLATOR W/FILTER OLYMPUS WA95005A

## (undated) DEVICE — TUBING SUCTION 10'X3/16" N510

## (undated) DEVICE — CATH URETERAL 5FRX70CM OPEN END FLEX TIP G14521

## (undated) DEVICE — ESU GROUND PAD ADULT W/CORD E7507

## (undated) DEVICE — CLIP APPLIER ENDO ROTATING 10MM MED/LG ER320

## (undated) DEVICE — SUCTION MANIFOLD NEPTUNE 2 SYS 4 PORT 0702-020-000

## (undated) DEVICE — ENDO TROCAR SLEEVE KII Z-THREADED 05X100MM CTS02

## (undated) DEVICE — GLOVE BIOGEL INDICATOR 7.5 LF 41675

## (undated) DEVICE — PUMP SYSTEM SINGLE ACTION M0067201000

## (undated) DEVICE — ENDO BITE BLOCK 60 MAXI LF 00712804

## (undated) DEVICE — VERRES NEEDLE 120MM DISPOSABLE 12/BX

## (undated) DEVICE — SUCTION STRYKERFLOW II 250-070-500

## (undated) DEVICE — ENDO POUCH UNIV RETRIEVAL SYSTEM INZII 10MM CD001

## (undated) DEVICE — SU MONOCRYL 4-0 PS-2 27" UND Y426H

## (undated) RX ORDER — KETOROLAC TROMETHAMINE 30 MG/ML
INJECTION, SOLUTION INTRAMUSCULAR; INTRAVENOUS
Status: DISPENSED
Start: 2019-03-17

## (undated) RX ORDER — ALUMINA, MAGNESIA, AND SIMETHICONE 2400; 2400; 240 MG/30ML; MG/30ML; MG/30ML
SUSPENSION ORAL
Status: DISPENSED
Start: 2019-07-03

## (undated) RX ORDER — FENTANYL CITRATE 50 UG/ML
INJECTION, SOLUTION INTRAMUSCULAR; INTRAVENOUS
Status: DISPENSED
Start: 2020-11-30

## (undated) RX ORDER — PANTOPRAZOLE SODIUM 40 MG/10ML
INJECTION, POWDER, LYOPHILIZED, FOR SOLUTION INTRAVENOUS
Status: DISPENSED
Start: 2019-11-21

## (undated) RX ORDER — GLYCOPYRROLATE 0.2 MG/ML
INJECTION, SOLUTION INTRAMUSCULAR; INTRAVENOUS
Status: DISPENSED
Start: 2020-11-30

## (undated) RX ORDER — ONDANSETRON 2 MG/ML
INJECTION INTRAMUSCULAR; INTRAVENOUS
Status: DISPENSED
Start: 2020-08-31

## (undated) RX ORDER — KETAMINE HYDROCHLORIDE 50 MG/ML
INJECTION, SOLUTION INTRAMUSCULAR; INTRAVENOUS
Status: DISPENSED
Start: 2019-11-22

## (undated) RX ORDER — LIDOCAINE HYDROCHLORIDE 20 MG/ML
INJECTION, SOLUTION EPIDURAL; INFILTRATION; INTRACAUDAL; PERINEURAL
Status: DISPENSED
Start: 2020-08-31

## (undated) RX ORDER — CIPROFLOXACIN 500 MG/1
TABLET, FILM COATED ORAL
Status: DISPENSED
Start: 2019-03-25

## (undated) RX ORDER — ONDANSETRON 2 MG/ML
INJECTION INTRAMUSCULAR; INTRAVENOUS
Status: DISPENSED
Start: 2020-11-30

## (undated) RX ORDER — FENTANYL CITRATE 50 UG/ML
INJECTION, SOLUTION INTRAMUSCULAR; INTRAVENOUS
Status: DISPENSED
Start: 2019-11-17

## (undated) RX ORDER — KETOROLAC TROMETHAMINE 15 MG/ML
INJECTION, SOLUTION INTRAMUSCULAR; INTRAVENOUS
Status: DISPENSED
Start: 2020-08-31

## (undated) RX ORDER — PROPOFOL 10 MG/ML
INJECTION, EMULSION INTRAVENOUS
Status: DISPENSED
Start: 2019-03-17

## (undated) RX ORDER — OXYCODONE HYDROCHLORIDE 5 MG/1
TABLET ORAL
Status: DISPENSED
Start: 2021-05-25

## (undated) RX ORDER — PROPOFOL 10 MG/ML
INJECTION, EMULSION INTRAVENOUS
Status: DISPENSED
Start: 2020-11-30

## (undated) RX ORDER — HALOPERIDOL 5 MG/ML
INJECTION INTRAMUSCULAR
Status: DISPENSED
Start: 2019-03-17

## (undated) RX ORDER — ACETAMINOPHEN 10 MG/ML
INJECTION, SOLUTION INTRAVENOUS
Status: DISPENSED
Start: 2019-11-17

## (undated) RX ORDER — LIDOCAINE HYDROCHLORIDE 20 MG/ML
SOLUTION OROPHARYNGEAL
Status: DISPENSED
Start: 2019-07-03

## (undated) RX ORDER — SODIUM CHLORIDE, SODIUM LACTATE, POTASSIUM CHLORIDE, CALCIUM CHLORIDE 600; 310; 30; 20 MG/100ML; MG/100ML; MG/100ML; MG/100ML
INJECTION, SOLUTION INTRAVENOUS
Status: DISPENSED
Start: 2019-11-17

## (undated) RX ORDER — SODIUM CHLORIDE 9 MG/ML
INJECTION, SOLUTION INTRAVENOUS
Status: DISPENSED
Start: 2019-11-21

## (undated) RX ORDER — SCOLOPAMINE TRANSDERMAL SYSTEM 1 MG/1
PATCH, EXTENDED RELEASE TRANSDERMAL
Status: DISPENSED
Start: 2020-11-30

## (undated) RX ORDER — BACITRACIN ZINC 500 [USP'U]/G
OINTMENT TOPICAL
Status: DISPENSED
Start: 2019-03-17

## (undated) RX ORDER — DEXAMETHASONE SODIUM PHOSPHATE 4 MG/ML
INJECTION, SOLUTION INTRA-ARTICULAR; INTRALESIONAL; INTRAMUSCULAR; INTRAVENOUS; SOFT TISSUE
Status: DISPENSED
Start: 2019-11-17

## (undated) RX ORDER — KETOROLAC TROMETHAMINE 15 MG/ML
INJECTION, SOLUTION INTRAMUSCULAR; INTRAVENOUS
Status: DISPENSED
Start: 2019-11-17

## (undated) RX ORDER — SODIUM CHLORIDE 9 MG/ML
INJECTION, SOLUTION INTRAVENOUS
Status: DISPENSED
Start: 2021-06-15

## (undated) RX ORDER — SCOLOPAMINE TRANSDERMAL SYSTEM 1 MG/1
PATCH, EXTENDED RELEASE TRANSDERMAL
Status: DISPENSED
Start: 2019-03-17

## (undated) RX ORDER — METOCLOPRAMIDE HYDROCHLORIDE 5 MG/ML
INJECTION INTRAMUSCULAR; INTRAVENOUS
Status: DISPENSED
Start: 2020-08-31

## (undated) RX ORDER — LIDOCAINE HYDROCHLORIDE 20 MG/ML
INJECTION, SOLUTION EPIDURAL; INFILTRATION; INTRACAUDAL; PERINEURAL
Status: DISPENSED
Start: 2019-11-22

## (undated) RX ORDER — LIDOCAINE HYDROCHLORIDE 10 MG/ML
INJECTION, SOLUTION EPIDURAL; INFILTRATION; INTRACAUDAL; PERINEURAL
Status: DISPENSED
Start: 2020-11-30

## (undated) RX ORDER — CIPROFLOXACIN 2 MG/ML
INJECTION, SOLUTION INTRAVENOUS
Status: DISPENSED
Start: 2020-08-31

## (undated) RX ORDER — ACETAMINOPHEN 500 MG
TABLET ORAL
Status: DISPENSED
Start: 2019-11-21

## (undated) RX ORDER — PROPOFOL 10 MG/ML
INJECTION, EMULSION INTRAVENOUS
Status: DISPENSED
Start: 2019-11-17

## (undated) RX ORDER — BUPIVACAINE HYDROCHLORIDE 2.5 MG/ML
INJECTION, SOLUTION EPIDURAL; INFILTRATION; INTRACAUDAL
Status: DISPENSED
Start: 2020-11-30

## (undated) RX ORDER — KETOROLAC TROMETHAMINE 30 MG/ML
INJECTION, SOLUTION INTRAMUSCULAR; INTRAVENOUS
Status: DISPENSED
Start: 2019-11-17

## (undated) RX ORDER — CEFTRIAXONE SODIUM 1 G/50ML
INJECTION, SOLUTION INTRAVENOUS
Status: DISPENSED
Start: 2019-03-17

## (undated) RX ORDER — SULFAMETHOXAZOLE/TRIMETHOPRIM 800-160 MG
TABLET ORAL
Status: DISPENSED
Start: 2020-09-10

## (undated) RX ORDER — SODIUM CHLORIDE 9 MG/ML
INJECTION, SOLUTION INTRAVENOUS
Status: DISPENSED
Start: 2019-07-03

## (undated) RX ORDER — MORPHINE SULFATE 4 MG/ML
INJECTION, SOLUTION INTRAMUSCULAR; INTRAVENOUS
Status: DISPENSED
Start: 2019-11-17

## (undated) RX ORDER — PROPOFOL 10 MG/ML
INJECTION, EMULSION INTRAVENOUS
Status: DISPENSED
Start: 2019-11-22

## (undated) RX ORDER — LIDOCAINE HYDROCHLORIDE 20 MG/ML
INJECTION, SOLUTION EPIDURAL; INFILTRATION; INTRACAUDAL; PERINEURAL
Status: DISPENSED
Start: 2019-11-17

## (undated) RX ORDER — NEOSTIGMINE METHYLSULFATE 0.5 MG/ML
INJECTION INTRAVENOUS
Status: DISPENSED
Start: 2020-11-30

## (undated) RX ORDER — LEVOFLOXACIN 5 MG/ML
INJECTION, SOLUTION INTRAVENOUS
Status: DISPENSED
Start: 2019-11-21

## (undated) RX ORDER — DEXAMETHASONE SODIUM PHOSPHATE 4 MG/ML
INJECTION, SOLUTION INTRA-ARTICULAR; INTRALESIONAL; INTRAMUSCULAR; INTRAVENOUS; SOFT TISSUE
Status: DISPENSED
Start: 2019-03-17

## (undated) RX ORDER — KETAMINE HYDROCHLORIDE 50 MG/ML
INJECTION, SOLUTION INTRAMUSCULAR; INTRAVENOUS
Status: DISPENSED
Start: 2019-03-17

## (undated) RX ORDER — SULFAMETHOXAZOLE/TRIMETHOPRIM 800-160 MG
TABLET ORAL
Status: DISPENSED
Start: 2019-11-25

## (undated) RX ORDER — DEXAMETHASONE SODIUM PHOSPHATE 4 MG/ML
INJECTION, SOLUTION INTRA-ARTICULAR; INTRALESIONAL; INTRAMUSCULAR; INTRAVENOUS; SOFT TISSUE
Status: DISPENSED
Start: 2020-11-30

## (undated) RX ORDER — CLINDAMYCIN PHOSPHATE 900 MG/50ML
INJECTION, SOLUTION INTRAVENOUS
Status: DISPENSED
Start: 2020-11-30

## (undated) RX ORDER — FENTANYL CITRATE 50 UG/ML
INJECTION, SOLUTION INTRAMUSCULAR; INTRAVENOUS
Status: DISPENSED
Start: 2019-03-17

## (undated) RX ORDER — SODIUM CHLORIDE, SODIUM LACTATE, POTASSIUM CHLORIDE, CALCIUM CHLORIDE 600; 310; 30; 20 MG/100ML; MG/100ML; MG/100ML; MG/100ML
INJECTION, SOLUTION INTRAVENOUS
Status: DISPENSED
Start: 2020-08-31

## (undated) RX ORDER — FENTANYL CITRATE 50 UG/ML
INJECTION, SOLUTION INTRAMUSCULAR; INTRAVENOUS
Status: DISPENSED
Start: 2020-08-31

## (undated) RX ORDER — CIPROFLOXACIN 2 MG/ML
INJECTION, SOLUTION INTRAVENOUS
Status: DISPENSED
Start: 2019-03-17

## (undated) RX ORDER — SUCRALFATE 1 G/1
TABLET ORAL
Status: DISPENSED
Start: 2019-11-21

## (undated) RX ORDER — CIPROFLOXACIN 2 MG/ML
INJECTION, SOLUTION INTRAVENOUS
Status: DISPENSED
Start: 2019-11-21

## (undated) RX ORDER — KETOROLAC TROMETHAMINE 30 MG/ML
INJECTION, SOLUTION INTRAMUSCULAR; INTRAVENOUS
Status: DISPENSED
Start: 2019-07-03

## (undated) RX ORDER — KETOROLAC TROMETHAMINE 30 MG/ML
INJECTION, SOLUTION INTRAMUSCULAR; INTRAVENOUS
Status: DISPENSED
Start: 2021-05-24

## (undated) RX ORDER — KETOROLAC TROMETHAMINE 30 MG/ML
INJECTION, SOLUTION INTRAMUSCULAR; INTRAVENOUS
Status: DISPENSED
Start: 2020-11-30

## (undated) RX ORDER — LIDOCAINE HYDROCHLORIDE 20 MG/ML
INJECTION, SOLUTION EPIDURAL; INFILTRATION; INTRACAUDAL; PERINEURAL
Status: DISPENSED
Start: 2020-11-30

## (undated) RX ORDER — SODIUM CHLORIDE 9 MG/ML
INJECTION, SOLUTION INTRAVENOUS
Status: DISPENSED
Start: 2019-03-17

## (undated) RX ORDER — TAMSULOSIN HYDROCHLORIDE 0.4 MG/1
CAPSULE ORAL
Status: DISPENSED
Start: 2020-08-31

## (undated) RX ORDER — SCOLOPAMINE TRANSDERMAL SYSTEM 1 MG/1
PATCH, EXTENDED RELEASE TRANSDERMAL
Status: DISPENSED
Start: 2019-11-17

## (undated) RX ORDER — ONDANSETRON 2 MG/ML
INJECTION INTRAMUSCULAR; INTRAVENOUS
Status: DISPENSED
Start: 2019-03-17

## (undated) RX ORDER — LIDOCAINE HYDROCHLORIDE 20 MG/ML
INJECTION, SOLUTION EPIDURAL; INFILTRATION; INTRACAUDAL; PERINEURAL
Status: DISPENSED
Start: 2019-03-17

## (undated) RX ORDER — PROPOFOL 10 MG/ML
INJECTION, EMULSION INTRAVENOUS
Status: DISPENSED
Start: 2020-08-31

## (undated) RX ORDER — ONDANSETRON 2 MG/ML
INJECTION INTRAMUSCULAR; INTRAVENOUS
Status: DISPENSED
Start: 2019-11-17

## (undated) RX ORDER — METOCLOPRAMIDE HYDROCHLORIDE 5 MG/ML
INJECTION INTRAMUSCULAR; INTRAVENOUS
Status: DISPENSED
Start: 2021-06-15

## (undated) RX ORDER — SODIUM CHLORIDE, SODIUM LACTATE, POTASSIUM CHLORIDE, CALCIUM CHLORIDE 600; 310; 30; 20 MG/100ML; MG/100ML; MG/100ML; MG/100ML
INJECTION, SOLUTION INTRAVENOUS
Status: DISPENSED
Start: 2020-11-30

## (undated) RX ORDER — GLYCOPYRROLATE 0.2 MG/ML
INJECTION, SOLUTION INTRAMUSCULAR; INTRAVENOUS
Status: DISPENSED
Start: 2019-11-17

## (undated) RX ORDER — DEXAMETHASONE SODIUM PHOSPHATE 4 MG/ML
INJECTION, SOLUTION INTRA-ARTICULAR; INTRALESIONAL; INTRAMUSCULAR; INTRAVENOUS; SOFT TISSUE
Status: DISPENSED
Start: 2020-08-31

## (undated) RX ORDER — OXYCODONE AND ACETAMINOPHEN 5; 325 MG/1; MG/1
TABLET ORAL
Status: DISPENSED
Start: 2020-11-30

## (undated) RX ORDER — KETAMINE HYDROCHLORIDE 50 MG/ML
INJECTION, SOLUTION INTRAMUSCULAR; INTRAVENOUS
Status: DISPENSED
Start: 2019-11-17